# Patient Record
Sex: MALE | Race: WHITE | NOT HISPANIC OR LATINO | Employment: OTHER
[De-identification: names, ages, dates, MRNs, and addresses within clinical notes are randomized per-mention and may not be internally consistent; named-entity substitution may affect disease eponyms.]

---

## 2017-02-09 ENCOUNTER — RECORDS - HEALTHEAST (OUTPATIENT)
Dept: ADMINISTRATIVE | Facility: OTHER | Age: 67
End: 2017-02-09

## 2017-03-08 ENCOUNTER — RECORDS - HEALTHEAST (OUTPATIENT)
Dept: ADMINISTRATIVE | Facility: OTHER | Age: 67
End: 2017-03-08

## 2017-03-09 ENCOUNTER — RECORDS - HEALTHEAST (OUTPATIENT)
Dept: ADMINISTRATIVE | Facility: OTHER | Age: 67
End: 2017-03-09

## 2017-07-18 ENCOUNTER — RECORDS - HEALTHEAST (OUTPATIENT)
Dept: ADMINISTRATIVE | Facility: OTHER | Age: 67
End: 2017-07-18

## 2017-10-06 ENCOUNTER — RECORDS - HEALTHEAST (OUTPATIENT)
Dept: ADMINISTRATIVE | Facility: OTHER | Age: 67
End: 2017-10-06

## 2017-11-29 ENCOUNTER — RECORDS - HEALTHEAST (OUTPATIENT)
Dept: ADMINISTRATIVE | Facility: OTHER | Age: 67
End: 2017-11-29

## 2018-04-06 ENCOUNTER — RECORDS - HEALTHEAST (OUTPATIENT)
Dept: ADMINISTRATIVE | Facility: OTHER | Age: 68
End: 2018-04-06

## 2018-05-03 ENCOUNTER — RECORDS - HEALTHEAST (OUTPATIENT)
Dept: ADMINISTRATIVE | Facility: OTHER | Age: 68
End: 2018-05-03

## 2018-07-17 ENCOUNTER — RECORDS - HEALTHEAST (OUTPATIENT)
Dept: ADMINISTRATIVE | Facility: OTHER | Age: 68
End: 2018-07-17

## 2018-12-04 ENCOUNTER — COMMUNICATION - HEALTHEAST (OUTPATIENT)
Dept: FAMILY MEDICINE | Facility: CLINIC | Age: 68
End: 2018-12-04

## 2018-12-04 ENCOUNTER — OFFICE VISIT - HEALTHEAST (OUTPATIENT)
Dept: FAMILY MEDICINE | Facility: CLINIC | Age: 68
End: 2018-12-04

## 2018-12-04 DIAGNOSIS — M1A.00X0 IDIOPATHIC CHRONIC GOUT WITHOUT TOPHUS, UNSPECIFIED SITE: ICD-10-CM

## 2018-12-04 DIAGNOSIS — E66.01 MORBID OBESITY (H): ICD-10-CM

## 2018-12-04 DIAGNOSIS — I47.10 SVT (SUPRAVENTRICULAR TACHYCARDIA) (H): ICD-10-CM

## 2018-12-04 DIAGNOSIS — E78.2 ELEVATED TRIGLYCERIDES WITH HIGH CHOLESTEROL: ICD-10-CM

## 2018-12-04 DIAGNOSIS — F33.42 RECURRENT MAJOR DEPRESSIVE DISORDER, IN FULL REMISSION (H): ICD-10-CM

## 2018-12-04 DIAGNOSIS — Z76.89 ENCOUNTER TO ESTABLISH CARE WITH NEW DOCTOR: ICD-10-CM

## 2018-12-04 DIAGNOSIS — I10 BENIGN ESSENTIAL HYPERTENSION: ICD-10-CM

## 2018-12-04 ASSESSMENT — MIFFLIN-ST. JEOR: SCORE: 1923.11

## 2019-03-02 ENCOUNTER — COMMUNICATION - HEALTHEAST (OUTPATIENT)
Dept: FAMILY MEDICINE | Facility: CLINIC | Age: 69
End: 2019-03-02

## 2019-03-07 ENCOUNTER — OFFICE VISIT - HEALTHEAST (OUTPATIENT)
Dept: FAMILY MEDICINE | Facility: CLINIC | Age: 69
End: 2019-03-07

## 2019-03-07 DIAGNOSIS — Z00.00 ROUTINE GENERAL MEDICAL EXAMINATION AT A HEALTH CARE FACILITY: ICD-10-CM

## 2019-03-07 DIAGNOSIS — E78.2 ELEVATED TRIGLYCERIDES WITH HIGH CHOLESTEROL: ICD-10-CM

## 2019-03-07 DIAGNOSIS — E66.01 MORBID OBESITY (H): ICD-10-CM

## 2019-03-07 DIAGNOSIS — Z78.9 ALCOHOL USE: ICD-10-CM

## 2019-03-07 DIAGNOSIS — Z87.39 HX OF GOUT: ICD-10-CM

## 2019-03-07 DIAGNOSIS — I10 BENIGN ESSENTIAL HYPERTENSION: ICD-10-CM

## 2019-03-07 LAB
ANION GAP SERPL CALCULATED.3IONS-SCNC: 12 MMOL/L (ref 5–18)
BUN SERPL-MCNC: 8 MG/DL (ref 8–22)
CALCIUM SERPL-MCNC: 9 MG/DL (ref 8.5–10.5)
CHLORIDE BLD-SCNC: 105 MMOL/L (ref 98–107)
CHOLEST SERPL-MCNC: 152 MG/DL
CO2 SERPL-SCNC: 26 MMOL/L (ref 22–31)
CREAT SERPL-MCNC: 0.81 MG/DL (ref 0.7–1.3)
FASTING STATUS PATIENT QL REPORTED: YES
GFR SERPL CREATININE-BSD FRML MDRD: >60 ML/MIN/1.73M2
GLUCOSE BLD-MCNC: 139 MG/DL (ref 70–125)
HDLC SERPL-MCNC: 55 MG/DL
LDLC SERPL CALC-MCNC: 64 MG/DL
POTASSIUM BLD-SCNC: 4.4 MMOL/L (ref 3.5–5)
SODIUM SERPL-SCNC: 143 MMOL/L (ref 136–145)
TRIGL SERPL-MCNC: 166 MG/DL
URATE SERPL-MCNC: 5.9 MG/DL (ref 3–8)

## 2019-03-07 ASSESSMENT — MIFFLIN-ST. JEOR: SCORE: 1925.66

## 2019-03-08 ENCOUNTER — COMMUNICATION - HEALTHEAST (OUTPATIENT)
Dept: FAMILY MEDICINE | Facility: CLINIC | Age: 69
End: 2019-03-08

## 2019-05-29 ENCOUNTER — COMMUNICATION - HEALTHEAST (OUTPATIENT)
Dept: FAMILY MEDICINE | Facility: CLINIC | Age: 69
End: 2019-05-29

## 2019-05-29 DIAGNOSIS — F33.42 RECURRENT MAJOR DEPRESSIVE DISORDER, IN FULL REMISSION (H): ICD-10-CM

## 2019-09-03 ENCOUNTER — COMMUNICATION - HEALTHEAST (OUTPATIENT)
Dept: FAMILY MEDICINE | Facility: CLINIC | Age: 69
End: 2019-09-03

## 2019-09-03 DIAGNOSIS — F33.42 RECURRENT MAJOR DEPRESSIVE DISORDER, IN FULL REMISSION (H): ICD-10-CM

## 2019-09-10 ENCOUNTER — OFFICE VISIT - HEALTHEAST (OUTPATIENT)
Dept: FAMILY MEDICINE | Facility: CLINIC | Age: 69
End: 2019-09-10

## 2019-09-10 ENCOUNTER — RECORDS - HEALTHEAST (OUTPATIENT)
Dept: GENERAL RADIOLOGY | Facility: CLINIC | Age: 69
End: 2019-09-10

## 2019-09-10 DIAGNOSIS — Z23 NEEDS FLU SHOT: ICD-10-CM

## 2019-09-10 DIAGNOSIS — M25.561 PAIN IN RIGHT KNEE: ICD-10-CM

## 2019-09-10 DIAGNOSIS — M17.0 PRIMARY OSTEOARTHRITIS OF BOTH KNEES: ICD-10-CM

## 2019-09-10 DIAGNOSIS — M17.0 BILATERAL PRIMARY OSTEOARTHRITIS OF KNEE: ICD-10-CM

## 2019-09-10 DIAGNOSIS — M25.562 CHRONIC PAIN OF BOTH KNEES: ICD-10-CM

## 2019-09-10 DIAGNOSIS — R74.01 ELEVATED TRANSAMINASE LEVEL: ICD-10-CM

## 2019-09-10 DIAGNOSIS — M25.562 PAIN IN LEFT KNEE: ICD-10-CM

## 2019-09-10 DIAGNOSIS — G89.29 CHRONIC PAIN OF BOTH KNEES: ICD-10-CM

## 2019-09-10 DIAGNOSIS — M25.561 CHRONIC PAIN OF BOTH KNEES: ICD-10-CM

## 2019-09-10 DIAGNOSIS — G89.29 OTHER CHRONIC PAIN: ICD-10-CM

## 2019-09-10 DIAGNOSIS — L82.1 SEBORRHEIC KERATOSIS: ICD-10-CM

## 2019-09-10 DIAGNOSIS — R93.2 ABNORMAL ULTRASOUND OF LIVER: ICD-10-CM

## 2019-09-10 LAB
ALBUMIN SERPL-MCNC: 3.9 G/DL (ref 3.5–5)
ALP SERPL-CCNC: 106 U/L (ref 45–120)
ALT SERPL W P-5'-P-CCNC: 90 U/L (ref 0–45)
AST SERPL W P-5'-P-CCNC: 82 U/L (ref 0–40)
BILIRUB DIRECT SERPL-MCNC: 0.2 MG/DL
BILIRUB SERPL-MCNC: 0.3 MG/DL (ref 0–1)
PROT SERPL-MCNC: 7 G/DL (ref 6–8)

## 2019-09-10 ASSESSMENT — ANXIETY QUESTIONNAIRES
5. BEING SO RESTLESS THAT IT IS HARD TO SIT STILL: NOT AT ALL
2. NOT BEING ABLE TO STOP OR CONTROL WORRYING: NOT AT ALL
6. BECOMING EASILY ANNOYED OR IRRITABLE: NOT AT ALL
GAD7 TOTAL SCORE: 0
IF YOU CHECKED OFF ANY PROBLEMS ON THIS QUESTIONNAIRE, HOW DIFFICULT HAVE THESE PROBLEMS MADE IT FOR YOU TO DO YOUR WORK, TAKE CARE OF THINGS AT HOME, OR GET ALONG WITH OTHER PEOPLE: NOT DIFFICULT AT ALL
7. FEELING AFRAID AS IF SOMETHING AWFUL MIGHT HAPPEN: NOT AT ALL
3. WORRYING TOO MUCH ABOUT DIFFERENT THINGS: NOT AT ALL
4. TROUBLE RELAXING: NOT AT ALL
1. FEELING NERVOUS, ANXIOUS, OR ON EDGE: NOT AT ALL

## 2019-09-10 ASSESSMENT — PATIENT HEALTH QUESTIONNAIRE - PHQ9: SUM OF ALL RESPONSES TO PHQ QUESTIONS 1-9: 1

## 2019-09-11 ENCOUNTER — COMMUNICATION - HEALTHEAST (OUTPATIENT)
Dept: FAMILY MEDICINE | Facility: CLINIC | Age: 69
End: 2019-09-11

## 2019-09-17 ENCOUNTER — HOSPITAL ENCOUNTER (OUTPATIENT)
Dept: ULTRASOUND IMAGING | Facility: CLINIC | Age: 69
Discharge: HOME | End: 2019-09-17
Attending: FAMILY MEDICINE

## 2019-09-17 ENCOUNTER — COMMUNICATION - HEALTHEAST (OUTPATIENT)
Dept: FAMILY MEDICINE | Facility: CLINIC | Age: 69
End: 2019-09-17

## 2019-09-17 DIAGNOSIS — R93.2 ABNORMAL ULTRASOUND OF LIVER: ICD-10-CM

## 2019-09-17 DIAGNOSIS — R74.01 ELEVATED TRANSAMINASE LEVEL: ICD-10-CM

## 2019-09-17 DIAGNOSIS — R74.02 ELEVATION OF LEVEL OF TRANSAMINASE AND LACTIC ACID DEHYDROGENASE (LDH): ICD-10-CM

## 2019-09-17 DIAGNOSIS — R74.01 ELEVATION OF LEVEL OF TRANSAMINASE AND LACTIC ACID DEHYDROGENASE (LDH): ICD-10-CM

## 2019-11-27 ENCOUNTER — COMMUNICATION - HEALTHEAST (OUTPATIENT)
Dept: FAMILY MEDICINE | Facility: CLINIC | Age: 69
End: 2019-11-27

## 2019-11-27 DIAGNOSIS — E78.2 ELEVATED TRIGLYCERIDES WITH HIGH CHOLESTEROL: ICD-10-CM

## 2019-11-27 DIAGNOSIS — I10 BENIGN ESSENTIAL HYPERTENSION: ICD-10-CM

## 2019-11-27 DIAGNOSIS — I47.10 SVT (SUPRAVENTRICULAR TACHYCARDIA) (H): ICD-10-CM

## 2019-11-27 DIAGNOSIS — M1A.00X0 IDIOPATHIC CHRONIC GOUT WITHOUT TOPHUS, UNSPECIFIED SITE: ICD-10-CM

## 2019-11-27 DIAGNOSIS — F33.42 RECURRENT MAJOR DEPRESSIVE DISORDER, IN FULL REMISSION (H): ICD-10-CM

## 2019-12-01 ENCOUNTER — COMMUNICATION - HEALTHEAST (OUTPATIENT)
Dept: FAMILY MEDICINE | Facility: CLINIC | Age: 69
End: 2019-12-01

## 2019-12-01 DIAGNOSIS — F33.42 RECURRENT MAJOR DEPRESSIVE DISORDER, IN FULL REMISSION (H): ICD-10-CM

## 2020-02-24 ENCOUNTER — COMMUNICATION - HEALTHEAST (OUTPATIENT)
Dept: FAMILY MEDICINE | Facility: CLINIC | Age: 70
End: 2020-02-24

## 2020-02-24 DIAGNOSIS — F33.42 RECURRENT MAJOR DEPRESSIVE DISORDER, IN FULL REMISSION (H): ICD-10-CM

## 2020-02-25 ENCOUNTER — COMMUNICATION - HEALTHEAST (OUTPATIENT)
Dept: FAMILY MEDICINE | Facility: CLINIC | Age: 70
End: 2020-02-25

## 2020-03-11 ENCOUNTER — COMMUNICATION - HEALTHEAST (OUTPATIENT)
Dept: SCHEDULING | Facility: CLINIC | Age: 70
End: 2020-03-11

## 2020-03-12 ENCOUNTER — OFFICE VISIT - HEALTHEAST (OUTPATIENT)
Dept: FAMILY MEDICINE | Facility: CLINIC | Age: 70
End: 2020-03-12

## 2020-03-12 DIAGNOSIS — J43.8 OTHER EMPHYSEMA (H): ICD-10-CM

## 2020-03-12 DIAGNOSIS — E66.01 MORBID OBESITY (H): ICD-10-CM

## 2020-03-12 DIAGNOSIS — E78.2 ELEVATED TRIGLYCERIDES WITH HIGH CHOLESTEROL: ICD-10-CM

## 2020-03-12 DIAGNOSIS — R74.01 ELEVATED TRANSAMINASE LEVEL: ICD-10-CM

## 2020-03-12 DIAGNOSIS — Z12.5 SCREENING FOR PROSTATE CANCER: ICD-10-CM

## 2020-03-12 DIAGNOSIS — Z23 ENCOUNTER FOR ADMINISTRATION OF VACCINE: ICD-10-CM

## 2020-03-12 DIAGNOSIS — K76.0 HEPATIC STEATOSIS: ICD-10-CM

## 2020-03-12 DIAGNOSIS — F33.42 RECURRENT MAJOR DEPRESSIVE DISORDER, IN FULL REMISSION (H): ICD-10-CM

## 2020-03-12 DIAGNOSIS — I10 BENIGN ESSENTIAL HYPERTENSION: ICD-10-CM

## 2020-03-12 DIAGNOSIS — J06.9 URI WITH COUGH AND CONGESTION: ICD-10-CM

## 2020-03-12 DIAGNOSIS — R01.1 HEART MURMUR: ICD-10-CM

## 2020-03-12 DIAGNOSIS — R73.03 PREDIABETES: ICD-10-CM

## 2020-03-12 DIAGNOSIS — Z00.00 ROUTINE GENERAL MEDICAL EXAMINATION AT A HEALTH CARE FACILITY: ICD-10-CM

## 2020-03-12 DIAGNOSIS — I47.10 SVT (SUPRAVENTRICULAR TACHYCARDIA) (H): ICD-10-CM

## 2020-03-12 LAB
ALBUMIN SERPL-MCNC: 3.8 G/DL (ref 3.5–5)
ALP SERPL-CCNC: 130 U/L (ref 45–120)
ALT SERPL W P-5'-P-CCNC: 70 U/L (ref 0–45)
ANION GAP SERPL CALCULATED.3IONS-SCNC: 10 MMOL/L (ref 5–18)
AST SERPL W P-5'-P-CCNC: 77 U/L (ref 0–40)
BILIRUB SERPL-MCNC: 0.4 MG/DL (ref 0–1)
BUN SERPL-MCNC: 12 MG/DL (ref 8–22)
CALCIUM SERPL-MCNC: 9 MG/DL (ref 8.5–10.5)
CHLORIDE BLD-SCNC: 104 MMOL/L (ref 98–107)
CHOLEST SERPL-MCNC: 156 MG/DL
CO2 SERPL-SCNC: 25 MMOL/L (ref 22–31)
CREAT SERPL-MCNC: 0.76 MG/DL (ref 0.7–1.3)
FASTING STATUS PATIENT QL REPORTED: YES
GFR SERPL CREATININE-BSD FRML MDRD: >60 ML/MIN/1.73M2
GLUCOSE BLD-MCNC: 151 MG/DL (ref 70–125)
HBA1C MFR BLD: 6.5 %
HDLC SERPL-MCNC: 57 MG/DL
LDLC SERPL CALC-MCNC: 77 MG/DL
POTASSIUM BLD-SCNC: 4.4 MMOL/L (ref 3.5–5)
PROT SERPL-MCNC: 7.1 G/DL (ref 6–8)
PSA SERPL-MCNC: 0.6 NG/ML (ref 0–4.5)
SODIUM SERPL-SCNC: 139 MMOL/L (ref 136–145)
TRIGL SERPL-MCNC: 111 MG/DL

## 2020-03-12 ASSESSMENT — ANXIETY QUESTIONNAIRES
7. FEELING AFRAID AS IF SOMETHING AWFUL MIGHT HAPPEN: NOT AT ALL
4. TROUBLE RELAXING: NOT AT ALL
2. NOT BEING ABLE TO STOP OR CONTROL WORRYING: NOT AT ALL
6. BECOMING EASILY ANNOYED OR IRRITABLE: NOT AT ALL
1. FEELING NERVOUS, ANXIOUS, OR ON EDGE: NOT AT ALL
GAD7 TOTAL SCORE: 0
5. BEING SO RESTLESS THAT IT IS HARD TO SIT STILL: NOT AT ALL
3. WORRYING TOO MUCH ABOUT DIFFERENT THINGS: NOT AT ALL

## 2020-03-12 ASSESSMENT — MIFFLIN-ST. JEOR: SCORE: 1959.96

## 2020-03-12 ASSESSMENT — PATIENT HEALTH QUESTIONNAIRE - PHQ9: SUM OF ALL RESPONSES TO PHQ QUESTIONS 1-9: 5

## 2020-03-26 ENCOUNTER — COMMUNICATION - HEALTHEAST (OUTPATIENT)
Dept: FAMILY MEDICINE | Facility: CLINIC | Age: 70
End: 2020-03-26

## 2020-06-02 ENCOUNTER — COMMUNICATION - HEALTHEAST (OUTPATIENT)
Dept: FAMILY MEDICINE | Facility: CLINIC | Age: 70
End: 2020-06-02

## 2020-06-02 DIAGNOSIS — F33.42 RECURRENT MAJOR DEPRESSIVE DISORDER, IN FULL REMISSION (H): ICD-10-CM

## 2020-07-07 ENCOUNTER — OFFICE VISIT - HEALTHEAST (OUTPATIENT)
Dept: FAMILY MEDICINE | Facility: CLINIC | Age: 70
End: 2020-07-07

## 2020-07-07 DIAGNOSIS — R01.1 HEART MURMUR: ICD-10-CM

## 2020-07-07 DIAGNOSIS — R74.01 ELEVATED TRANSAMINASE LEVEL: ICD-10-CM

## 2020-07-07 DIAGNOSIS — K76.0 HEPATIC STEATOSIS: ICD-10-CM

## 2020-07-07 DIAGNOSIS — E11.9 TYPE 2 DIABETES MELLITUS WITHOUT COMPLICATION, WITHOUT LONG-TERM CURRENT USE OF INSULIN (H): ICD-10-CM

## 2020-07-07 LAB
ALBUMIN SERPL-MCNC: 3.6 G/DL (ref 3.5–5)
ALP SERPL-CCNC: 140 U/L (ref 45–120)
ALT SERPL W P-5'-P-CCNC: 79 U/L (ref 0–45)
AST SERPL W P-5'-P-CCNC: 96 U/L (ref 0–40)
BILIRUB DIRECT SERPL-MCNC: 0.2 MG/DL
BILIRUB SERPL-MCNC: 0.4 MG/DL (ref 0–1)
HBA1C MFR BLD: 6.3 % (ref 3.5–6)
PROT SERPL-MCNC: 7.3 G/DL (ref 6–8)

## 2020-07-07 ASSESSMENT — ANXIETY QUESTIONNAIRES
3. WORRYING TOO MUCH ABOUT DIFFERENT THINGS: NOT AT ALL
1. FEELING NERVOUS, ANXIOUS, OR ON EDGE: NOT AT ALL
GAD7 TOTAL SCORE: 1
4. TROUBLE RELAXING: NOT AT ALL
2. NOT BEING ABLE TO STOP OR CONTROL WORRYING: NOT AT ALL
IF YOU CHECKED OFF ANY PROBLEMS ON THIS QUESTIONNAIRE, HOW DIFFICULT HAVE THESE PROBLEMS MADE IT FOR YOU TO DO YOUR WORK, TAKE CARE OF THINGS AT HOME, OR GET ALONG WITH OTHER PEOPLE: NOT DIFFICULT AT ALL
6. BECOMING EASILY ANNOYED OR IRRITABLE: SEVERAL DAYS
5. BEING SO RESTLESS THAT IT IS HARD TO SIT STILL: NOT AT ALL
7. FEELING AFRAID AS IF SOMETHING AWFUL MIGHT HAPPEN: NOT AT ALL

## 2020-07-07 ASSESSMENT — PATIENT HEALTH QUESTIONNAIRE - PHQ9: SUM OF ALL RESPONSES TO PHQ QUESTIONS 1-9: 4

## 2020-08-31 ENCOUNTER — RECORDS - HEALTHEAST (OUTPATIENT)
Dept: ADMINISTRATIVE | Facility: OTHER | Age: 70
End: 2020-08-31

## 2020-12-14 ENCOUNTER — COMMUNICATION - HEALTHEAST (OUTPATIENT)
Dept: FAMILY MEDICINE | Facility: CLINIC | Age: 70
End: 2020-12-14

## 2020-12-14 DIAGNOSIS — M1A.00X0 IDIOPATHIC CHRONIC GOUT WITHOUT TOPHUS, UNSPECIFIED SITE: ICD-10-CM

## 2020-12-14 DIAGNOSIS — F33.42 RECURRENT MAJOR DEPRESSIVE DISORDER, IN FULL REMISSION (H): ICD-10-CM

## 2020-12-14 DIAGNOSIS — I47.10 SVT (SUPRAVENTRICULAR TACHYCARDIA) (H): ICD-10-CM

## 2020-12-14 DIAGNOSIS — E78.2 ELEVATED TRIGLYCERIDES WITH HIGH CHOLESTEROL: ICD-10-CM

## 2020-12-14 DIAGNOSIS — I10 BENIGN ESSENTIAL HYPERTENSION: ICD-10-CM

## 2020-12-22 ENCOUNTER — OFFICE VISIT - HEALTHEAST (OUTPATIENT)
Dept: FAMILY MEDICINE | Facility: CLINIC | Age: 70
End: 2020-12-22

## 2020-12-22 DIAGNOSIS — R74.01 ELEVATED TRANSAMINASE LEVEL: ICD-10-CM

## 2020-12-22 DIAGNOSIS — F33.42 RECURRENT MAJOR DEPRESSIVE DISORDER, IN FULL REMISSION (H): ICD-10-CM

## 2020-12-22 DIAGNOSIS — I10 BENIGN ESSENTIAL HYPERTENSION: ICD-10-CM

## 2020-12-22 DIAGNOSIS — L98.9 SKIN LESION: ICD-10-CM

## 2020-12-22 DIAGNOSIS — E11.9 TYPE 2 DIABETES MELLITUS WITHOUT COMPLICATION, WITHOUT LONG-TERM CURRENT USE OF INSULIN (H): ICD-10-CM

## 2020-12-22 DIAGNOSIS — K76.0 HEPATIC STEATOSIS: ICD-10-CM

## 2020-12-22 DIAGNOSIS — Z87.39 HX OF GOUT: ICD-10-CM

## 2020-12-22 LAB
ALBUMIN SERPL-MCNC: 3.9 G/DL (ref 3.5–5)
ALP SERPL-CCNC: 136 U/L (ref 45–120)
ALT SERPL W P-5'-P-CCNC: 68 U/L (ref 0–45)
ANION GAP SERPL CALCULATED.3IONS-SCNC: 10 MMOL/L (ref 5–18)
AST SERPL W P-5'-P-CCNC: 76 U/L (ref 0–40)
BILIRUB SERPL-MCNC: 0.4 MG/DL (ref 0–1)
BUN SERPL-MCNC: 12 MG/DL (ref 8–22)
CALCIUM SERPL-MCNC: 8.9 MG/DL (ref 8.5–10.5)
CHLORIDE BLD-SCNC: 105 MMOL/L (ref 98–107)
CO2 SERPL-SCNC: 27 MMOL/L (ref 22–31)
CREAT SERPL-MCNC: 0.74 MG/DL (ref 0.7–1.3)
GFR SERPL CREATININE-BSD FRML MDRD: >60 ML/MIN/1.73M2
GLUCOSE BLD-MCNC: 155 MG/DL (ref 70–125)
HBA1C MFR BLD: 6.1 %
POTASSIUM BLD-SCNC: 4.3 MMOL/L (ref 3.5–5)
PROT SERPL-MCNC: 7 G/DL (ref 6–8)
SODIUM SERPL-SCNC: 142 MMOL/L (ref 136–145)
URATE SERPL-MCNC: 6.3 MG/DL (ref 3–8)

## 2021-01-22 ENCOUNTER — RECORDS - HEALTHEAST (OUTPATIENT)
Dept: ADMINISTRATIVE | Facility: OTHER | Age: 71
End: 2021-01-22

## 2021-02-02 ENCOUNTER — RECORDS - HEALTHEAST (OUTPATIENT)
Dept: ADMINISTRATIVE | Facility: OTHER | Age: 71
End: 2021-02-02

## 2021-02-03 ENCOUNTER — COMMUNICATION - HEALTHEAST (OUTPATIENT)
Dept: FAMILY MEDICINE | Facility: CLINIC | Age: 71
End: 2021-02-03

## 2021-02-03 DIAGNOSIS — E78.2 ELEVATED TRIGLYCERIDES WITH HIGH CHOLESTEROL: ICD-10-CM

## 2021-02-09 ENCOUNTER — COMMUNICATION - HEALTHEAST (OUTPATIENT)
Dept: FAMILY MEDICINE | Facility: CLINIC | Age: 71
End: 2021-02-09

## 2021-02-09 DIAGNOSIS — E78.2 ELEVATED TRIGLYCERIDES WITH HIGH CHOLESTEROL: ICD-10-CM

## 2021-02-16 ENCOUNTER — COMMUNICATION - HEALTHEAST (OUTPATIENT)
Dept: FAMILY MEDICINE | Facility: CLINIC | Age: 71
End: 2021-02-16

## 2021-02-16 DIAGNOSIS — E78.2 ELEVATED TRIGLYCERIDES WITH HIGH CHOLESTEROL: ICD-10-CM

## 2021-02-16 RX ORDER — FENOFIBRATE 48 MG/1
48 TABLET, COATED ORAL DAILY
Qty: 90 TABLET | Refills: 3 | Status: SHIPPED | OUTPATIENT
Start: 2021-02-16 | End: 2022-02-11

## 2021-03-08 ENCOUNTER — COMMUNICATION - HEALTHEAST (OUTPATIENT)
Dept: FAMILY MEDICINE | Facility: CLINIC | Age: 71
End: 2021-03-08

## 2021-03-10 ENCOUNTER — COMMUNICATION - HEALTHEAST (OUTPATIENT)
Dept: FAMILY MEDICINE | Facility: CLINIC | Age: 71
End: 2021-03-10

## 2021-03-10 DIAGNOSIS — F33.42 RECURRENT MAJOR DEPRESSIVE DISORDER, IN FULL REMISSION (H): ICD-10-CM

## 2021-03-15 ENCOUNTER — OFFICE VISIT - HEALTHEAST (OUTPATIENT)
Dept: FAMILY MEDICINE | Facility: CLINIC | Age: 71
End: 2021-03-15

## 2021-03-15 DIAGNOSIS — F33.42 RECURRENT MAJOR DEPRESSIVE DISORDER, IN FULL REMISSION (H): ICD-10-CM

## 2021-03-15 RX ORDER — OLANZAPINE 5 MG/1
5 TABLET ORAL DAILY
Qty: 90 TABLET | Refills: 3 | Status: SHIPPED | OUTPATIENT
Start: 2021-03-15 | End: 2022-03-07

## 2021-05-19 ENCOUNTER — OFFICE VISIT - HEALTHEAST (OUTPATIENT)
Dept: FAMILY MEDICINE | Facility: CLINIC | Age: 71
End: 2021-05-19

## 2021-05-19 DIAGNOSIS — F33.42 RECURRENT MAJOR DEPRESSIVE DISORDER, IN FULL REMISSION (H): ICD-10-CM

## 2021-05-19 DIAGNOSIS — E78.2 ELEVATED TRIGLYCERIDES WITH HIGH CHOLESTEROL: ICD-10-CM

## 2021-05-19 DIAGNOSIS — E11.9 TYPE 2 DIABETES MELLITUS WITHOUT COMPLICATION, WITHOUT LONG-TERM CURRENT USE OF INSULIN (H): ICD-10-CM

## 2021-05-19 DIAGNOSIS — I10 BENIGN ESSENTIAL HYPERTENSION: ICD-10-CM

## 2021-05-19 LAB
CHOLEST SERPL-MCNC: 178 MG/DL
CREAT UR-MCNC: 172.5 MG/DL
FASTING STATUS PATIENT QL REPORTED: YES
HBA1C MFR BLD: 6.1 %
HDLC SERPL-MCNC: 57 MG/DL
LDLC SERPL CALC-MCNC: 82 MG/DL
MICROALBUMIN UR-MCNC: 0.81 MG/DL (ref 0–1.99)
MICROALBUMIN/CREAT UR: 4.7 MG/G
TRIGL SERPL-MCNC: 194 MG/DL

## 2021-05-19 RX ORDER — LOSARTAN POTASSIUM 50 MG/1
50 TABLET ORAL DAILY
Qty: 30 TABLET | Refills: 3 | Status: SHIPPED | OUTPATIENT
Start: 2021-05-19 | End: 2021-09-30

## 2021-05-19 ASSESSMENT — ANXIETY QUESTIONNAIRES
7. FEELING AFRAID AS IF SOMETHING AWFUL MIGHT HAPPEN: NOT AT ALL
6. BECOMING EASILY ANNOYED OR IRRITABLE: SEVERAL DAYS
5. BEING SO RESTLESS THAT IT IS HARD TO SIT STILL: NOT AT ALL
1. FEELING NERVOUS, ANXIOUS, OR ON EDGE: SEVERAL DAYS
2. NOT BEING ABLE TO STOP OR CONTROL WORRYING: NOT AT ALL
4. TROUBLE RELAXING: NOT AT ALL
GAD7 TOTAL SCORE: 2
IF YOU CHECKED OFF ANY PROBLEMS ON THIS QUESTIONNAIRE, HOW DIFFICULT HAVE THESE PROBLEMS MADE IT FOR YOU TO DO YOUR WORK, TAKE CARE OF THINGS AT HOME, OR GET ALONG WITH OTHER PEOPLE: SOMEWHAT DIFFICULT
3. WORRYING TOO MUCH ABOUT DIFFERENT THINGS: NOT AT ALL

## 2021-05-19 ASSESSMENT — PATIENT HEALTH QUESTIONNAIRE - PHQ9: SUM OF ALL RESPONSES TO PHQ QUESTIONS 1-9: 4

## 2021-05-26 ASSESSMENT — PATIENT HEALTH QUESTIONNAIRE - PHQ9
SUM OF ALL RESPONSES TO PHQ QUESTIONS 1-9: 5
SUM OF ALL RESPONSES TO PHQ QUESTIONS 1-9: 1

## 2021-05-27 VITALS — SYSTOLIC BLOOD PRESSURE: 130 MMHG | DIASTOLIC BLOOD PRESSURE: 70 MMHG | HEART RATE: 88 BPM | WEIGHT: 258 LBS

## 2021-05-27 ASSESSMENT — PATIENT HEALTH QUESTIONNAIRE - PHQ9
SUM OF ALL RESPONSES TO PHQ QUESTIONS 1-9: 4
SUM OF ALL RESPONSES TO PHQ QUESTIONS 1-9: 4

## 2021-05-28 ASSESSMENT — ANXIETY QUESTIONNAIRES
GAD7 TOTAL SCORE: 2
GAD7 TOTAL SCORE: 0
GAD7 TOTAL SCORE: 0
GAD7 TOTAL SCORE: 1

## 2021-05-29 NOTE — TELEPHONE ENCOUNTER
RN cannot approve Refill Request    RN can NOT refill this medication med is not covered by policy/route to provider.    Rashi Johnson, [facility] Triage/Med Refill 5/29/2019    Requested Prescriptions   Pending Prescriptions Disp Refills     OLANZapine (ZYPREXA) 5 MG tablet [Pharmacy Med Name: OLANZAPINE 5 MG TABLET] 90 tablet 0     Sig: TAKE 1 TABLET BY MOUTH EVERY DAY       There is no refill protocol information for this order

## 2021-05-31 NOTE — TELEPHONE ENCOUNTER
RN cannot approve Refill Request    RN can NOT refill this medication med is not covered by policy/route to provider. Last office visit: 12/4/2018 Shira Contreras MD Last Physical: 3/7/2019 Last MTM visit: Visit date not found Last visit same specialty: 12/4/2018 Shira Contreras MD.  Next visit within 3 mo: Visit date not found  Next physical within 3 mo: Visit date not found      Britney Hatch, [facility] Triage/Med Refill 9/3/2019    Requested Prescriptions   Pending Prescriptions Disp Refills     OLANZapine (ZYPREXA) 5 MG tablet [Pharmacy Med Name: OLANZAPINE 5 MG TABLET] 90 tablet 0     Sig: TAKE 1 TABLET BY MOUTH EVERY DAY       There is no refill protocol information for this order

## 2021-06-01 NOTE — PROGRESS NOTES
Assessment/Plan:    1. Abnormal ultrasound of liver  2. Elevated transaminase level  Per review of records, pt with hx abnormal liver appearance on US as well as worsening transaminase levels over time - recommend re-evaluating at this time with LFTs and US. Certainly pt is obese so could be fatty liver. Will consider referral to GI pending results.  - US Abdomen Limited; Future  - Hepatic Profile    3. Chronic pain of both knees  4. Primary osteoarthritis of both knees  Pt reporting worsening bilateral knee pain and issues with function. Per review of records, pt had XR done last year that showed mild OA - suspect progression of OA, will check with XR today. Provided printed exercises to be done and home and information printed on OA. Discussed use of ice/heat, tylenol/ibuprofen and could consider steroid injection in the future.  - XR Knees Bilateral 1 Or 2 VWS; Future    5. Seborrheic keratosis  Back lesion c/w seb keratosis recommend routine monitoring and certainly can remove in future if desired or lesion is changing    6. Needs flu shot  Due for flu shot, administered today   - Influenza High Dose, Seasonal 65+ yrs      Follow up: pending test results    Shira Contreras MD  [facility]    Subjective:    Patient ID: Christopher Lindsey is a 68 y.o. male is here today for multiple concerns    Hx abnormal US and liver function tests  -pt reports hx abnormal liver and gallbladder on US as well as elevated liver tests - states he was supposed to have follow up on this  -per review of past records - pt had abd US on 5/3/18 which showed hepatocellular disease vs fatty infiltration of liver (states similar to US in 2011) as well as persistent 3 small masses/lesions in gallbladder which are likely polyps  -prior labs show: AST 60 and ALT 74 on 4/6/18; ALT 53 and AST 38 on 10/6/17  -pt denies RUQ/abd pain, no nausea/vomiting or issues with his stools    Mole on back  -upper back  -last was 4mm, had been  becoming more asymmetric over time  -no pain or bleeding  -hasn't noticed specific changes  -prior doctor had recommended monitoring with pictures but pt states he hasn't done this  -wondering if it needs to be removed    Knee pain  -issue for past 1.5 years  -initially thought was patellofemoral syndrome  -pain with up/down stairs and if walks too long or too far  -pain in front of knee cap  -takes 1 alleve/day for this  -per review of prior records pt had bilateral knee XR done on 5/3/18 which showed medial joint space narrowing of both knees as well as mild spurring      Patient Active Problem List   Diagnosis     Elevated triglycerides with high cholesterol     Benign essential hypertension     Other emphysema (H)     Prediabetes     Recurrent major depressive disorder, in full remission (H)     SVT (supraventricular tachycardia) (H)     TERESA (obstructive sleep apnea)     Bilateral primary osteoarthritis of knee     Obesity (BMI 35.0-39.9) with comorbidity (H)     Obesity hypoventilation syndrome (H)     Restrictive lung disease     Hearing problem of both ears     Multiple thyroid nodules     Abnormal liver ultrasound     Hx of colonic polyps     Hx of gout     Past Medical History:   Diagnosis Date     Benign essential hypertension 12/4/2018     Elevated triglycerides with high cholesterol 12/4/2018     TERESA (obstructive sleep apnea) 12/4/2018    Has BIPAP machine     Other emphysema (H) 12/4/2018    Hx following with pulmonology, [facility]     Patellofemoral disorder of both knees 12/4/2018     Prediabetes 12/4/2018     Recurrent major depressive disorder, in full remission (H) 12/4/2018     SVT (supraventricular tachycardia) (H) 12/4/2018    Hx 2 episodes in past year, now on diltiazem for this     Past Surgical History:   Procedure Laterality Date     HEMORROIDECTOMY  2004     INCISION AND DRAINAGE PERIRECTAL ABSCESS       Current Outpatient Medications on File Prior to Visit   Medication Sig Dispense Refill      allopurinol (ZYLOPRIM) 100 MG tablet Take 1 tablet (100 mg total) by mouth daily. 90 tablet 3     atorvastatin (LIPITOR) 20 MG tablet Take 1 tablet (20 mg total) by mouth at bedtime. 90 tablet 3     buPROPion (WELLBUTRIN XL) 150 MG 24 hr tablet Take 1 tablet (150 mg total) by mouth daily. 90 tablet 3     diltiazem (TIAZAC) 180 MG 24 hr capsule Take 1 capsule (180 mg total) by mouth daily. 90 capsule 3     fenofibric acid, choline, 45 mg capsule Take 1 capsule (45 mg total) by mouth daily. 90 capsule 3     FLUoxetine (PROZAC) 20 MG capsule Take 1 capsule (20 mg total) by mouth daily. 90 capsule 3     losartan (COZAAR) 25 MG tablet Take 1 tablet (25 mg total) by mouth daily. 90 tablet 3     OLANZapine (ZYPREXA) 5 MG tablet TAKE 1 TABLET BY MOUTH EVERY DAY 90 tablet 0     No current facility-administered medications on file prior to visit.      No Known Allergies  Social History     Socioeconomic History     Marital status:      Spouse name: Not on file     Number of children: Not on file     Years of education: Not on file     Highest education level: Not on file   Occupational History     Not on file   Social Needs     Financial resource strain: Not on file     Food insecurity:     Worry: Not on file     Inability: Not on file     Transportation needs:     Medical: Not on file     Non-medical: Not on file   Tobacco Use     Smoking status: Former Smoker     Packs/day: 1.00     Years: 40.00     Pack years: 40.00     Last attempt to quit:      Years since quittin.6     Smokeless tobacco: Never Used     Tobacco comment: still using e-cigs at lowest level, trying to quit   Substance and Sexual Activity     Alcohol use: Yes     Frequency: 4 or more times a week     Drinks per session: 5 or 6     Binge frequency: Monthly     Drug use: No     Sexual activity: Not on file   Lifestyle     Physical activity:     Days per week: Not on file     Minutes per session: Not on file     Stress: Not on file    Relationships     Social connections:     Talks on phone: Not on file     Gets together: Not on file     Attends [employer] service: Not on file     Active member of club or organization: Not on file     Attends meetings of clubs or organizations: Not on file     Relationship status: Not on file     Intimate partner violence:     Fear of current or ex partner: Not on file     Emotionally abused: Not on file     Physically abused: Not on file     Forced sexual activity: Not on file   Other Topics Concern     Not on file   Social History Narrative     Not on file     Review of systems is as stated in HPI, and the remainder of system review is otherwise negative.    Objective:      /80   Pulse 75   Wt (!) 256 lb 9 oz (116.4 kg)   BMI 37.89 kg/m      General appearance: awake, NAD, obese  HEENT: atraumatic, normocephalic, no scleral icterus or injection, ears and nose grossly normal, moist mucous membranes  Lungs: breathing comfortably on room air  Extremities: no LE edema bilaterally, moving all extremities, strong peripheral pulses bilaterally; no significant tenderness with palpation of bilateral knees though pt reports significant issues   Skin: 6 mm brown stuck-on lesion on upper back  Neuro: alert, oriented x3, CNs grossly intact, no focal deficits appreciated  Psych: normal mood/affect/behavior, answering questions appropriately, linear thought process

## 2021-06-01 NOTE — TELEPHONE ENCOUNTER
----- Message from Yary Pedraza CMA sent at 9/10/2019  2:03 PM CDT -----      ----- Message -----  From: Shira Contreras MD  Sent: 9/10/2019   1:54 PM  To: Shira Contreras Care Team Pool    Please call pt with xray result.    Adrian White,    Your xray result has returned and shows worsening of osteoarthritis in both knees. Previously it appeared to be mild arthritis but now is moderate to severe on the xray. It would be reasonable to try the exercises, ice/heat, tylenol/ibuprofen/alleve as needed to manage symptoms but we could also certainly discuss and perform steroid knee injections to see if this provides additional relief. If/when you are interested in that feel free to make an appointment for this.    I will let you know when your ultrasound and blood test results return.    Thanks,  Dr Contreras

## 2021-06-01 NOTE — TELEPHONE ENCOUNTER
Reason contacted:  Test results within this encounter   Information relayed:  Dr Barnett note below. Pt will wait for the Ultrasound results to make the knee injection appt   Additional questions:  No  Further follow-up needed:  No  Okay to leave a detailed message:  No

## 2021-06-02 VITALS — HEIGHT: 69 IN | BODY MASS INDEX: 38.13 KG/M2 | WEIGHT: 257.44 LBS

## 2021-06-02 VITALS — BODY MASS INDEX: 38.21 KG/M2 | HEIGHT: 69 IN | WEIGHT: 258 LBS

## 2021-06-03 VITALS
WEIGHT: 256.56 LBS | SYSTOLIC BLOOD PRESSURE: 130 MMHG | BODY MASS INDEX: 37.89 KG/M2 | HEART RATE: 75 BPM | DIASTOLIC BLOOD PRESSURE: 80 MMHG

## 2021-06-03 NOTE — TELEPHONE ENCOUNTER
RN cannot approve Refill Request    RN can NOT refill this medication med is not covered by policy/route to provider. Last office visit: 9/10/2019 Shira Contreras MD Last Physical: 3/7/2019 Last MTM visit: Visit date not found Last visit same specialty: 9/10/2019 Shira Contreras MD.  Next visit within 3 mo: Visit date not found  Next physical within 3 mo: Visit date not found      Jeni Springer, [facility] Triage/Med Refill 11/28/2019    Requested Prescriptions   Pending Prescriptions Disp Refills     buPROPion (WELLBUTRIN XL) 150 MG 24 hr tablet [Pharmacy Med Name: BUPROPION HCL  MG TABLET] 90 tablet 3     Sig: TAKE 1 TABLET BY MOUTH EVERY DAY       Tricyclics/Misc Antidepressant/Antianxiety Meds Refill Protocol Passed - 11/27/2019  5:46 AM        Passed - PCP or prescribing provider visit in last year     Last office visit with prescriber/PCP: 9/10/2019 Shira Contreras MD OR same dept: 9/10/2019 Shira Contreras MD OR same specialty: 9/10/2019 Shira Contreras MD  Last physical: 3/7/2019 Last MTM visit: Visit date not found   Next visit within 3 mo: Visit date not found  Next physical within 3 mo: Visit date not found  Prescriber OR PCP: Shira Contreras MD  Last diagnosis associated with med order: 1. Recurrent major depressive disorder, in full remission (H)  - buPROPion (WELLBUTRIN XL) 150 MG 24 hr tablet [Pharmacy Med Name: BUPROPION HCL  MG TABLET]; TAKE 1 TABLET BY MOUTH EVERY DAY  Dispense: 90 tablet; Refill: 3  - FLUoxetine (PROZAC) 20 MG capsule [Pharmacy Med Name: FLUOXETINE HCL 20 MG CAPSULE]; TAKE 1 CAPSULE BY MOUTH EVERY DAY  Dispense: 90 capsule; Refill: 3    2. Elevated triglycerides with high cholesterol  - atorvastatin (LIPITOR) 20 MG tablet [Pharmacy Med Name: ATORVASTATIN 20 MG TABLET]; TAKE 1 TABLET BY MOUTH EVERYDAY AT BEDTIME  Dispense: 90 tablet; Refill: 3  - fenofibric acid, choline, 45 mg capsule [Pharmacy Med Name: FENOFIBRIC ACID DR 45 MG CAP]; Take 1 capsule  (45 mg total) by mouth daily.  Dispense: 90 capsule; Refill: 3    3. Idiopathic chronic gout without tophus, unspecified site  - allopurinol (ZYLOPRIM) 100 MG tablet [Pharmacy Med Name: ALLOPURINOL 100 MG TABLET]; TAKE 1 TABLET BY MOUTH EVERY DAY  Dispense: 90 tablet; Refill: 3    4. Benign essential hypertension  - losartan (COZAAR) 25 MG tablet [Pharmacy Med Name: LOSARTAN POTASSIUM 25 MG TAB]; TAKE 1 TABLET BY MOUTH EVERY DAY  Dispense: 90 tablet; Refill: 3  - diltiazem (CARDIZEM CD) 180 MG 24 hr capsule [Pharmacy Med Name: DILTIAZEM 24H ER(CD) 180 MG CP]; TAKE 1 CAPSULE BY MOUTH EVERY DAY  Dispense: 90 capsule; Refill: 3    5. SVT (supraventricular tachycardia) (H)  - diltiazem (CARDIZEM CD) 180 MG 24 hr capsule [Pharmacy Med Name: DILTIAZEM 24H ER(CD) 180 MG CP]; TAKE 1 CAPSULE BY MOUTH EVERY DAY  Dispense: 90 capsule; Refill: 3    If protocol passes may refill for 12 months if within 3 months of last provider visit (or a total of 15 months).             atorvastatin (LIPITOR) 20 MG tablet [Pharmacy Med Name: ATORVASTATIN 20 MG TABLET] 90 tablet 3     Sig: TAKE 1 TABLET BY MOUTH EVERYDAY AT BEDTIME       Statins Refill Protocol (Hmg CoA Reductase Inhibitors) Passed - 11/27/2019  5:46 AM        Passed - PCP or prescribing provider visit in past 12 months      Last office visit with prescriber/PCP: 9/10/2019 Shira Contreras MD OR same dept: 9/10/2019 Shira Contreras MD OR same specialty: 9/10/2019 Shira Contreras MD  Last physical: 3/7/2019 Last MTM visit: Visit date not found   Next visit within 3 mo: Visit date not found  Next physical within 3 mo: Visit date not found  Prescriber OR PCP: Shira Contreras MD  Last diagnosis associated with med order: 1. Recurrent major depressive disorder, in full remission (H)  - buPROPion (WELLBUTRIN XL) 150 MG 24 hr tablet [Pharmacy Med Name: BUPROPION HCL  MG TABLET]; TAKE 1 TABLET BY MOUTH EVERY DAY  Dispense: 90 tablet; Refill: 3  - FLUoxetine (PROZAC) 20 MG  capsule [Pharmacy Med Name: FLUOXETINE HCL 20 MG CAPSULE]; TAKE 1 CAPSULE BY MOUTH EVERY DAY  Dispense: 90 capsule; Refill: 3    2. Elevated triglycerides with high cholesterol  - atorvastatin (LIPITOR) 20 MG tablet [Pharmacy Med Name: ATORVASTATIN 20 MG TABLET]; TAKE 1 TABLET BY MOUTH EVERYDAY AT BEDTIME  Dispense: 90 tablet; Refill: 3  - fenofibric acid, choline, 45 mg capsule [Pharmacy Med Name: FENOFIBRIC ACID DR 45 MG CAP]; Take 1 capsule (45 mg total) by mouth daily.  Dispense: 90 capsule; Refill: 3    3. Idiopathic chronic gout without tophus, unspecified site  - allopurinol (ZYLOPRIM) 100 MG tablet [Pharmacy Med Name: ALLOPURINOL 100 MG TABLET]; TAKE 1 TABLET BY MOUTH EVERY DAY  Dispense: 90 tablet; Refill: 3    4. Benign essential hypertension  - losartan (COZAAR) 25 MG tablet [Pharmacy Med Name: LOSARTAN POTASSIUM 25 MG TAB]; TAKE 1 TABLET BY MOUTH EVERY DAY  Dispense: 90 tablet; Refill: 3  - diltiazem (CARDIZEM CD) 180 MG 24 hr capsule [Pharmacy Med Name: DILTIAZEM 24H ER(CD) 180 MG CP]; TAKE 1 CAPSULE BY MOUTH EVERY DAY  Dispense: 90 capsule; Refill: 3    5. SVT (supraventricular tachycardia) (H)  - diltiazem (CARDIZEM CD) 180 MG 24 hr capsule [Pharmacy Med Name: DILTIAZEM 24H ER(CD) 180 MG CP]; TAKE 1 CAPSULE BY MOUTH EVERY DAY  Dispense: 90 capsule; Refill: 3    If protocol passes may refill for 12 months if within 3 months of last provider visit (or a total of 15 months).             allopurinol (ZYLOPRIM) 100 MG tablet [Pharmacy Med Name: ALLOPURINOL 100 MG TABLET] 90 tablet 3     Sig: TAKE 1 TABLET BY MOUTH EVERY DAY       Allopurinol/Febuxostat Refill Protocol  Passed - 11/27/2019  5:46 AM        Passed - LFT or AST or ALT in last 12 months     Albumin   Date Value Ref Range Status   09/10/2019 3.9 3.5 - 5.0 g/dL Final     Bilirubin, Total   Date Value Ref Range Status   09/10/2019 0.3 0.0 - 1.0 mg/dL Final     Bilirubin, Direct   Date Value Ref Range Status   09/10/2019 0.2 <=0.5 mg/dL Final      Alkaline Phosphatase   Date Value Ref Range Status   09/10/2019 106 45 - 120 U/L Final     AST   Date Value Ref Range Status   09/10/2019 82 (H) 0 - 40 U/L Final     ALT   Date Value Ref Range Status   09/10/2019 90 (H) 0 - 45 U/L Final     Protein, Total   Date Value Ref Range Status   09/10/2019 7.0 6.0 - 8.0 g/dL Final                Passed - Visit with PCP or prescribing provider visit in past 12 months     Last office visit with prescriber/PCP: 9/10/2019 Shira Contreras MD OR same dept: 9/10/2019 Shira Contreras MD OR same specialty: 9/10/2019 Shira Contreras MD  Last physical: 3/7/2019 Last MTM visit: Visit date not found   Next visit within 3 mo: Visit date not found  Next physical within 3 mo: Visit date not found  Prescriber OR PCP: Shira Contreras MD  Last diagnosis associated with med order: 1. Recurrent major depressive disorder, in full remission (H)  - buPROPion (WELLBUTRIN XL) 150 MG 24 hr tablet [Pharmacy Med Name: BUPROPION HCL  MG TABLET]; TAKE 1 TABLET BY MOUTH EVERY DAY  Dispense: 90 tablet; Refill: 3  - FLUoxetine (PROZAC) 20 MG capsule [Pharmacy Med Name: FLUOXETINE HCL 20 MG CAPSULE]; TAKE 1 CAPSULE BY MOUTH EVERY DAY  Dispense: 90 capsule; Refill: 3    2. Elevated triglycerides with high cholesterol  - atorvastatin (LIPITOR) 20 MG tablet [Pharmacy Med Name: ATORVASTATIN 20 MG TABLET]; TAKE 1 TABLET BY MOUTH EVERYDAY AT BEDTIME  Dispense: 90 tablet; Refill: 3  - fenofibric acid, choline, 45 mg capsule [Pharmacy Med Name: FENOFIBRIC ACID DR 45 MG CAP]; Take 1 capsule (45 mg total) by mouth daily.  Dispense: 90 capsule; Refill: 3    3. Idiopathic chronic gout without tophus, unspecified site  - allopurinol (ZYLOPRIM) 100 MG tablet [Pharmacy Med Name: ALLOPURINOL 100 MG TABLET]; TAKE 1 TABLET BY MOUTH EVERY DAY  Dispense: 90 tablet; Refill: 3    4. Benign essential hypertension  - losartan (COZAAR) 25 MG tablet [Pharmacy Med Name: LOSARTAN POTASSIUM 25 MG TAB]; TAKE 1 TABLET BY  MOUTH EVERY DAY  Dispense: 90 tablet; Refill: 3  - diltiazem (CARDIZEM CD) 180 MG 24 hr capsule [Pharmacy Med Name: DILTIAZEM 24H ER(CD) 180 MG CP]; TAKE 1 CAPSULE BY MOUTH EVERY DAY  Dispense: 90 capsule; Refill: 3    5. SVT (supraventricular tachycardia) (H)  - diltiazem (CARDIZEM CD) 180 MG 24 hr capsule [Pharmacy Med Name: DILTIAZEM 24H ER(CD) 180 MG CP]; TAKE 1 CAPSULE BY MOUTH EVERY DAY  Dispense: 90 capsule; Refill: 3    If protocol passes may refill for 12 months if within 3 months of last provider visit (or a total of 15 months).             losartan (COZAAR) 25 MG tablet [Pharmacy Med Name: LOSARTAN POTASSIUM 25 MG TAB] 90 tablet 3     Sig: TAKE 1 TABLET BY MOUTH EVERY DAY       Angiotensin Receptor Blocker Protocol Passed - 11/27/2019  5:46 AM        Passed - PCP or prescribing provider visit in past 12 months       Last office visit with prescriber/PCP: 9/10/2019 Shira Contreras MD OR same dept: 9/10/2019 Shira Contreras MD OR same specialty: 9/10/2019 Shira Contreras MD  Last physical: 3/7/2019 Last MTM visit: Visit date not found   Next visit within 3 mo: Visit date not found  Next physical within 3 mo: Visit date not found  Prescriber OR PCP: Shira Contreras MD  Last diagnosis associated with med order: 1. Recurrent major depressive disorder, in full remission (H)  - buPROPion (WELLBUTRIN XL) 150 MG 24 hr tablet [Pharmacy Med Name: BUPROPION HCL  MG TABLET]; TAKE 1 TABLET BY MOUTH EVERY DAY  Dispense: 90 tablet; Refill: 3  - FLUoxetine (PROZAC) 20 MG capsule [Pharmacy Med Name: FLUOXETINE HCL 20 MG CAPSULE]; TAKE 1 CAPSULE BY MOUTH EVERY DAY  Dispense: 90 capsule; Refill: 3    2. Elevated triglycerides with high cholesterol  - atorvastatin (LIPITOR) 20 MG tablet [Pharmacy Med Name: ATORVASTATIN 20 MG TABLET]; TAKE 1 TABLET BY MOUTH EVERYDAY AT BEDTIME  Dispense: 90 tablet; Refill: 3  - fenofibric acid, choline, 45 mg capsule [Pharmacy Med Name: FENOFIBRIC ACID DR 45 MG CAP]; Take 1  capsule (45 mg total) by mouth daily.  Dispense: 90 capsule; Refill: 3    3. Idiopathic chronic gout without tophus, unspecified site  - allopurinol (ZYLOPRIM) 100 MG tablet [Pharmacy Med Name: ALLOPURINOL 100 MG TABLET]; TAKE 1 TABLET BY MOUTH EVERY DAY  Dispense: 90 tablet; Refill: 3    4. Benign essential hypertension  - losartan (COZAAR) 25 MG tablet [Pharmacy Med Name: LOSARTAN POTASSIUM 25 MG TAB]; TAKE 1 TABLET BY MOUTH EVERY DAY  Dispense: 90 tablet; Refill: 3  - diltiazem (CARDIZEM CD) 180 MG 24 hr capsule [Pharmacy Med Name: DILTIAZEM 24H ER(CD) 180 MG CP]; TAKE 1 CAPSULE BY MOUTH EVERY DAY  Dispense: 90 capsule; Refill: 3    5. SVT (supraventricular tachycardia) (H)  - diltiazem (CARDIZEM CD) 180 MG 24 hr capsule [Pharmacy Med Name: DILTIAZEM 24H ER(CD) 180 MG CP]; TAKE 1 CAPSULE BY MOUTH EVERY DAY  Dispense: 90 capsule; Refill: 3    If protocol passes may refill for 12 months if within 3 months of last provider visit (or a total of 15 months).             Passed - Serum potassium within the past 12 months     Lab Results   Component Value Date    Potassium 4.4 03/07/2019             Passed - Blood pressure filed in past 12 months     BP Readings from Last 1 Encounters:   09/10/19 130/80             Passed - Serum creatinine within the past 12 months     Creatinine   Date Value Ref Range Status   03/07/2019 0.81 0.70 - 1.30 mg/dL Final             diltiazem (CARDIZEM CD) 180 MG 24 hr capsule [Pharmacy Med Name: DILTIAZEM 24H ER(CD) 180 MG CP] 90 capsule 3     Sig: TAKE 1 CAPSULE BY MOUTH EVERY DAY       Calcium-Channel Blockers Protocol Passed - 11/27/2019  5:46 AM        Passed - PCP or prescribing provider visit in past 12 months or next 3 months     Last office visit with prescriber/PCP: 9/10/2019 Shira Contreras MD OR same dept: 9/10/2019 Shira Contreras MD OR same specialty: 9/10/2019 Shira Contreras MD  Last physical: 3/7/2019 Last MTM visit: Visit date not found   Next visit within 3 mo:  Visit date not found  Next physical within 3 mo: Visit date not found  Prescriber OR PCP: Shira Contreras MD  Last diagnosis associated with med order: 1. Recurrent major depressive disorder, in full remission (H)  - buPROPion (WELLBUTRIN XL) 150 MG 24 hr tablet [Pharmacy Med Name: BUPROPION HCL  MG TABLET]; TAKE 1 TABLET BY MOUTH EVERY DAY  Dispense: 90 tablet; Refill: 3  - FLUoxetine (PROZAC) 20 MG capsule [Pharmacy Med Name: FLUOXETINE HCL 20 MG CAPSULE]; TAKE 1 CAPSULE BY MOUTH EVERY DAY  Dispense: 90 capsule; Refill: 3    2. Elevated triglycerides with high cholesterol  - atorvastatin (LIPITOR) 20 MG tablet [Pharmacy Med Name: ATORVASTATIN 20 MG TABLET]; TAKE 1 TABLET BY MOUTH EVERYDAY AT BEDTIME  Dispense: 90 tablet; Refill: 3  - fenofibric acid, choline, 45 mg capsule [Pharmacy Med Name: FENOFIBRIC ACID DR 45 MG CAP]; Take 1 capsule (45 mg total) by mouth daily.  Dispense: 90 capsule; Refill: 3    3. Idiopathic chronic gout without tophus, unspecified site  - allopurinol (ZYLOPRIM) 100 MG tablet [Pharmacy Med Name: ALLOPURINOL 100 MG TABLET]; TAKE 1 TABLET BY MOUTH EVERY DAY  Dispense: 90 tablet; Refill: 3    4. Benign essential hypertension  - losartan (COZAAR) 25 MG tablet [Pharmacy Med Name: LOSARTAN POTASSIUM 25 MG TAB]; TAKE 1 TABLET BY MOUTH EVERY DAY  Dispense: 90 tablet; Refill: 3  - diltiazem (CARDIZEM CD) 180 MG 24 hr capsule [Pharmacy Med Name: DILTIAZEM 24H ER(CD) 180 MG CP]; TAKE 1 CAPSULE BY MOUTH EVERY DAY  Dispense: 90 capsule; Refill: 3    5. SVT (supraventricular tachycardia) (H)  - diltiazem (CARDIZEM CD) 180 MG 24 hr capsule [Pharmacy Med Name: DILTIAZEM 24H ER(CD) 180 MG CP]; TAKE 1 CAPSULE BY MOUTH EVERY DAY  Dispense: 90 capsule; Refill: 3    If protocol passes may refill for 12 months if within 3 months of last provider visit (or a total of 15 months).             Passed - Blood pressure filed in past 12 months     BP Readings from Last 1 Encounters:   09/10/19 130/80              fenofibric acid, choline, 45 mg capsule [Pharmacy Med Name: FENOFIBRIC ACID DR 45 MG CAP] 90 capsule 3     Sig: TAKE 1 CAPSULE (45 MG TOTAL) BY MOUTH DAILY.       There is no refill protocol information for this order        FLUoxetine (PROZAC) 20 MG capsule [Pharmacy Med Name: FLUOXETINE HCL 20 MG CAPSULE] 90 capsule 3     Sig: TAKE 1 CAPSULE BY MOUTH EVERY DAY       SSRI Refill Protocol  Passed - 11/27/2019  5:46 AM        Passed - PCP or prescribing provider visit in last year     Last office visit with prescriber/PCP: 9/10/2019 Shira Contreras MD OR same dept: 9/10/2019 Shira Contreras MD OR same specialty: 9/10/2019 Shira Contreras MD  Last physical: 3/7/2019 Last MTM visit: Visit date not found   Next visit within 3 mo: Visit date not found  Next physical within 3 mo: Visit date not found  Prescriber OR PCP: Shira Contreras MD  Last diagnosis associated with med order: 1. Recurrent major depressive disorder, in full remission (H)  - buPROPion (WELLBUTRIN XL) 150 MG 24 hr tablet [Pharmacy Med Name: BUPROPION HCL  MG TABLET]; TAKE 1 TABLET BY MOUTH EVERY DAY  Dispense: 90 tablet; Refill: 3  - FLUoxetine (PROZAC) 20 MG capsule [Pharmacy Med Name: FLUOXETINE HCL 20 MG CAPSULE]; TAKE 1 CAPSULE BY MOUTH EVERY DAY  Dispense: 90 capsule; Refill: 3    2. Elevated triglycerides with high cholesterol  - atorvastatin (LIPITOR) 20 MG tablet [Pharmacy Med Name: ATORVASTATIN 20 MG TABLET]; TAKE 1 TABLET BY MOUTH EVERYDAY AT BEDTIME  Dispense: 90 tablet; Refill: 3  - fenofibric acid, choline, 45 mg capsule [Pharmacy Med Name: FENOFIBRIC ACID DR 45 MG CAP]; Take 1 capsule (45 mg total) by mouth daily.  Dispense: 90 capsule; Refill: 3    3. Idiopathic chronic gout without tophus, unspecified site  - allopurinol (ZYLOPRIM) 100 MG tablet [Pharmacy Med Name: ALLOPURINOL 100 MG TABLET]; TAKE 1 TABLET BY MOUTH EVERY DAY  Dispense: 90 tablet; Refill: 3    4. Benign essential hypertension  - losartan (COZAAR) 25 MG  tablet [Pharmacy Med Name: LOSARTAN POTASSIUM 25 MG TAB]; TAKE 1 TABLET BY MOUTH EVERY DAY  Dispense: 90 tablet; Refill: 3  - diltiazem (CARDIZEM CD) 180 MG 24 hr capsule [Pharmacy Med Name: DILTIAZEM 24H ER(CD) 180 MG CP]; TAKE 1 CAPSULE BY MOUTH EVERY DAY  Dispense: 90 capsule; Refill: 3    5. SVT (supraventricular tachycardia) (H)  - diltiazem (CARDIZEM CD) 180 MG 24 hr capsule [Pharmacy Med Name: DILTIAZEM 24H ER(CD) 180 MG CP]; TAKE 1 CAPSULE BY MOUTH EVERY DAY  Dispense: 90 capsule; Refill: 3    If protocol passes may refill for 12 months if within 3 months of last provider visit (or a total of 15 months).

## 2021-06-03 NOTE — TELEPHONE ENCOUNTER
RN cannot approve Refill Request    RN can NOT refill this medication med is not covered by policy/route to provider. Last office visit: 9/10/2019 Shira Contreras MD Last Physical: 3/7/2019 Last MTM visit: Visit date not found Last visit same specialty: 9/10/2019 Shira Contreras MD.  Next visit within 3 mo: Visit date not found  Next physical within 3 mo: Visit date not found      Jackie Sandoval, [facility] Triage/Med Refill 12/1/2019    Requested Prescriptions   Pending Prescriptions Disp Refills     OLANZapine (ZYPREXA) 5 MG tablet [Pharmacy Med Name: OLANZAPINE 5 MG TABLET] 90 tablet 0     Sig: TAKE 1 TABLET BY MOUTH EVERY DAY       There is no refill protocol information for this order

## 2021-06-03 NOTE — TELEPHONE ENCOUNTER
Refill Approved    Rx renewed per Medication Renewal Policy. Medication was last renewed on   allopurinol (ZYLOPRIM) 100 MG tablet 90 tablet 3 12/4/2018     atorvastatin (LIPITOR) 20 MG tablet 90 tablet 3 12/4/2018     buPROPion (WELLBUTRIN XL) 150 MG 24 hr tablet 90 tablet 3 12/4/2018     diltiazem (TIAZAC) 180 MG 24 hr capsule 90 capsule 3 12/4/2018     FLUoxetine (PROZAC) 20 MG capsule 90 capsule 3 12/4/2018     losartan (COZAAR) 25 MG tablet 90 tablet 3 12/4/2018     OV 9/10/19  Jeni Springer, [facility] Triage/Med Refill 11/28/2019     Requested Prescriptions   Pending Prescriptions Disp Refills     buPROPion (WELLBUTRIN XL) 150 MG 24 hr tablet [Pharmacy Med Name: BUPROPION HCL  MG TABLET] 90 tablet 3     Sig: TAKE 1 TABLET BY MOUTH EVERY DAY       Tricyclics/Misc Antidepressant/Antianxiety Meds Refill Protocol Passed - 11/27/2019  5:46 AM        Passed - PCP or prescribing provider visit in last year     Last office visit with prescriber/PCP: 9/10/2019 Shira Contreras MD OR same dept: 9/10/2019 Shira Contreras MD OR same specialty: 9/10/2019 Shira Contreras MD  Last physical: 3/7/2019 Last MTM visit: Visit date not found   Next visit within 3 mo: Visit date not found  Next physical within 3 mo: Visit date not found  Prescriber OR PCP: Shira Contreras MD  Last diagnosis associated with med order: 1. Recurrent major depressive disorder, in full remission (H)  - buPROPion (WELLBUTRIN XL) 150 MG 24 hr tablet [Pharmacy Med Name: BUPROPION HCL  MG TABLET]; TAKE 1 TABLET BY MOUTH EVERY DAY  Dispense: 90 tablet; Refill: 3  - FLUoxetine (PROZAC) 20 MG capsule [Pharmacy Med Name: FLUOXETINE HCL 20 MG CAPSULE]; TAKE 1 CAPSULE BY MOUTH EVERY DAY  Dispense: 90 capsule; Refill: 3    2. Elevated triglycerides with high cholesterol  - atorvastatin (LIPITOR) 20 MG tablet [Pharmacy Med Name: ATORVASTATIN 20 MG TABLET]; TAKE 1 TABLET BY MOUTH EVERYDAY AT BEDTIME  Dispense: 90 tablet; Refill: 3  -  fenofibric acid, choline, 45 mg capsule [Pharmacy Med Name: FENOFIBRIC ACID DR 45 MG CAP]; Take 1 capsule (45 mg total) by mouth daily.  Dispense: 90 capsule; Refill: 3    3. Idiopathic chronic gout without tophus, unspecified site  - allopurinol (ZYLOPRIM) 100 MG tablet [Pharmacy Med Name: ALLOPURINOL 100 MG TABLET]; TAKE 1 TABLET BY MOUTH EVERY DAY  Dispense: 90 tablet; Refill: 3    4. Benign essential hypertension  - losartan (COZAAR) 25 MG tablet [Pharmacy Med Name: LOSARTAN POTASSIUM 25 MG TAB]; TAKE 1 TABLET BY MOUTH EVERY DAY  Dispense: 90 tablet; Refill: 3  - diltiazem (CARDIZEM CD) 180 MG 24 hr capsule [Pharmacy Med Name: DILTIAZEM 24H ER(CD) 180 MG CP]; TAKE 1 CAPSULE BY MOUTH EVERY DAY  Dispense: 90 capsule; Refill: 3    5. SVT (supraventricular tachycardia) (H)  - diltiazem (CARDIZEM CD) 180 MG 24 hr capsule [Pharmacy Med Name: DILTIAZEM 24H ER(CD) 180 MG CP]; TAKE 1 CAPSULE BY MOUTH EVERY DAY  Dispense: 90 capsule; Refill: 3    If protocol passes may refill for 12 months if within 3 months of last provider visit (or a total of 15 months).             atorvastatin (LIPITOR) 20 MG tablet [Pharmacy Med Name: ATORVASTATIN 20 MG TABLET] 90 tablet 3     Sig: TAKE 1 TABLET BY MOUTH EVERYDAY AT BEDTIME       Statins Refill Protocol (Hmg CoA Reductase Inhibitors) Passed - 11/27/2019  5:46 AM        Passed - PCP or prescribing provider visit in past 12 months      Last office visit with prescriber/PCP: 9/10/2019 Shira Contreras MD OR same dept: 9/10/2019 Shira Contreras MD OR same specialty: 9/10/2019 Shira Contreras MD  Last physical: 3/7/2019 Last MTM visit: Visit date not found   Next visit within 3 mo: Visit date not found  Next physical within 3 mo: Visit date not found  Prescriber OR PCP: Shira Contreras MD  Last diagnosis associated with med order: 1. Recurrent major depressive disorder, in full remission (H)  - buPROPion (WELLBUTRIN XL) 150 MG 24 hr tablet [Pharmacy Med Name: BUPROPION HCL   MG TABLET]; TAKE 1 TABLET BY MOUTH EVERY DAY  Dispense: 90 tablet; Refill: 3  - FLUoxetine (PROZAC) 20 MG capsule [Pharmacy Med Name: FLUOXETINE HCL 20 MG CAPSULE]; TAKE 1 CAPSULE BY MOUTH EVERY DAY  Dispense: 90 capsule; Refill: 3    2. Elevated triglycerides with high cholesterol  - atorvastatin (LIPITOR) 20 MG tablet [Pharmacy Med Name: ATORVASTATIN 20 MG TABLET]; TAKE 1 TABLET BY MOUTH EVERYDAY AT BEDTIME  Dispense: 90 tablet; Refill: 3  - fenofibric acid, choline, 45 mg capsule [Pharmacy Med Name: FENOFIBRIC ACID DR 45 MG CAP]; Take 1 capsule (45 mg total) by mouth daily.  Dispense: 90 capsule; Refill: 3    3. Idiopathic chronic gout without tophus, unspecified site  - allopurinol (ZYLOPRIM) 100 MG tablet [Pharmacy Med Name: ALLOPURINOL 100 MG TABLET]; TAKE 1 TABLET BY MOUTH EVERY DAY  Dispense: 90 tablet; Refill: 3    4. Benign essential hypertension  - losartan (COZAAR) 25 MG tablet [Pharmacy Med Name: LOSARTAN POTASSIUM 25 MG TAB]; TAKE 1 TABLET BY MOUTH EVERY DAY  Dispense: 90 tablet; Refill: 3  - diltiazem (CARDIZEM CD) 180 MG 24 hr capsule [Pharmacy Med Name: DILTIAZEM 24H ER(CD) 180 MG CP]; TAKE 1 CAPSULE BY MOUTH EVERY DAY  Dispense: 90 capsule; Refill: 3    5. SVT (supraventricular tachycardia) (H)  - diltiazem (CARDIZEM CD) 180 MG 24 hr capsule [Pharmacy Med Name: DILTIAZEM 24H ER(CD) 180 MG CP]; TAKE 1 CAPSULE BY MOUTH EVERY DAY  Dispense: 90 capsule; Refill: 3    If protocol passes may refill for 12 months if within 3 months of last provider visit (or a total of 15 months).             allopurinol (ZYLOPRIM) 100 MG tablet [Pharmacy Med Name: ALLOPURINOL 100 MG TABLET] 90 tablet 3     Sig: TAKE 1 TABLET BY MOUTH EVERY DAY       Allopurinol/Febuxostat Refill Protocol  Passed - 11/27/2019  5:46 AM        Passed - LFT or AST or ALT in last 12 months     Albumin   Date Value Ref Range Status   09/10/2019 3.9 3.5 - 5.0 g/dL Final     Bilirubin, Total   Date Value Ref Range Status   09/10/2019 0.3 0.0 -  1.0 mg/dL Final     Bilirubin, Direct   Date Value Ref Range Status   09/10/2019 0.2 <=0.5 mg/dL Final     Alkaline Phosphatase   Date Value Ref Range Status   09/10/2019 106 45 - 120 U/L Final     AST   Date Value Ref Range Status   09/10/2019 82 (H) 0 - 40 U/L Final     ALT   Date Value Ref Range Status   09/10/2019 90 (H) 0 - 45 U/L Final     Protein, Total   Date Value Ref Range Status   09/10/2019 7.0 6.0 - 8.0 g/dL Final                Passed - Visit with PCP or prescribing provider visit in past 12 months     Last office visit with prescriber/PCP: 9/10/2019 Shira Contreras MD OR same dept: 9/10/2019 Shira Contreras MD OR same specialty: 9/10/2019 Shira Contreras MD  Last physical: 3/7/2019 Last MTM visit: Visit date not found   Next visit within 3 mo: Visit date not found  Next physical within 3 mo: Visit date not found  Prescriber OR PCP: Shira Contreras MD  Last diagnosis associated with med order: 1. Recurrent major depressive disorder, in full remission (H)  - buPROPion (WELLBUTRIN XL) 150 MG 24 hr tablet [Pharmacy Med Name: BUPROPION HCL  MG TABLET]; TAKE 1 TABLET BY MOUTH EVERY DAY  Dispense: 90 tablet; Refill: 3  - FLUoxetine (PROZAC) 20 MG capsule [Pharmacy Med Name: FLUOXETINE HCL 20 MG CAPSULE]; TAKE 1 CAPSULE BY MOUTH EVERY DAY  Dispense: 90 capsule; Refill: 3    2. Elevated triglycerides with high cholesterol  - atorvastatin (LIPITOR) 20 MG tablet [Pharmacy Med Name: ATORVASTATIN 20 MG TABLET]; TAKE 1 TABLET BY MOUTH EVERYDAY AT BEDTIME  Dispense: 90 tablet; Refill: 3  - fenofibric acid, choline, 45 mg capsule [Pharmacy Med Name: FENOFIBRIC ACID DR 45 MG CAP]; Take 1 capsule (45 mg total) by mouth daily.  Dispense: 90 capsule; Refill: 3    3. Idiopathic chronic gout without tophus, unspecified site  - allopurinol (ZYLOPRIM) 100 MG tablet [Pharmacy Med Name: ALLOPURINOL 100 MG TABLET]; TAKE 1 TABLET BY MOUTH EVERY DAY  Dispense: 90 tablet; Refill: 3    4. Benign essential  hypertension  - losartan (COZAAR) 25 MG tablet [Pharmacy Med Name: LOSARTAN POTASSIUM 25 MG TAB]; TAKE 1 TABLET BY MOUTH EVERY DAY  Dispense: 90 tablet; Refill: 3  - diltiazem (CARDIZEM CD) 180 MG 24 hr capsule [Pharmacy Med Name: DILTIAZEM 24H ER(CD) 180 MG CP]; TAKE 1 CAPSULE BY MOUTH EVERY DAY  Dispense: 90 capsule; Refill: 3    5. SVT (supraventricular tachycardia) (H)  - diltiazem (CARDIZEM CD) 180 MG 24 hr capsule [Pharmacy Med Name: DILTIAZEM 24H ER(CD) 180 MG CP]; TAKE 1 CAPSULE BY MOUTH EVERY DAY  Dispense: 90 capsule; Refill: 3    If protocol passes may refill for 12 months if within 3 months of last provider visit (or a total of 15 months).             losartan (COZAAR) 25 MG tablet [Pharmacy Med Name: LOSARTAN POTASSIUM 25 MG TAB] 90 tablet 3     Sig: TAKE 1 TABLET BY MOUTH EVERY DAY       Angiotensin Receptor Blocker Protocol Passed - 11/27/2019  5:46 AM        Passed - PCP or prescribing provider visit in past 12 months       Last office visit with prescriber/PCP: 9/10/2019 Shira Contreras MD OR same dept: 9/10/2019 Shira Contreras MD OR same specialty: 9/10/2019 Shira Contreras MD  Last physical: 3/7/2019 Last MTM visit: Visit date not found   Next visit within 3 mo: Visit date not found  Next physical within 3 mo: Visit date not found  Prescriber OR PCP: Shira Contreras MD  Last diagnosis associated with med order: 1. Recurrent major depressive disorder, in full remission (H)  - buPROPion (WELLBUTRIN XL) 150 MG 24 hr tablet [Pharmacy Med Name: BUPROPION HCL  MG TABLET]; TAKE 1 TABLET BY MOUTH EVERY DAY  Dispense: 90 tablet; Refill: 3  - FLUoxetine (PROZAC) 20 MG capsule [Pharmacy Med Name: FLUOXETINE HCL 20 MG CAPSULE]; TAKE 1 CAPSULE BY MOUTH EVERY DAY  Dispense: 90 capsule; Refill: 3    2. Elevated triglycerides with high cholesterol  - atorvastatin (LIPITOR) 20 MG tablet [Pharmacy Med Name: ATORVASTATIN 20 MG TABLET]; TAKE 1 TABLET BY MOUTH EVERYDAY AT BEDTIME  Dispense: 90 tablet;  Refill: 3  - fenofibric acid, choline, 45 mg capsule [Pharmacy Med Name: FENOFIBRIC ACID DR 45 MG CAP]; Take 1 capsule (45 mg total) by mouth daily.  Dispense: 90 capsule; Refill: 3    3. Idiopathic chronic gout without tophus, unspecified site  - allopurinol (ZYLOPRIM) 100 MG tablet [Pharmacy Med Name: ALLOPURINOL 100 MG TABLET]; TAKE 1 TABLET BY MOUTH EVERY DAY  Dispense: 90 tablet; Refill: 3    4. Benign essential hypertension  - losartan (COZAAR) 25 MG tablet [Pharmacy Med Name: LOSARTAN POTASSIUM 25 MG TAB]; TAKE 1 TABLET BY MOUTH EVERY DAY  Dispense: 90 tablet; Refill: 3  - diltiazem (CARDIZEM CD) 180 MG 24 hr capsule [Pharmacy Med Name: DILTIAZEM 24H ER(CD) 180 MG CP]; TAKE 1 CAPSULE BY MOUTH EVERY DAY  Dispense: 90 capsule; Refill: 3    5. SVT (supraventricular tachycardia) (H)  - diltiazem (CARDIZEM CD) 180 MG 24 hr capsule [Pharmacy Med Name: DILTIAZEM 24H ER(CD) 180 MG CP]; TAKE 1 CAPSULE BY MOUTH EVERY DAY  Dispense: 90 capsule; Refill: 3    If protocol passes may refill for 12 months if within 3 months of last provider visit (or a total of 15 months).             Passed - Serum potassium within the past 12 months     Lab Results   Component Value Date    Potassium 4.4 03/07/2019             Passed - Blood pressure filed in past 12 months     BP Readings from Last 1 Encounters:   09/10/19 130/80             Passed - Serum creatinine within the past 12 months     Creatinine   Date Value Ref Range Status   03/07/2019 0.81 0.70 - 1.30 mg/dL Final             diltiazem (CARDIZEM CD) 180 MG 24 hr capsule [Pharmacy Med Name: DILTIAZEM 24H ER(CD) 180 MG CP] 90 capsule 3     Sig: TAKE 1 CAPSULE BY MOUTH EVERY DAY       Calcium-Channel Blockers Protocol Passed - 11/27/2019  5:46 AM        Passed - PCP or prescribing provider visit in past 12 months or next 3 months     Last office visit with prescriber/PCP: 9/10/2019 Shira Contreras MD OR same dept: 9/10/2019 Shira Contreras MD OR same specialty: 9/10/2019  Shira Contreras MD  Last physical: 3/7/2019 Last MTM visit: Visit date not found   Next visit within 3 mo: Visit date not found  Next physical within 3 mo: Visit date not found  Prescriber OR PCP: Shira Contreras MD  Last diagnosis associated with med order: 1. Recurrent major depressive disorder, in full remission (H)  - buPROPion (WELLBUTRIN XL) 150 MG 24 hr tablet [Pharmacy Med Name: BUPROPION HCL  MG TABLET]; TAKE 1 TABLET BY MOUTH EVERY DAY  Dispense: 90 tablet; Refill: 3  - FLUoxetine (PROZAC) 20 MG capsule [Pharmacy Med Name: FLUOXETINE HCL 20 MG CAPSULE]; TAKE 1 CAPSULE BY MOUTH EVERY DAY  Dispense: 90 capsule; Refill: 3    2. Elevated triglycerides with high cholesterol  - atorvastatin (LIPITOR) 20 MG tablet [Pharmacy Med Name: ATORVASTATIN 20 MG TABLET]; TAKE 1 TABLET BY MOUTH EVERYDAY AT BEDTIME  Dispense: 90 tablet; Refill: 3  - fenofibric acid, choline, 45 mg capsule [Pharmacy Med Name: FENOFIBRIC ACID DR 45 MG CAP]; Take 1 capsule (45 mg total) by mouth daily.  Dispense: 90 capsule; Refill: 3    3. Idiopathic chronic gout without tophus, unspecified site  - allopurinol (ZYLOPRIM) 100 MG tablet [Pharmacy Med Name: ALLOPURINOL 100 MG TABLET]; TAKE 1 TABLET BY MOUTH EVERY DAY  Dispense: 90 tablet; Refill: 3    4. Benign essential hypertension  - losartan (COZAAR) 25 MG tablet [Pharmacy Med Name: LOSARTAN POTASSIUM 25 MG TAB]; TAKE 1 TABLET BY MOUTH EVERY DAY  Dispense: 90 tablet; Refill: 3  - diltiazem (CARDIZEM CD) 180 MG 24 hr capsule [Pharmacy Med Name: DILTIAZEM 24H ER(CD) 180 MG CP]; TAKE 1 CAPSULE BY MOUTH EVERY DAY  Dispense: 90 capsule; Refill: 3    5. SVT (supraventricular tachycardia) (H)  - diltiazem (CARDIZEM CD) 180 MG 24 hr capsule [Pharmacy Med Name: DILTIAZEM 24H ER(CD) 180 MG CP]; TAKE 1 CAPSULE BY MOUTH EVERY DAY  Dispense: 90 capsule; Refill: 3    If protocol passes may refill for 12 months if within 3 months of last provider visit (or a total of 15 months).             Passed  - Blood pressure filed in past 12 months     BP Readings from Last 1 Encounters:   09/10/19 130/80             fenofibric acid, choline, 45 mg capsule [Pharmacy Med Name: FENOFIBRIC ACID DR 45 MG CAP] 90 capsule 3     Sig: TAKE 1 CAPSULE (45 MG TOTAL) BY MOUTH DAILY.       There is no refill protocol information for this order        FLUoxetine (PROZAC) 20 MG capsule [Pharmacy Med Name: FLUOXETINE HCL 20 MG CAPSULE] 90 capsule 3     Sig: TAKE 1 CAPSULE BY MOUTH EVERY DAY       SSRI Refill Protocol  Passed - 11/27/2019  5:46 AM        Passed - PCP or prescribing provider visit in last year     Last office visit with prescriber/PCP: 9/10/2019 Shira Contreras MD OR same dept: 9/10/2019 Shira Contreras MD OR same specialty: 9/10/2019 Shira Contreras MD  Last physical: 3/7/2019 Last MTM visit: Visit date not found   Next visit within 3 mo: Visit date not found  Next physical within 3 mo: Visit date not found  Prescriber OR PCP: Shira Contreras MD  Last diagnosis associated with med order: 1. Recurrent major depressive disorder, in full remission (H)  - buPROPion (WELLBUTRIN XL) 150 MG 24 hr tablet [Pharmacy Med Name: BUPROPION HCL  MG TABLET]; TAKE 1 TABLET BY MOUTH EVERY DAY  Dispense: 90 tablet; Refill: 3  - FLUoxetine (PROZAC) 20 MG capsule [Pharmacy Med Name: FLUOXETINE HCL 20 MG CAPSULE]; TAKE 1 CAPSULE BY MOUTH EVERY DAY  Dispense: 90 capsule; Refill: 3    2. Elevated triglycerides with high cholesterol  - atorvastatin (LIPITOR) 20 MG tablet [Pharmacy Med Name: ATORVASTATIN 20 MG TABLET]; TAKE 1 TABLET BY MOUTH EVERYDAY AT BEDTIME  Dispense: 90 tablet; Refill: 3  - fenofibric acid, choline, 45 mg capsule [Pharmacy Med Name: FENOFIBRIC ACID DR 45 MG CAP]; Take 1 capsule (45 mg total) by mouth daily.  Dispense: 90 capsule; Refill: 3    3. Idiopathic chronic gout without tophus, unspecified site  - allopurinol (ZYLOPRIM) 100 MG tablet [Pharmacy Med Name: ALLOPURINOL 100 MG TABLET]; TAKE 1 TABLET BY MOUTH  EVERY DAY  Dispense: 90 tablet; Refill: 3    4. Benign essential hypertension  - losartan (COZAAR) 25 MG tablet [Pharmacy Med Name: LOSARTAN POTASSIUM 25 MG TAB]; TAKE 1 TABLET BY MOUTH EVERY DAY  Dispense: 90 tablet; Refill: 3  - diltiazem (CARDIZEM CD) 180 MG 24 hr capsule [Pharmacy Med Name: DILTIAZEM 24H ER(CD) 180 MG CP]; TAKE 1 CAPSULE BY MOUTH EVERY DAY  Dispense: 90 capsule; Refill: 3    5. SVT (supraventricular tachycardia) (H)  - diltiazem (CARDIZEM CD) 180 MG 24 hr capsule [Pharmacy Med Name: DILTIAZEM 24H ER(CD) 180 MG CP]; TAKE 1 CAPSULE BY MOUTH EVERY DAY  Dispense: 90 capsule; Refill: 3    If protocol passes may refill for 12 months if within 3 months of last provider visit (or a total of 15 months).

## 2021-06-04 VITALS
DIASTOLIC BLOOD PRESSURE: 80 MMHG | WEIGHT: 265.56 LBS | BODY MASS INDEX: 39.33 KG/M2 | HEIGHT: 69 IN | SYSTOLIC BLOOD PRESSURE: 134 MMHG | HEART RATE: 83 BPM

## 2021-06-04 VITALS
HEART RATE: 77 BPM | WEIGHT: 258.56 LBS | BODY MASS INDEX: 38.18 KG/M2 | TEMPERATURE: 98.2 F | SYSTOLIC BLOOD PRESSURE: 138 MMHG | DIASTOLIC BLOOD PRESSURE: 70 MMHG

## 2021-06-05 VITALS
BODY MASS INDEX: 37.72 KG/M2 | WEIGHT: 255.44 LBS | HEART RATE: 76 BPM | SYSTOLIC BLOOD PRESSURE: 130 MMHG | DIASTOLIC BLOOD PRESSURE: 70 MMHG

## 2021-06-05 VITALS
WEIGHT: 259.5 LBS | OXYGEN SATURATION: 94 % | HEART RATE: 82 BPM | SYSTOLIC BLOOD PRESSURE: 146 MMHG | BODY MASS INDEX: 38.32 KG/M2 | DIASTOLIC BLOOD PRESSURE: 84 MMHG

## 2021-06-06 NOTE — TELEPHONE ENCOUNTER
RN triage   Call from pt   Pt states he got notice from clinic to set up PX appt   Pt made appt for tomorrow   Pt has has chest cold and congestion for 10 days   Pt has emphysema   No fever  No known exposures- no travel  Breathing OK -- no shortness of breath -- no wheeze   Sleeping OK   Not using inhalers or nebs   Swallowing and drinking OK   Still has mild sore throat and hoarseness   Taking mucinex and aleve  Per protocol = should be seen today due to emphysema   Pt has appt scheduled for tomorrow  Please advise :  OK to be seen in clinic tomorrow ?  Rosy Freeman RN [facility] RN triage      Reason for Disposition    Known COPD or other severe lung disease (i.e., bronchiectasis, cystic fibrosis, lung surgery) and worsening symptoms (i.e., increased sputum purulence or amount, increased breathing difficulty)    Protocols used: COUGH-A-OH

## 2021-06-06 NOTE — PATIENT INSTRUCTIONS - HE
Cough - likely viral/cold  -continue over the counter remedies   -lots of water  -handwashing  -if still having issues in 1 week or if new symptoms develop (fever, shortness of breath) then call clinic and we will get you seen at appropriate location    Blood work today - will call or send [employer] message with results    PCV13 (pnemonia vaccine) given today    Faint/soft heart murmur noted today - most likely related to current illness but should recheck in 1 month to assure resolution

## 2021-06-06 NOTE — TELEPHONE ENCOUNTER
If pt is having cough/shortness of breath with fever then he should be triaged with coronavirus protocol. If not meeting criteria then ok to come to our clinic. If feeling stable and no significant shortness of breath then can wait until tomorrow, otherwise should be seen today.    Dr Contreras

## 2021-06-06 NOTE — TELEPHONE ENCOUNTER
Clarified with Dr. Contreras as patient currently does not have a fever per triage message.  Per Dr. Contreras ok for evaluation in clinic tomorrow but patient should be seen more urgently if fever, shortness of breath, wheezing develops or patient feels symptoms have worsened.

## 2021-06-06 NOTE — TELEPHONE ENCOUNTER
RN cannot approve Refill Request    RN can NOT refill this medication med is not covered by policy/route to provider. Last office visit: 9/10/2019 Shira Contreras MD Last Physical: 3/7/2019 Last MTM visit: Visit date not found Last visit same specialty: 9/10/2019 Shira Contreras MD.  Next visit within 3 mo: Visit date not found  Next physical within 3 mo: Visit date not found      Jeni Springer, [facility] Triage/Med Refill 2/24/2020    Requested Prescriptions   Pending Prescriptions Disp Refills     OLANZapine (ZYPREXA) 5 MG tablet [Pharmacy Med Name: OLANZAPINE 5 MG TABLET] 90 tablet 0     Sig: TAKE 1 TABLET BY MOUTH EVERY DAY       There is no refill protocol information for this order

## 2021-06-06 NOTE — PROGRESS NOTES
Assessment/Plan:   Christopher is a 69 year old male here for physical with additional concerns.    1. Routine general medical examination at a health care facility  Physical completed today.  Labs as below.  Up-to-date with colon cancer screening.  PSA checked today for prostate cancer screening.    2. Obesity (BMI 35.0-39.9) with comorbidity (H)  BMI 39.22 today.  Discussed healthy diet and regular excise for weight loss.    3. SVT (supraventricular tachycardia) (H)  History of SVT, managed with diltiazem, asymptomatic and heart rate normal today.  Continue current management.    4. Other emphysema (H)  History emphysema, asymptomatic at this time except acute illness as below, no evidence of wheezing or flare at this time.  Vitals within normal limits.  Continue to monitor closely.    5. Recurrent major depressive disorder, in full remission (H)  History depression, currently managed with Wellbutrin  mg, Prozac 20 mg, olanzapine 5 mg.  Patient reports some worsening over the past few weeks but thinks this may be related to acute stressors and illness.  PHQ 9 score 5 today, scored 1 for thoughts of self-harm.  Jerardo 7 score 0 today.  We will continue current medications at this time and follow-up in 1 month when patient returns for heart murmur recheck, discussed today that we may need to increase medication dose.    6. Encounter for administration of vaccine  Due for PCV 13, transfer today.  - Pneumococcal conjugate vaccine 13-valent 6wks-17yrs; >50yrs    7. Benign essential hypertension  History hypertension, blood pressure controlled today, currently managing with diltiazem and losartan; patient asymptomatic, continue current medications, BMP checked today.  - Comprehensive Metabolic Panel    8. Elevated triglycerides with high cholesterol  History elevated cholesterol levels, currently taking atorvastatin, will recheck cholesterol levels today.  - Lipid [place] FASTING    9. Hepatic steatosis  10. Elevated  transaminase level  History elevated transaminase levels, ultrasound showing hepatic steatosis, patient is obese.  Will recheck LFTs today.  - Comprehensive Metabolic Panel    11. Prediabetes  History of prediabetes, will recheck A1c today.  - Glycosylated Hemoglobin A1c    12. Screening for prostate cancer  Given age, will screen for prostate cancer today with PSA; patient reports normal levels in the past.  - PSA, Annual Screen (Prostatic-Specific Antigen)    13. Heart murmur  Faint systolic ejection murmur noted today, no history of this.  Patient asymptomatic.  Possibly related to underlying acute illness as below.  Patient reports history EKGs in the past that have been normal.  Recommend follow up in 1 month for recheck.    14. URI with cough and congestion  10 days of acute illness including cough and chest/nasal congestion.  No travel or known COVID exposure.  No evidence of bacterial infection on today's exam, no evidence of emphysema flare.  Suspect viral etiology, recommend supportive cares.  Recommend follow-up if symptoms not improving over the next week or if new/worsening symptoms occur.    I have had an Advance Directives discussion with the patient.  The following high BMI interventions were performed this visit: encouragement to exercise and lifestyle education regarding diet    Follow up: 1 month for heart murmur recheck    Shira Contreras MD  [facility]    Subjective:     Christopher Lindsey is a 69 y.o. male who presents for an annual exam.     Cough, chest congestion  -started 10 days ago  -wife had similar sx - now feeling better  -chest congestion, cough, nasal congestion  -no fever or body aches  -feeling like things may be starting to improve  -no recent travel or known COVID exposure  -hx emphysema    Healthy Habits:   Healthy Diet: working on it  Regular Exercise: has been walking daily with dog, stopped gym membership  Sunscreen Use: Yes  Dental Visits Regularly: Yes  Seat Belt:  Yes    Health Maintenance reviewed:  Lipid Profile: needs  Glucose Screen: needs  Colonoscopy: up to date    Immunization History   Administered Date(s) Administered     Influenza high dose,seasonal,PF, 65+ yrs 09/10/2019     Pneumo Conj 13-V (2010&after) 03/12/2020     Pneumo Polysac 23-V 03/07/2019     Td, adult adsorbed, PF 03/07/2019     Immunization status: up to date and documented, PCV13 today.    Current Outpatient Medications   Medication Sig Dispense Refill     allopurinol (ZYLOPRIM) 100 MG tablet Take 1 tablet (100 mg total) by mouth daily. 90 tablet 3     atorvastatin (LIPITOR) 20 MG tablet Take 1 tablet (20 mg total) by mouth at bedtime. 90 tablet 3     buPROPion (WELLBUTRIN XL) 150 MG 24 hr tablet Take 1 tablet (150 mg total) by mouth daily. 90 tablet 3     diltiazem (CARDIZEM CD) 180 MG 24 hr capsule Take 1 capsule (180 mg total) by mouth daily. 90 capsule 3     fenofibric acid, choline, 45 mg capsule TAKE 1 CAPSULE (45 MG TOTAL) BY MOUTH DAILY. 90 capsule 3     FLUoxetine (PROZAC) 20 MG capsule Take 1 capsule (20 mg total) by mouth daily. 90 capsule 3     losartan (COZAAR) 25 MG tablet Take 1 tablet (25 mg total) by mouth daily. 90 tablet 3     OLANZapine (ZYPREXA) 5 MG tablet TAKE 1 TABLET BY MOUTH EVERY DAY 90 tablet 0     No current facility-administered medications for this visit.      Past Medical History:   Diagnosis Date     Benign essential hypertension 12/4/2018     Elevated triglycerides with high cholesterol 12/4/2018     TERESA (obstructive sleep apnea) 12/4/2018    Has BIPAP machine     Other emphysema (H) 12/4/2018    Hx following with pulmonology, [facility]     Patellofemoral disorder of both knees 12/4/2018     Prediabetes 12/4/2018     Recurrent major depressive disorder, in full remission (H) 12/4/2018     SVT (supraventricular tachycardia) (H) 12/4/2018    Hx 2 episodes in past year, now on diltiazem for this     Past Surgical History:   Procedure Laterality Date     HEMORROIDECTOMY   2004     INCISION AND DRAINAGE PERIRECTAL ABSCESS       Patient has no known allergies.  Family History   Problem Relation Age of Onset     Pancreatic cancer Mother 62     Depression Mother      Cancer Father 88        gallbladder     Diabetes Sister      Depression Sister      No Medical Problems Brother      Heart failure Maternal Grandmother          early 70s     Stroke Maternal Grandfather          in 80s     Heart failure Paternal Grandmother          in 90s     Heart attack Paternal Grandfather         late 60s     Heart failure Paternal Grandfather      No Medical Problems Brother      Social History     Socioeconomic History     Marital status:      Spouse name: Not on file     Number of children: Not on file     Years of education: Not on file     Highest education level: Not on file   Occupational History     Not on file   Social Needs     Financial resource strain: Not on file     Food insecurity     Worry: Not on file     Inability: Not on file     Transportation needs     Medical: Not on file     Non-medical: Not on file   Tobacco Use     Smoking status: Former Smoker     Packs/day: 1.00     Years: 40.00     Pack years: 40.00     Last attempt to quit:      Years since quittin.1     Smokeless tobacco: Never Used     Tobacco comment: still using e-cigs at lowest level, trying to quit   Substance and Sexual Activity     Alcohol use: Yes     Frequency: 4 or more times a week     Drinks per session: 5 or 6     Binge frequency: Monthly     Drug use: No     Sexual activity: Not on file   Lifestyle     Physical activity     Days per week: Not on file     Minutes per session: Not on file     Stress: Not on file   Relationships     Social connections     Talks on phone: Not on file     Gets together: Not on file     Attends Buddhism service: Not on file     Active member of club or organization: Not on file     Attends meetings of clubs or organizations: Not on file     Relationship  "status: Not on file     Intimate partner violence     Fear of current or ex partner: Not on file     Emotionally abused: Not on file     Physically abused: Not on file     Forced sexual activity: Not on file   Other Topics Concern     Not on file   Social History Narrative     Not on file       Review of Systems  General:  Denies problem  Eyes: Denies problem except wears glasses  Ears/Nose/Throat: Denies problem except nasal congestion  Cardiovascular: Denies problem  Respiratory:  Denies problem except cough/chest congestion  Gastrointestinal:  Denies problem   Genitourinary: Denies problem  Musculoskeletal:  Denies problem  Skin: Denies problem  Neurologic: Denies problem  Psychiatric: Denies problem  Endocrine: Denies problem  Heme/Lymphatic: Denies problem   Allergic/Immunologic: Denies problem    Objective:        Vitals:    03/12/20 0918   BP: 134/80   Pulse: 83   Weight: (!) 265 lb 9 oz (120.5 kg)   Height: 5' 9\" (1.753 m)     Body mass index is 39.22 kg/m .    Physical Exam:  General Appearance: Alert, pleasant, appears stated age, obese  Head: Normocephalic, without obvious abnormality  Eyes: PERRL, conjunctiva/corneas clear, EOM's intact  Ears: Normal TM's and external ear canals, both ears  Nose: Nares normal, septum midline,mucosa normal, no drainage  Throat: Lips, mucosa, and tongue normal; teeth and gums normal; oropharynx is clear  Neck: Supple,without lymphadenopathy, no thyromegaly or nodules noted  Lungs: Clear to auscultation bilaterally, respirations unlabored, no wheezing or crackles, cough observed  Heart: Regular rate and rhythm, faint systolic ejection murmur noted best at R sternal border   Abdomen: Soft, non-tender, no masses, no organomegaly  Extremities: Extremities with strong and symmetric pulses, no cyanosis or edema  Skin: Skin color, texture normal, no rashes or lesions  Neurologic: grossly normal, no focal deficits    "

## 2021-06-07 ENCOUNTER — COMMUNICATION - HEALTHEAST (OUTPATIENT)
Dept: FAMILY MEDICINE | Facility: CLINIC | Age: 71
End: 2021-06-07

## 2021-06-07 DIAGNOSIS — F33.42 RECURRENT MAJOR DEPRESSIVE DISORDER, IN FULL REMISSION (H): ICD-10-CM

## 2021-06-07 RX ORDER — BUPROPION HYDROCHLORIDE 150 MG/1
TABLET ORAL
Qty: 90 TABLET | Refills: 3 | Status: SHIPPED | OUTPATIENT
Start: 2021-06-07 | End: 2022-03-09

## 2021-06-08 ENCOUNTER — COMMUNICATION - HEALTHEAST (OUTPATIENT)
Dept: FAMILY MEDICINE | Facility: CLINIC | Age: 71
End: 2021-06-08

## 2021-06-08 DIAGNOSIS — E78.2 ELEVATED TRIGLYCERIDES WITH HIGH CHOLESTEROL: ICD-10-CM

## 2021-06-08 DIAGNOSIS — I47.10 SVT (SUPRAVENTRICULAR TACHYCARDIA) (H): ICD-10-CM

## 2021-06-08 DIAGNOSIS — I10 BENIGN ESSENTIAL HYPERTENSION: ICD-10-CM

## 2021-06-08 DIAGNOSIS — M1A.00X0 IDIOPATHIC CHRONIC GOUT WITHOUT TOPHUS, UNSPECIFIED SITE: ICD-10-CM

## 2021-06-08 RX ORDER — ATORVASTATIN CALCIUM 20 MG/1
TABLET, FILM COATED ORAL
Qty: 90 TABLET | Refills: 3 | Status: SHIPPED | OUTPATIENT
Start: 2021-06-08 | End: 2022-03-09

## 2021-06-08 RX ORDER — DILTIAZEM HYDROCHLORIDE 180 MG/1
CAPSULE, COATED, EXTENDED RELEASE ORAL
Qty: 90 CAPSULE | Refills: 3 | Status: SHIPPED | OUTPATIENT
Start: 2021-06-08 | End: 2022-03-09

## 2021-06-08 RX ORDER — ALLOPURINOL 100 MG/1
TABLET ORAL
Qty: 90 TABLET | Refills: 3 | Status: SHIPPED | OUTPATIENT
Start: 2021-06-08 | End: 2022-03-09

## 2021-06-09 NOTE — PATIENT INSTRUCTIONS - HE
Checking A1c (diabetes test) and liver tests today - will send mychart or call with result  If diabetes test stays the same or worsens then we should talk about starting metformin (diabetes medication) - if needed Dr Contreras would recommend starting metformin 500mg daily with either breakfast or dinner

## 2021-06-09 NOTE — PROGRESS NOTES
Assessment/Plan:    1. Type 2 diabetes mellitus without complication, without long-term current use of insulin (H)  Last A1c 6.5% in March 2020 - pt reports diet and exercise changes as well as some weight loss - he is hopeful that his A1c has improved. Discussed if <6.5% then no medication needed but if 6.5% or above then would recommend starting metformin.  - Glycosylated Hemoglobin A1c    2. Heart murmur  No murmur appreciated today; suspect temporary murmur related to illness. No further workup needed.    3. Elevated transaminase level  4. Hepatic steatosis  Hx hepatic steatosis and elevated LFTs - LFTs have overall been stable over recent checks - will recheck today.  - Hepatic Profile      Follow up: pending lab results    Shira Contreras MD  [facility]    Subjective:    Patient ID: Christopher Lindsey is a 69 y.o. male is here today for f/u heart murmur, labs    Heart murmur  -faint systolic ejection murmur noted at last visit on 3/12/20, pt asymptomatic and no hx murmur - possibly related to illness, recommended f/u which was somewhat delayed d/t COVID19  -no chest pain, palpitation, shortness of breath, leg swelling  -has lost 7 lbs since last visit  -also due for A1c and LFTs today  -reports they have been making diet and exercise changes during quarantine - hopeful that A1c is better      Patient Active Problem List   Diagnosis     Elevated triglycerides with high cholesterol     Benign essential hypertension     Other emphysema (H)     Recurrent major depressive disorder, in full remission (H)     SVT (supraventricular tachycardia) (H)     TERESA (obstructive sleep apnea)     Bilateral primary osteoarthritis of knee     Obesity (BMI 35.0-39.9) with comorbidity (H)     Obesity hypoventilation syndrome (H)     Restrictive lung disease     Hearing problem of both ears     Multiple thyroid nodules     Abnormal liver ultrasound     Hx of colonic polyps     Hx of gout     Type 2 diabetes mellitus  without complication, without long-term current use of insulin (H)     Past Medical History:   Diagnosis Date     Benign essential hypertension 12/4/2018     Elevated triglycerides with high cholesterol 12/4/2018     TERESA (obstructive sleep apnea) 12/4/2018    Has BIPAP machine     Other emphysema (H) 12/4/2018    Hx following with pulmonology, [facility]     Patellofemoral disorder of both knees 12/4/2018     Prediabetes 12/4/2018     Recurrent major depressive disorder, in full remission (H) 12/4/2018     SVT (supraventricular tachycardia) (H) 12/4/2018    Hx 2 episodes in past year, now on diltiazem for this     Past Surgical History:   Procedure Laterality Date     HEMORROIDECTOMY  2004     INCISION AND DRAINAGE PERIRECTAL ABSCESS       Current Outpatient Medications on File Prior to Visit   Medication Sig Dispense Refill     allopurinol (ZYLOPRIM) 100 MG tablet Take 1 tablet (100 mg total) by mouth daily. 90 tablet 3     atorvastatin (LIPITOR) 20 MG tablet Take 1 tablet (20 mg total) by mouth at bedtime. 90 tablet 3     buPROPion (WELLBUTRIN XL) 150 MG 24 hr tablet Take 1 tablet (150 mg total) by mouth daily. 90 tablet 3     diltiazem (CARDIZEM CD) 180 MG 24 hr capsule Take 1 capsule (180 mg total) by mouth daily. 90 capsule 3     fenofibric acid, choline, 45 mg capsule TAKE 1 CAPSULE (45 MG TOTAL) BY MOUTH DAILY. 90 capsule 3     FLUoxetine (PROZAC) 20 MG capsule Take 1 capsule (20 mg total) by mouth daily. 90 capsule 3     losartan (COZAAR) 25 MG tablet Take 1 tablet (25 mg total) by mouth daily. 90 tablet 3     OLANZapine (ZYPREXA) 5 MG tablet TAKE 1 TABLET BY MOUTH EVERY DAY 90 tablet 0     No current facility-administered medications on file prior to visit.      No Known Allergies  Social History     Socioeconomic History     Marital status:      Spouse name: Not on file     Number of children: Not on file     Years of education: Not on file     Highest education level: Not on file   Occupational  History     Not on file   Social Needs     Financial resource strain: Not on file     Food insecurity     Worry: Not on file     Inability: Not on file     Transportation needs     Medical: Not on file     Non-medical: Not on file   Tobacco Use     Smoking status: Former Smoker     Packs/day: 1.00     Years: 40.00     Pack years: 40.00     Last attempt to quit:      Years since quittin.5     Smokeless tobacco: Never Used     Tobacco comment: still using e-cigs at lowest level, trying to quit   Substance and Sexual Activity     Alcohol use: Yes     Frequency: 4 or more times a week     Drinks per session: 5 or 6     Binge frequency: Monthly     Drug use: No     Sexual activity: Not on file   Lifestyle     Physical activity     Days per week: Not on file     Minutes per session: Not on file     Stress: Not on file   Relationships     Social connections     Talks on phone: Not on file     Gets together: Not on file     Attends Sabianism service: Not on file     Active member of club or organization: Not on file     Attends meetings of clubs or organizations: Not on file     Relationship status: Not on file     Intimate partner violence     Fear of current or ex partner: Not on file     Emotionally abused: Not on file     Physically abused: Not on file     Forced sexual activity: Not on file   Other Topics Concern     Not on file   Social History Narrative     Not on file     Family History   Problem Relation Age of Onset     Pancreatic cancer Mother 62     Depression Mother      Cancer Father 88        gallbladder     Diabetes Sister      Depression Sister      No Medical Problems Brother      Heart failure Maternal Grandmother          early 70s     Stroke Maternal Grandfather          in 80s     Heart failure Paternal Grandmother          in 90s     Heart attack Paternal Grandfather         late 60s     Heart failure Paternal Grandfather      No Medical Problems Brother      Review of systems is  as stated in HPI, and the remainder of system review is otherwise negative.    Objective:      /70   Pulse 77   Temp 98.2  F (36.8  C)   Wt (!) 258 lb 9 oz (117.3 kg)   BMI 38.18 kg/m      General appearance: awake, NAD, obese  HEENT: atraumatic, normocephalic, no scleral icterus or injection  CV: RRR, no murmurs/rubs/gallops, normal S1 and S2  Lungs: CTAB, no wheezes or crackles, breathing comfortably on room air  Extremities: no LE edema bilaterally, moving all extremities  Skin: no rashes or lesions  Neuro: alert, oriented x3, CNs grossly intact, no focal deficits appreciated  Psych: normal mood/affect/behavior, answering questions appropriately, linear thought process

## 2021-06-13 NOTE — TELEPHONE ENCOUNTER
FYI - Status Update  Who is Calling: Patient  Update: Patient aware he is due for medication follow up in January. He was sent to a  to assist with this appointment.  Okay to leave a detailed message?:  Yes

## 2021-06-13 NOTE — TELEPHONE ENCOUNTER
Refill Approved    Rx renewed per Medication Renewal Policy. Medication was last renewed on 11/28/19.    Kelly Camilo, [facility] Triage/Med Refill 12/15/2020     Requested Prescriptions   Pending Prescriptions Disp Refills     buPROPion (WELLBUTRIN XL) 150 MG 24 hr tablet 90 tablet 3     Sig: Take 1 tablet (150 mg total) by mouth daily.       Tricyclics/Misc Antidepressant/Antianxiety Meds Refill Protocol Passed - 12/14/2020  2:46 PM        Passed - PCP or prescribing provider visit in last year     Last office visit with prescriber/PCP: 7/7/2020 Shira Contreras MD OR same dept: 7/7/2020 Shira Contreras MD OR same specialty: 7/7/2020 Shira Contreras MD  Last physical: 3/12/2020 Last MTM visit: Visit date not found   Next visit within 3 mo: Visit date not found  Next physical within 3 mo: Visit date not found  Prescriber OR PCP: Shira Contreras MD  Last diagnosis associated with med order: 1. Recurrent major depressive disorder, in full remission (H)  - buPROPion (WELLBUTRIN XL) 150 MG 24 hr tablet; Take 1 tablet (150 mg total) by mouth daily.  Dispense: 90 tablet; Refill: 3  - OLANZapine (ZYPREXA) 5 MG tablet; Take 1 tablet (5 mg total) by mouth daily.  Dispense: 90 tablet; Refill: 0  - FLUoxetine (PROZAC) 20 MG capsule; Take 1 capsule (20 mg total) by mouth daily.  Dispense: 90 capsule; Refill: 3    2. Idiopathic chronic gout without tophus, unspecified site  - allopurinoL (ZYLOPRIM) 100 MG tablet; Take 1 tablet (100 mg total) by mouth daily.  Dispense: 90 tablet; Refill: 3    3. Elevated triglycerides with high cholesterol  - atorvastatin (LIPITOR) 20 MG tablet; Take 1 tablet (20 mg total) by mouth at bedtime.  Dispense: 90 tablet; Refill: 3    4. SVT (supraventricular tachycardia) (H)  - diltiazem (CARDIZEM CD) 180 MG 24 hr capsule; Take 1 capsule (180 mg total) by mouth daily.  Dispense: 90 capsule; Refill: 3    5. Benign essential hypertension  - diltiazem (CARDIZEM CD) 180 MG 24 hr capsule; Take  1 capsule (180 mg total) by mouth daily.  Dispense: 90 capsule; Refill: 3  - losartan (COZAAR) 25 MG tablet; Take 1 tablet (25 mg total) by mouth daily.  Dispense: 90 tablet; Refill: 3    If protocol passes may refill for 12 months if within 3 months of last provider visit (or a total of 15 months).                allopurinoL (ZYLOPRIM) 100 MG tablet 90 tablet 3     Sig: Take 1 tablet (100 mg total) by mouth daily.       Allopurinol/Febuxostat Refill Protocol  Passed - 12/14/2020  2:46 PM        Passed - LFT or AST or ALT in last 12 months     Albumin   Date Value Ref Range Status   07/07/2020 3.6 3.5 - 5.0 g/dL Final     Bilirubin, Total   Date Value Ref Range Status   07/07/2020 0.4 0.0 - 1.0 mg/dL Final     Bilirubin, Direct   Date Value Ref Range Status   07/07/2020 0.2 <=0.5 mg/dL Final     Alkaline Phosphatase   Date Value Ref Range Status   07/07/2020 140 (H) 45 - 120 U/L Final     AST   Date Value Ref Range Status   07/07/2020 96 (H) 0 - 40 U/L Final     ALT   Date Value Ref Range Status   07/07/2020 79 (H) 0 - 45 U/L Final     Protein, Total   Date Value Ref Range Status   07/07/2020 7.3 6.0 - 8.0 g/dL Final                Passed - Visit with PCP or prescribing provider visit in past 12 months     Last office visit with prescriber/PCP: 7/7/2020 Shira Contreras MD OR same dept: 7/7/2020 Shira Contreras MD OR same specialty: 7/7/2020 Shira Contreras MD  Last physical: 3/12/2020 Last MTM visit: Visit date not found   Next visit within 3 mo: Visit date not found  Next physical within 3 mo: Visit date not found  Prescriber OR PCP: Shira Contreras MD  Last diagnosis associated with med order: 1. Recurrent major depressive disorder, in full remission (H)  - buPROPion (WELLBUTRIN XL) 150 MG 24 hr tablet; Take 1 tablet (150 mg total) by mouth daily.  Dispense: 90 tablet; Refill: 3  - OLANZapine (ZYPREXA) 5 MG tablet; Take 1 tablet (5 mg total) by mouth daily.  Dispense: 90 tablet; Refill: 0  - FLUoxetine  (PROZAC) 20 MG capsule; Take 1 capsule (20 mg total) by mouth daily.  Dispense: 90 capsule; Refill: 3    2. Idiopathic chronic gout without tophus, unspecified site  - allopurinoL (ZYLOPRIM) 100 MG tablet; Take 1 tablet (100 mg total) by mouth daily.  Dispense: 90 tablet; Refill: 3    3. Elevated triglycerides with high cholesterol  - atorvastatin (LIPITOR) 20 MG tablet; Take 1 tablet (20 mg total) by mouth at bedtime.  Dispense: 90 tablet; Refill: 3    4. SVT (supraventricular tachycardia) (H)  - diltiazem (CARDIZEM CD) 180 MG 24 hr capsule; Take 1 capsule (180 mg total) by mouth daily.  Dispense: 90 capsule; Refill: 3    5. Benign essential hypertension  - diltiazem (CARDIZEM CD) 180 MG 24 hr capsule; Take 1 capsule (180 mg total) by mouth daily.  Dispense: 90 capsule; Refill: 3  - losartan (COZAAR) 25 MG tablet; Take 1 tablet (25 mg total) by mouth daily.  Dispense: 90 tablet; Refill: 3    If protocol passes may refill for 12 months if within 3 months of last provider visit (or a total of 15 months).                atorvastatin (LIPITOR) 20 MG tablet 90 tablet 3     Sig: Take 1 tablet (20 mg total) by mouth at bedtime.       Statins Refill Protocol (Hmg CoA Reductase Inhibitors) Passed - 12/14/2020  2:46 PM        Passed - PCP or prescribing provider visit in past 12 months      Last office visit with prescriber/PCP: 7/7/2020 Shira Contreras MD OR same dept: 7/7/2020 Shira Contreras MD OR same specialty: 7/7/2020 Shira Contreras MD  Last physical: 3/12/2020 Last MTM visit: Visit date not found   Next visit within 3 mo: Visit date not found  Next physical within 3 mo: Visit date not found  Prescriber OR PCP: Shira Contreras MD  Last diagnosis associated with med order: 1. Recurrent major depressive disorder, in full remission (H)  - buPROPion (WELLBUTRIN XL) 150 MG 24 hr tablet; Take 1 tablet (150 mg total) by mouth daily.  Dispense: 90 tablet; Refill: 3  - OLANZapine (ZYPREXA) 5 MG tablet; Take 1 tablet (5  mg total) by mouth daily.  Dispense: 90 tablet; Refill: 0  - FLUoxetine (PROZAC) 20 MG capsule; Take 1 capsule (20 mg total) by mouth daily.  Dispense: 90 capsule; Refill: 3    2. Idiopathic chronic gout without tophus, unspecified site  - allopurinoL (ZYLOPRIM) 100 MG tablet; Take 1 tablet (100 mg total) by mouth daily.  Dispense: 90 tablet; Refill: 3    3. Elevated triglycerides with high cholesterol  - atorvastatin (LIPITOR) 20 MG tablet; Take 1 tablet (20 mg total) by mouth at bedtime.  Dispense: 90 tablet; Refill: 3    4. SVT (supraventricular tachycardia) (H)  - diltiazem (CARDIZEM CD) 180 MG 24 hr capsule; Take 1 capsule (180 mg total) by mouth daily.  Dispense: 90 capsule; Refill: 3    5. Benign essential hypertension  - diltiazem (CARDIZEM CD) 180 MG 24 hr capsule; Take 1 capsule (180 mg total) by mouth daily.  Dispense: 90 capsule; Refill: 3  - losartan (COZAAR) 25 MG tablet; Take 1 tablet (25 mg total) by mouth daily.  Dispense: 90 tablet; Refill: 3    If protocol passes may refill for 12 months if within 3 months of last provider visit (or a total of 15 months).                diltiazem (CARDIZEM CD) 180 MG 24 hr capsule 90 capsule 3     Sig: Take 1 capsule (180 mg total) by mouth daily.       Calcium-Channel Blockers Protocol Passed - 12/14/2020  2:46 PM        Passed - PCP or prescribing provider visit in past 12 months or next 3 months     Last office visit with prescriber/PCP: 7/7/2020 Shira Contreras MD OR same dept: 7/7/2020 Shira Contreras MD OR same specialty: 7/7/2020 Shira Contreras MD  Last physical: 3/12/2020 Last MTM visit: Visit date not found   Next visit within 3 mo: Visit date not found  Next physical within 3 mo: Visit date not found  Prescriber OR PCP: Shira Contreras MD  Last diagnosis associated with med order: 1. Recurrent major depressive disorder, in full remission (H)  - buPROPion (WELLBUTRIN XL) 150 MG 24 hr tablet; Take 1 tablet (150 mg total) by mouth daily.  Dispense:  90 tablet; Refill: 3  - OLANZapine (ZYPREXA) 5 MG tablet; Take 1 tablet (5 mg total) by mouth daily.  Dispense: 90 tablet; Refill: 0  - FLUoxetine (PROZAC) 20 MG capsule; Take 1 capsule (20 mg total) by mouth daily.  Dispense: 90 capsule; Refill: 3    2. Idiopathic chronic gout without tophus, unspecified site  - allopurinoL (ZYLOPRIM) 100 MG tablet; Take 1 tablet (100 mg total) by mouth daily.  Dispense: 90 tablet; Refill: 3    3. Elevated triglycerides with high cholesterol  - atorvastatin (LIPITOR) 20 MG tablet; Take 1 tablet (20 mg total) by mouth at bedtime.  Dispense: 90 tablet; Refill: 3    4. SVT (supraventricular tachycardia) (H)  - diltiazem (CARDIZEM CD) 180 MG 24 hr capsule; Take 1 capsule (180 mg total) by mouth daily.  Dispense: 90 capsule; Refill: 3    5. Benign essential hypertension  - diltiazem (CARDIZEM CD) 180 MG 24 hr capsule; Take 1 capsule (180 mg total) by mouth daily.  Dispense: 90 capsule; Refill: 3  - losartan (COZAAR) 25 MG tablet; Take 1 tablet (25 mg total) by mouth daily.  Dispense: 90 tablet; Refill: 3    If protocol passes may refill for 12 months if within 3 months of last provider visit (or a total of 15 months).             Passed - Blood pressure filed in past 12 months     BP Readings from Last 1 Encounters:   07/07/20 138/70                OLANZapine (ZYPREXA) 5 MG tablet 90 tablet 0     Sig: Take 1 tablet (5 mg total) by mouth daily.       There is no refill protocol information for this order        losartan (COZAAR) 25 MG tablet 90 tablet 3     Sig: Take 1 tablet (25 mg total) by mouth daily.       Angiotensin Receptor Blocker Protocol Passed - 12/14/2020  2:46 PM        Passed - PCP or prescribing provider visit in past 12 months       Last office visit with prescriber/PCP: 7/7/2020 Shira Contreras MD OR same dept: 7/7/2020 Shira Contreras MD OR same specialty: 7/7/2020 Shira Contreras MD  Last physical: 3/12/2020 Last MTM visit: Visit date not found   Next visit  within 3 mo: Visit date not found  Next physical within 3 mo: Visit date not found  Prescriber OR PCP: Shira Contreras MD  Last diagnosis associated with med order: 1. Recurrent major depressive disorder, in full remission (H)  - buPROPion (WELLBUTRIN XL) 150 MG 24 hr tablet; Take 1 tablet (150 mg total) by mouth daily.  Dispense: 90 tablet; Refill: 3  - OLANZapine (ZYPREXA) 5 MG tablet; Take 1 tablet (5 mg total) by mouth daily.  Dispense: 90 tablet; Refill: 0  - FLUoxetine (PROZAC) 20 MG capsule; Take 1 capsule (20 mg total) by mouth daily.  Dispense: 90 capsule; Refill: 3    2. Idiopathic chronic gout without tophus, unspecified site  - allopurinoL (ZYLOPRIM) 100 MG tablet; Take 1 tablet (100 mg total) by mouth daily.  Dispense: 90 tablet; Refill: 3    3. Elevated triglycerides with high cholesterol  - atorvastatin (LIPITOR) 20 MG tablet; Take 1 tablet (20 mg total) by mouth at bedtime.  Dispense: 90 tablet; Refill: 3    4. SVT (supraventricular tachycardia) (H)  - diltiazem (CARDIZEM CD) 180 MG 24 hr capsule; Take 1 capsule (180 mg total) by mouth daily.  Dispense: 90 capsule; Refill: 3    5. Benign essential hypertension  - diltiazem (CARDIZEM CD) 180 MG 24 hr capsule; Take 1 capsule (180 mg total) by mouth daily.  Dispense: 90 capsule; Refill: 3  - losartan (COZAAR) 25 MG tablet; Take 1 tablet (25 mg total) by mouth daily.  Dispense: 90 tablet; Refill: 3    If protocol passes may refill for 12 months if within 3 months of last provider visit (or a total of 15 months).             Passed - Serum potassium within the past 12 months     Lab Results   Component Value Date    Potassium 4.4 03/12/2020             Passed - Blood pressure filed in past 12 months     BP Readings from Last 1 Encounters:   07/07/20 138/70             Passed - Serum creatinine within the past 12 months     Creatinine   Date Value Ref Range Status   03/12/2020 0.76 0.70 - 1.30 mg/dL Final                FLUoxetine (PROZAC) 20 MG capsule  90 capsule 3     Sig: Take 1 capsule (20 mg total) by mouth daily.       SSRI Refill Protocol  Passed - 12/14/2020  2:46 PM        Passed - PCP or prescribing provider visit in last year     Last office visit with prescriber/PCP: 7/7/2020 Shira Contreras MD OR same dept: 7/7/2020 Shira Contreras MD OR same specialty: 7/7/2020 Shira Contreras MD  Last physical: 3/12/2020 Last MTM visit: Visit date not found   Next visit within 3 mo: Visit date not found  Next physical within 3 mo: Visit date not found  Prescriber OR PCP: Shira Contreras MD  Last diagnosis associated with med order: 1. Recurrent major depressive disorder, in full remission (H)  - buPROPion (WELLBUTRIN XL) 150 MG 24 hr tablet; Take 1 tablet (150 mg total) by mouth daily.  Dispense: 90 tablet; Refill: 3  - OLANZapine (ZYPREXA) 5 MG tablet; Take 1 tablet (5 mg total) by mouth daily.  Dispense: 90 tablet; Refill: 0  - FLUoxetine (PROZAC) 20 MG capsule; Take 1 capsule (20 mg total) by mouth daily.  Dispense: 90 capsule; Refill: 3    2. Idiopathic chronic gout without tophus, unspecified site  - allopurinoL (ZYLOPRIM) 100 MG tablet; Take 1 tablet (100 mg total) by mouth daily.  Dispense: 90 tablet; Refill: 3    3. Elevated triglycerides with high cholesterol  - atorvastatin (LIPITOR) 20 MG tablet; Take 1 tablet (20 mg total) by mouth at bedtime.  Dispense: 90 tablet; Refill: 3    4. SVT (supraventricular tachycardia) (H)  - diltiazem (CARDIZEM CD) 180 MG 24 hr capsule; Take 1 capsule (180 mg total) by mouth daily.  Dispense: 90 capsule; Refill: 3    5. Benign essential hypertension  - diltiazem (CARDIZEM CD) 180 MG 24 hr capsule; Take 1 capsule (180 mg total) by mouth daily.  Dispense: 90 capsule; Refill: 3  - losartan (COZAAR) 25 MG tablet; Take 1 tablet (25 mg total) by mouth daily.  Dispense: 90 tablet; Refill: 3    If protocol passes may refill for 12 months if within 3 months of last provider visit (or a total of 15 months).

## 2021-06-13 NOTE — PROGRESS NOTES
Assessment/Plan:    1. Type 2 diabetes mellitus without complication, without long-term current use of insulin (H)  Hx DM2, diet controlled - last A1c 6.3%, will recheck A1c today.   - Glycosylated Hemoglobin A1c    2. Elevated transaminase level  3. Hepatic steatosis  Hx elevated transaminase levels in setting of hepatic steatosis - will recheck LFTs today, if continuing to increase then would consider referral to [facility].  - Comprehensive Metabolic Panel    4. Benign essential hypertension  Hx HTN, BP controlled today, no change to regimen, will check BMP today.  - Comprehensive Metabolic Panel    5. Hx of gout  Hx gout, on allopurinol, will recheck uric acid level today  - Uric Acid    6. Recurrent major depressive disorder, in full remission (H)  Doing well on current regimen, no changes needed today.    7. Skin lesion  R temporal raised skin lesion, given location recommend referral to dermatology for monitoring vs removal.  - Ambulatory referral to Dermatology      Follow up: 6 months for physical. Sooner as needed    Shira Contreras MD  [facility]    Subjective:    Patient ID: Christopher Lindsey is a 70 y.o. male is here today for med ck, skin concern    Med ck  -no concerns or side effects  -got refills recently  -BP nml  -no chest pain, shortness of breath, palpitations, leg swelling   -mood doing well  -due for some labs today    Lump on face  -R temporal region  -growing over past year  -nontender  -no bleeding or ulcerations  -no discharge      Patient Active Problem List   Diagnosis     Elevated triglycerides with high cholesterol     Benign essential hypertension     Other emphysema (H)     Recurrent major depressive disorder, in full remission (H)     SVT (supraventricular tachycardia) (H)     TERESA (obstructive sleep apnea)     Bilateral primary osteoarthritis of knee     Obesity (BMI 35.0-39.9) with comorbidity (H)     Obesity hypoventilation syndrome (H)     Restrictive lung disease      Hearing problem of both ears     Multiple thyroid nodules     Abnormal liver ultrasound     Hx of colonic polyps     Hx of gout     Type 2 diabetes mellitus without complication, without long-term current use of insulin (H)     Past Medical History:   Diagnosis Date     Benign essential hypertension 12/4/2018     Elevated triglycerides with high cholesterol 12/4/2018     TERESA (obstructive sleep apnea) 12/4/2018    Has BIPAP machine     Other emphysema (H) 12/4/2018    Hx following with pulmonology, [facility]     Patellofemoral disorder of both knees 12/4/2018     Prediabetes 12/4/2018     Recurrent major depressive disorder, in full remission (H) 12/4/2018     SVT (supraventricular tachycardia) (H) 12/4/2018    Hx 2 episodes in past year, now on diltiazem for this     Past Surgical History:   Procedure Laterality Date     HEMORROIDECTOMY  2004     INCISION AND DRAINAGE PERIRECTAL ABSCESS       Current Outpatient Medications on File Prior to Visit   Medication Sig Dispense Refill     allopurinoL (ZYLOPRIM) 100 MG tablet Take 1 tablet (100 mg total) by mouth daily. 90 tablet 1     atorvastatin (LIPITOR) 20 MG tablet Take 1 tablet (20 mg total) by mouth at bedtime. 90 tablet 1     buPROPion (WELLBUTRIN XL) 150 MG 24 hr tablet Take 1 tablet (150 mg total) by mouth daily. 90 tablet 1     diltiazem (CARDIZEM CD) 180 MG 24 hr capsule Take 1 capsule (180 mg total) by mouth daily. 90 capsule 1     fenofibric acid, choline, 45 mg capsule TAKE 1 CAPSULE (45 MG TOTAL) BY MOUTH DAILY. 90 capsule 3     FLUoxetine (PROZAC) 20 MG capsule Take 1 capsule (20 mg total) by mouth daily. 90 capsule 1     losartan (COZAAR) 25 MG tablet Take 1 tablet (25 mg total) by mouth daily. 90 tablet 1     OLANZapine (ZYPREXA) 5 MG tablet Take 1 tablet (5 mg total) by mouth daily. 90 tablet 0     No current facility-administered medications on file prior to visit.      No Known Allergies  Social History     Socioeconomic History     Marital status:       Spouse name: Not on file     Number of children: Not on file     Years of education: Not on file     Highest education level: Not on file   Occupational History     Not on file   Social Needs     Financial resource strain: Not on file     Food insecurity     Worry: Not on file     Inability: Not on file     Transportation needs     Medical: Not on file     Non-medical: Not on file   Tobacco Use     Smoking status: Former Smoker     Packs/day: 1.00     Years: 40.00     Pack years: 40.00     Quit date:      Years since quittin.9     Smokeless tobacco: Never Used     Tobacco comment: still using e-cigs at lowest level, trying to quit   Substance and Sexual Activity     Alcohol use: Yes     Frequency: 4 or more times a week     Drinks per session: 5 or 6     Binge frequency: Monthly     Drug use: No     Sexual activity: Not on file   Lifestyle     Physical activity     Days per week: Not on file     Minutes per session: Not on file     Stress: Not on file   Relationships     Social connections     Talks on phone: Not on file     Gets together: Not on file     Attends [employer] service: Not on file     Active member of club or organization: Not on file     Attends meetings of clubs or organizations: Not on file     Relationship status: Not on file     Intimate partner violence     Fear of current or ex partner: Not on file     Emotionally abused: Not on file     Physically abused: Not on file     Forced sexual activity: Not on file   Other Topics Concern     Not on file   Social History Narrative     Not on file     Family History   Problem Relation Age of Onset     Pancreatic cancer Mother 62     Depression Mother      Cancer Father 88        gallbladder     Diabetes Sister      Depression Sister      No Medical Problems Brother      Heart failure Maternal Grandmother          early 70s     Stroke Maternal Grandfather          in 80s     Heart failure Paternal Grandmother          in 90s      Heart attack Paternal Grandfather         late 60s     Heart failure Paternal Grandfather      No Medical Problems Brother      Review of systems is as stated in HPI, and the remainder of system review is otherwise negative.    Objective:      /70   Pulse 76   Wt (!) 255 lb 7 oz (115.9 kg)   BMI 37.72 kg/m      General appearance: awake, NAD  HEENT: atraumatic, normocephalic, no scleral icterus or injection  CV: RRR, no murmurs  Lungs: CTAB, breathing comfortably on room air  Extremities: no LE edema bilaterally, moving all extremities  Skin: small (approx 4mm) raised lesion on R temporal region - brown/black in color, nontender  Neuro: alert, oriented x3, CNs grossly intact, no focal deficits appreciated  Psych: normal mood/affect/behavior, answering questions appropriately, linear thought process

## 2021-06-13 NOTE — TELEPHONE ENCOUNTER
RN cannot approve Refill Request    RN can NOT refill this medication med is not covered by policy/route to provider. Last office visit: 7/7/2020 Shira Contreras MD Last Physical: 3/12/2020 Last MTM visit: Visit date not found Last visit same specialty: 7/7/2020 Shira Contreras MD.  Next visit within 3 mo: Visit date not found  Next physical within 3 mo: Visit date not found      Kelly Camilo, [facility] Triage/Med Refill 12/15/2020    Requested Prescriptions   Pending Prescriptions Disp Refills     OLANZapine (ZYPREXA) 5 MG tablet 90 tablet 0     Sig: Take 1 tablet (5 mg total) by mouth daily.       There is no refill protocol information for this order      Signed Prescriptions Disp Refills    buPROPion (WELLBUTRIN XL) 150 MG 24 hr tablet 90 tablet 1     Sig: Take 1 tablet (150 mg total) by mouth daily.       Tricyclics/Misc Antidepressant/Antianxiety Meds Refill Protocol Passed - 12/14/2020  2:46 PM        Passed - PCP or prescribing provider visit in last year     Last office visit with prescriber/PCP: 7/7/2020 Shira Contreras MD OR same dept: 7/7/2020 Shira Contreras MD OR same specialty: 7/7/2020 Shira Contreras MD  Last physical: 3/12/2020 Last MTM visit: Visit date not found   Next visit within 3 mo: Visit date not found  Next physical within 3 mo: Visit date not found  Prescriber OR PCP: Shira Contreras MD  Last diagnosis associated with med order: 1. Recurrent major depressive disorder, in full remission (H)  - buPROPion (WELLBUTRIN XL) 150 MG 24 hr tablet; Take 1 tablet (150 mg total) by mouth daily.  Dispense: 90 tablet; Refill: 1  - OLANZapine (ZYPREXA) 5 MG tablet; Take 1 tablet (5 mg total) by mouth daily.  Dispense: 90 tablet; Refill: 0  - FLUoxetine (PROZAC) 20 MG capsule; Take 1 capsule (20 mg total) by mouth daily.  Dispense: 90 capsule; Refill: 1    2. Idiopathic chronic gout without tophus, unspecified site  - allopurinoL (ZYLOPRIM) 100 MG tablet; Take 1 tablet (100 mg total) by  mouth daily.  Dispense: 90 tablet; Refill: 1    3. Elevated triglycerides with high cholesterol  - atorvastatin (LIPITOR) 20 MG tablet; Take 1 tablet (20 mg total) by mouth at bedtime.  Dispense: 90 tablet; Refill: 1    4. SVT (supraventricular tachycardia) (H)  - diltiazem (CARDIZEM CD) 180 MG 24 hr capsule; Take 1 capsule (180 mg total) by mouth daily.  Dispense: 90 capsule; Refill: 1    5. Benign essential hypertension  - diltiazem (CARDIZEM CD) 180 MG 24 hr capsule; Take 1 capsule (180 mg total) by mouth daily.  Dispense: 90 capsule; Refill: 1  - losartan (COZAAR) 25 MG tablet; Take 1 tablet (25 mg total) by mouth daily.  Dispense: 90 tablet; Refill: 1    If protocol passes may refill for 12 months if within 3 months of last provider visit (or a total of 15 months).               allopurinoL (ZYLOPRIM) 100 MG tablet 90 tablet 1     Sig: Take 1 tablet (100 mg total) by mouth daily.       Allopurinol/Febuxostat Refill Protocol  Passed - 12/14/2020  2:46 PM        Passed - LFT or AST or ALT in last 12 months     Albumin   Date Value Ref Range Status   07/07/2020 3.6 3.5 - 5.0 g/dL Final     Bilirubin, Total   Date Value Ref Range Status   07/07/2020 0.4 0.0 - 1.0 mg/dL Final     Bilirubin, Direct   Date Value Ref Range Status   07/07/2020 0.2 <=0.5 mg/dL Final     Alkaline Phosphatase   Date Value Ref Range Status   07/07/2020 140 (H) 45 - 120 U/L Final     AST   Date Value Ref Range Status   07/07/2020 96 (H) 0 - 40 U/L Final     ALT   Date Value Ref Range Status   07/07/2020 79 (H) 0 - 45 U/L Final     Protein, Total   Date Value Ref Range Status   07/07/2020 7.3 6.0 - 8.0 g/dL Final                Passed - Visit with PCP or prescribing provider visit in past 12 months     Last office visit with prescriber/PCP: 7/7/2020 Shira Contreras MD OR same dept: 7/7/2020 Shira Contreras MD OR same specialty: 7/7/2020 Shira Contreras MD  Last physical: 3/12/2020 Last MTM visit: Visit date not found   Next visit within  3 mo: Visit date not found  Next physical within 3 mo: Visit date not found  Prescriber OR PCP: Shira Contreras MD  Last diagnosis associated with med order: 1. Recurrent major depressive disorder, in full remission (H)  - buPROPion (WELLBUTRIN XL) 150 MG 24 hr tablet; Take 1 tablet (150 mg total) by mouth daily.  Dispense: 90 tablet; Refill: 1  - OLANZapine (ZYPREXA) 5 MG tablet; Take 1 tablet (5 mg total) by mouth daily.  Dispense: 90 tablet; Refill: 0  - FLUoxetine (PROZAC) 20 MG capsule; Take 1 capsule (20 mg total) by mouth daily.  Dispense: 90 capsule; Refill: 1    2. Idiopathic chronic gout without tophus, unspecified site  - allopurinoL (ZYLOPRIM) 100 MG tablet; Take 1 tablet (100 mg total) by mouth daily.  Dispense: 90 tablet; Refill: 1    3. Elevated triglycerides with high cholesterol  - atorvastatin (LIPITOR) 20 MG tablet; Take 1 tablet (20 mg total) by mouth at bedtime.  Dispense: 90 tablet; Refill: 1    4. SVT (supraventricular tachycardia) (H)  - diltiazem (CARDIZEM CD) 180 MG 24 hr capsule; Take 1 capsule (180 mg total) by mouth daily.  Dispense: 90 capsule; Refill: 1    5. Benign essential hypertension  - diltiazem (CARDIZEM CD) 180 MG 24 hr capsule; Take 1 capsule (180 mg total) by mouth daily.  Dispense: 90 capsule; Refill: 1  - losartan (COZAAR) 25 MG tablet; Take 1 tablet (25 mg total) by mouth daily.  Dispense: 90 tablet; Refill: 1    If protocol passes may refill for 12 months if within 3 months of last provider visit (or a total of 15 months).               atorvastatin (LIPITOR) 20 MG tablet 90 tablet 1     Sig: Take 1 tablet (20 mg total) by mouth at bedtime.       Statins Refill Protocol (Hmg CoA Reductase Inhibitors) Passed - 12/14/2020  2:46 PM        Passed - PCP or prescribing provider visit in past 12 months      Last office visit with prescriber/PCP: 7/7/2020 Shira Contreras MD OR same dept: 7/7/2020 Shira Contreras MD OR same specialty: 7/7/2020 Shira Contreras MD  Last  physical: 3/12/2020 Last MTM visit: Visit date not found   Next visit within 3 mo: Visit date not found  Next physical within 3 mo: Visit date not found  Prescriber OR PCP: Shira Contreras MD  Last diagnosis associated with med order: 1. Recurrent major depressive disorder, in full remission (H)  - buPROPion (WELLBUTRIN XL) 150 MG 24 hr tablet; Take 1 tablet (150 mg total) by mouth daily.  Dispense: 90 tablet; Refill: 1  - OLANZapine (ZYPREXA) 5 MG tablet; Take 1 tablet (5 mg total) by mouth daily.  Dispense: 90 tablet; Refill: 0  - FLUoxetine (PROZAC) 20 MG capsule; Take 1 capsule (20 mg total) by mouth daily.  Dispense: 90 capsule; Refill: 1    2. Idiopathic chronic gout without tophus, unspecified site  - allopurinoL (ZYLOPRIM) 100 MG tablet; Take 1 tablet (100 mg total) by mouth daily.  Dispense: 90 tablet; Refill: 1    3. Elevated triglycerides with high cholesterol  - atorvastatin (LIPITOR) 20 MG tablet; Take 1 tablet (20 mg total) by mouth at bedtime.  Dispense: 90 tablet; Refill: 1    4. SVT (supraventricular tachycardia) (H)  - diltiazem (CARDIZEM CD) 180 MG 24 hr capsule; Take 1 capsule (180 mg total) by mouth daily.  Dispense: 90 capsule; Refill: 1    5. Benign essential hypertension  - diltiazem (CARDIZEM CD) 180 MG 24 hr capsule; Take 1 capsule (180 mg total) by mouth daily.  Dispense: 90 capsule; Refill: 1  - losartan (COZAAR) 25 MG tablet; Take 1 tablet (25 mg total) by mouth daily.  Dispense: 90 tablet; Refill: 1    If protocol passes may refill for 12 months if within 3 months of last provider visit (or a total of 15 months).               diltiazem (CARDIZEM CD) 180 MG 24 hr capsule 90 capsule 1     Sig: Take 1 capsule (180 mg total) by mouth daily.       Calcium-Channel Blockers Protocol Passed - 12/14/2020  2:46 PM        Passed - PCP or prescribing provider visit in past 12 months or next 3 months     Last office visit with prescriber/PCP: 7/7/2020 Shria Contreras MD OR same dept: 7/7/2020  Shira Contreras MD OR same specialty: 7/7/2020 Shira Contreras MD  Last physical: 3/12/2020 Last [facility] visit: Visit date not found   Next visit within 3 mo: Visit date not found  Next physical within 3 mo: Visit date not found  Prescriber OR PCP: Shira Contreras MD  Last diagnosis associated with med order: 1. Recurrent major depressive disorder, in full remission (H)  - buPROPion (WELLBUTRIN XL) 150 MG 24 hr tablet; Take 1 tablet (150 mg total) by mouth daily.  Dispense: 90 tablet; Refill: 1  - OLANZapine (ZYPREXA) 5 MG tablet; Take 1 tablet (5 mg total) by mouth daily.  Dispense: 90 tablet; Refill: 0  - FLUoxetine (PROZAC) 20 MG capsule; Take 1 capsule (20 mg total) by mouth daily.  Dispense: 90 capsule; Refill: 1    2. Idiopathic chronic gout without tophus, unspecified site  - allopurinoL (ZYLOPRIM) 100 MG tablet; Take 1 tablet (100 mg total) by mouth daily.  Dispense: 90 tablet; Refill: 1    3. Elevated triglycerides with high cholesterol  - atorvastatin (LIPITOR) 20 MG tablet; Take 1 tablet (20 mg total) by mouth at bedtime.  Dispense: 90 tablet; Refill: 1    4. SVT (supraventricular tachycardia) (H)  - diltiazem (CARDIZEM CD) 180 MG 24 hr capsule; Take 1 capsule (180 mg total) by mouth daily.  Dispense: 90 capsule; Refill: 1    5. Benign essential hypertension  - diltiazem (CARDIZEM CD) 180 MG 24 hr capsule; Take 1 capsule (180 mg total) by mouth daily.  Dispense: 90 capsule; Refill: 1  - losartan (COZAAR) 25 MG tablet; Take 1 tablet (25 mg total) by mouth daily.  Dispense: 90 tablet; Refill: 1    If protocol passes may refill for 12 months if within 3 months of last provider visit (or a total of 15 months).             Passed - Blood pressure filed in past 12 months     BP Readings from Last 1 Encounters:   07/07/20 138/70               losartan (COZAAR) 25 MG tablet 90 tablet 1     Sig: Take 1 tablet (25 mg total) by mouth daily.       Angiotensin Receptor Blocker Protocol Passed - 12/14/2020  2:46 PM         Passed - PCP or prescribing provider visit in past 12 months       Last office visit with prescriber/PCP: 7/7/2020 Shira Contreras MD OR same dept: 7/7/2020 Shira Contreras MD OR same specialty: 7/7/2020 Shira Contreras MD  Last physical: 3/12/2020 Last MTM visit: Visit date not found   Next visit within 3 mo: Visit date not found  Next physical within 3 mo: Visit date not found  Prescriber OR PCP: Shira Contreras MD  Last diagnosis associated with med order: 1. Recurrent major depressive disorder, in full remission (H)  - buPROPion (WELLBUTRIN XL) 150 MG 24 hr tablet; Take 1 tablet (150 mg total) by mouth daily.  Dispense: 90 tablet; Refill: 1  - OLANZapine (ZYPREXA) 5 MG tablet; Take 1 tablet (5 mg total) by mouth daily.  Dispense: 90 tablet; Refill: 0  - FLUoxetine (PROZAC) 20 MG capsule; Take 1 capsule (20 mg total) by mouth daily.  Dispense: 90 capsule; Refill: 1    2. Idiopathic chronic gout without tophus, unspecified site  - allopurinoL (ZYLOPRIM) 100 MG tablet; Take 1 tablet (100 mg total) by mouth daily.  Dispense: 90 tablet; Refill: 1    3. Elevated triglycerides with high cholesterol  - atorvastatin (LIPITOR) 20 MG tablet; Take 1 tablet (20 mg total) by mouth at bedtime.  Dispense: 90 tablet; Refill: 1    4. SVT (supraventricular tachycardia) (H)  - diltiazem (CARDIZEM CD) 180 MG 24 hr capsule; Take 1 capsule (180 mg total) by mouth daily.  Dispense: 90 capsule; Refill: 1    5. Benign essential hypertension  - diltiazem (CARDIZEM CD) 180 MG 24 hr capsule; Take 1 capsule (180 mg total) by mouth daily.  Dispense: 90 capsule; Refill: 1  - losartan (COZAAR) 25 MG tablet; Take 1 tablet (25 mg total) by mouth daily.  Dispense: 90 tablet; Refill: 1    If protocol passes may refill for 12 months if within 3 months of last provider visit (or a total of 15 months).             Passed - Serum potassium within the past 12 months     Lab Results   Component Value Date    Potassium 4.4 03/12/2020              Passed - Blood pressure filed in past 12 months     BP Readings from Last 1 Encounters:   07/07/20 138/70             Passed - Serum creatinine within the past 12 months     Creatinine   Date Value Ref Range Status   03/12/2020 0.76 0.70 - 1.30 mg/dL Final               FLUoxetine (PROZAC) 20 MG capsule 90 capsule 1     Sig: Take 1 capsule (20 mg total) by mouth daily.       SSRI Refill Protocol  Passed - 12/14/2020  2:46 PM        Passed - PCP or prescribing provider visit in last year     Last office visit with prescriber/PCP: 7/7/2020 Shira Contreras MD OR same dept: 7/7/2020 Shira Contreras MD OR same specialty: 7/7/2020 Shira Contreras MD  Last physical: 3/12/2020 Last MTM visit: Visit date not found   Next visit within 3 mo: Visit date not found  Next physical within 3 mo: Visit date not found  Prescriber OR PCP: Shira Contreras MD  Last diagnosis associated with med order: 1. Recurrent major depressive disorder, in full remission (H)  - buPROPion (WELLBUTRIN XL) 150 MG 24 hr tablet; Take 1 tablet (150 mg total) by mouth daily.  Dispense: 90 tablet; Refill: 1  - OLANZapine (ZYPREXA) 5 MG tablet; Take 1 tablet (5 mg total) by mouth daily.  Dispense: 90 tablet; Refill: 0  - FLUoxetine (PROZAC) 20 MG capsule; Take 1 capsule (20 mg total) by mouth daily.  Dispense: 90 capsule; Refill: 1    2. Idiopathic chronic gout without tophus, unspecified site  - allopurinoL (ZYLOPRIM) 100 MG tablet; Take 1 tablet (100 mg total) by mouth daily.  Dispense: 90 tablet; Refill: 1    3. Elevated triglycerides with high cholesterol  - atorvastatin (LIPITOR) 20 MG tablet; Take 1 tablet (20 mg total) by mouth at bedtime.  Dispense: 90 tablet; Refill: 1    4. SVT (supraventricular tachycardia) (H)  - diltiazem (CARDIZEM CD) 180 MG 24 hr capsule; Take 1 capsule (180 mg total) by mouth daily.  Dispense: 90 capsule; Refill: 1    5. Benign essential hypertension  - diltiazem (CARDIZEM CD) 180 MG 24 hr capsule; Take 1 capsule (180  mg total) by mouth daily.  Dispense: 90 capsule; Refill: 1  - losartan (COZAAR) 25 MG tablet; Take 1 tablet (25 mg total) by mouth daily.  Dispense: 90 tablet; Refill: 1    If protocol passes may refill for 12 months if within 3 months of last provider visit (or a total of 15 months).

## 2021-06-14 NOTE — TELEPHONE ENCOUNTER
RN cannot approve Refill Request    RN can NOT refill this medication med is not covered by policy/route to provider. Last office visit: 12/22/2020 Shira Contreras MD Last Physical: 3/12/2020 Last MTM visit: Visit date not found Last visit same specialty: 12/22/2020 Shira Contreras MD.  Next visit within 3 mo: Visit date not found  Next physical within 3 mo: Visit date not found      Zoe Og, [facility] Triage/Med Refill 2/3/2021    Requested Prescriptions   Pending Prescriptions Disp Refills     fenofibric acid, choline, 45 mg capsule [Pharmacy Med Name: FENOFIBRIC ACID DR 45 MG CAP] 90 capsule 3     Sig: TAKE 1 CAPSULE (45 MG TOTAL) BY MOUTH DAILY.       There is no refill protocol information for this order

## 2021-06-15 NOTE — TELEPHONE ENCOUNTER
Reason contacted:  Medication problem  Information relayed:  Patient is calling because he received notification from his prescription drug coverage fenofibric acid is not covered.     Would you recommend fenofibrate instead?   Could we try sending a prescription to the pharmacy to see if this would be covered for the patient?     Patient uses the [facility] pharmacy in [place] on [address] [facility].   Additional questions:  No  Further follow-up needed:  Yes  Okay to leave a detailed message:  Yes

## 2021-06-15 NOTE — PROGRESS NOTES
Assessment:     1. Recurrent major depressive disorder, in full remission (H)  OLANZapine (ZYPREXA) 5 MG tablet       Plan:     1. Recurrent major depressive disorder, in full remission (H)  Patient is doing well with his recurrent depression he also is on fluoxetine but needs a refill of his Zyprexa currently also takes Wellbutrin.  - OLANZapine (ZYPREXA) 5 MG tablet; Take 1 tablet (5 mg total) by mouth daily.  Dispense: 90 tablet; Refill: 3      Subjective:   Patient needs a refill on one of his psychiatric medicines specifically olanzapine he is done very well up in 7 or 8 years since he has had a major depressive episode he is very stable we did review his PHQ-9 status.  We also are aware he is a type II diabetic his A1c is controlled with diet he is very active walks his dog every day he is retired lives with his second wife and everything seems to be well reviewed all of his chart answered all of his medical questions.  He will see Dr. Contreras for follow-up in 6 months very pleasant visit and I enjoyed discussing how much he enjoys his Pup whose name is Nella    Review of Systems: A complete 14 point review of systems was obtained and is negative or as stated in the history of present illness.    Past Medical History:   Diagnosis Date     Benign essential hypertension 12/4/2018     Elevated triglycerides with high cholesterol 12/4/2018     TERESA (obstructive sleep apnea) 12/4/2018    Has BIPAP machine     Other emphysema (H) 12/4/2018    Hx following with pulmonology, [facility]     Patellofemoral disorder of both knees 12/4/2018     Prediabetes 12/4/2018     Recurrent major depressive disorder, in full remission (H) 12/4/2018     SVT (supraventricular tachycardia) (H) 12/4/2018    Hx 2 episodes in past year, now on diltiazem for this     Family History   Problem Relation Age of Onset     Pancreatic cancer Mother 62     Depression Mother      Cancer Father 88        gallbladder     Diabetes Sister      Depression  Sister      No Medical Problems Brother      Heart failure Maternal Grandmother          early 70s     Stroke Maternal Grandfather          in 80s     Heart failure Paternal Grandmother          in 90s     Heart attack Paternal Grandfather         late 60s     Heart failure Paternal Grandfather      No Medical Problems Brother      Past Surgical History:   Procedure Laterality Date     HEMORROIDECTOMY  2004     INCISION AND DRAINAGE PERIRECTAL ABSCESS       Social History     Tobacco Use     Smoking status: Former Smoker     Packs/day: 1.00     Years: 40.00     Pack years: 40.00     Quit date:      Years since quittin.2     Smokeless tobacco: Never Used     Tobacco comment: still using e-cigs at lowest level, trying to quit   Substance Use Topics     Alcohol use: Yes     Frequency: 4 or more times a week     Drinks per session: 5 or 6     Binge frequency: Monthly     Drug use: No         Objective:   /84   Pulse 82   Wt (!) 259 lb 8 oz (117.7 kg)   SpO2 94%   BMI 38.32 kg/m      General Appearance:  Normal  Head:  Normal  Ears: Normal  Eyes:  Normal  Nose:  Normal  Throat:  Normal  Neck:  Normal  Back:  Normal  Chest/Breast:Normal  Lungs:  Normal  Heart:  Normal  Abdomen:  Normal  Musculoskeletal:  Normal  Lymphatic:  Normal  Skin/Hair/Nails:  Normal  Neurologic:  Normal  Extremities:  Normal  Genitourinary: Normal  Pulses:  Normal     Advance care directive reviewed and discussed      This note has been dictated using voice recognition software. Any grammatical or context distortions are unintentional and inherent to the the software.

## 2021-06-15 NOTE — TELEPHONE ENCOUNTER
RN cannot approve Refill Request    RN can NOT refill this medication med is not covered by policy/route to provider. Last office visit: 12/22/2020 Shira Contreras MD Last Physical: 3/12/2020 Last MTM visit: Visit date not found Last visit same specialty: 12/22/2020 Shira Contreras MD.  Next visit within 3 mo: Visit date not found  Next physical within 3 mo: Visit date not found      Fani Arenas, [facility] Triage/Med Refill 3/10/2021    Requested Prescriptions   Pending Prescriptions Disp Refills     OLANZapine (ZYPREXA) 5 MG tablet [Pharmacy Med Name: OLANZAPINE 5 MG TABLET] 90 tablet 0     Sig: TAKE 1 TABLET BY MOUTH EVERY DAY       There is no refill protocol information for this order

## 2021-06-16 ENCOUNTER — COMMUNICATION - HEALTHEAST (OUTPATIENT)
Dept: FAMILY MEDICINE | Facility: CLINIC | Age: 71
End: 2021-06-16

## 2021-06-16 DIAGNOSIS — I10 BENIGN ESSENTIAL HYPERTENSION: ICD-10-CM

## 2021-06-16 PROBLEM — J43.8 OTHER EMPHYSEMA (H): Status: ACTIVE | Noted: 2018-12-04

## 2021-06-16 PROBLEM — M17.0 BILATERAL PRIMARY OSTEOARTHRITIS OF KNEE: Status: ACTIVE | Noted: 2018-12-04

## 2021-06-16 PROBLEM — E11.9 TYPE 2 DIABETES MELLITUS WITHOUT COMPLICATION, WITHOUT LONG-TERM CURRENT USE OF INSULIN (H): Status: ACTIVE | Noted: 2020-07-07

## 2021-06-16 PROBLEM — E66.2 OBESITY HYPOVENTILATION SYNDROME (H): Status: ACTIVE | Noted: 2019-01-08

## 2021-06-16 PROBLEM — H91.93 HEARING PROBLEM OF BOTH EARS: Status: ACTIVE | Noted: 2019-01-08

## 2021-06-16 PROBLEM — I47.10 SVT (SUPRAVENTRICULAR TACHYCARDIA) (H): Status: ACTIVE | Noted: 2018-12-04

## 2021-06-16 PROBLEM — E78.2 ELEVATED TRIGLYCERIDES WITH HIGH CHOLESTEROL: Status: ACTIVE | Noted: 2018-12-04

## 2021-06-16 PROBLEM — E04.2 MULTIPLE THYROID NODULES: Status: ACTIVE | Noted: 2019-01-08

## 2021-06-16 PROBLEM — J98.4 RESTRICTIVE LUNG DISEASE: Status: ACTIVE | Noted: 2019-01-08

## 2021-06-16 PROBLEM — Z86.0100 HX OF COLONIC POLYPS: Status: ACTIVE | Noted: 2019-01-08

## 2021-06-16 PROBLEM — G47.33 OSA (OBSTRUCTIVE SLEEP APNEA): Status: ACTIVE | Noted: 2018-12-04

## 2021-06-16 PROBLEM — R93.2 ABNORMAL LIVER ULTRASOUND: Status: ACTIVE | Noted: 2019-01-08

## 2021-06-16 PROBLEM — Z87.39 HX OF GOUT: Status: ACTIVE | Noted: 2019-01-10

## 2021-06-16 PROBLEM — E66.01 MORBID OBESITY (H): Status: ACTIVE | Noted: 2018-12-04

## 2021-06-16 PROBLEM — F33.42 RECURRENT MAJOR DEPRESSIVE DISORDER, IN FULL REMISSION (H): Status: ACTIVE | Noted: 2018-12-04

## 2021-06-17 RX ORDER — LOSARTAN POTASSIUM 50 MG/1
50 TABLET ORAL DAILY
Qty: 90 TABLET | Refills: 0 | Status: SHIPPED | OUTPATIENT
Start: 2021-06-17 | End: 2021-09-23

## 2021-06-17 NOTE — PROGRESS NOTES
Assessment/Plan:    1. Benign essential hypertension  Hx HTN, on diltiazem (hx SVT) and losartan; pt brought in home readings and most >140/90 - discussed options includin. Work on weight loss, dietary change, increase exercise and monitor or 2. Increase losartan dose - pt opts to increase losartan dose which is reasonable - will change from 25mg daily to 50mg daily. Recommend pt send [employer] message with BP readings in 1-2 weeks to see if change in dose is beneficial.   - losartan (COZAAR) 50 MG tablet; Take 1 tablet (50 mg total) by mouth daily.  Dispense: 30 tablet; Refill: 3    2. Type 2 diabetes mellitus without complication, without long-term current use of insulin (H)  Hx DM2, diet controlled. Will check labs as below.   - Microalbumin, Random Urine  - Glycosylated Hemoglobin A1c    3. Elevated triglycerides with high cholesterol  Hx HLD, on statin, will check levels today.  - Lipid Cascade FASTING    4. Recurrent major depressive disorder, in full remission (H)  Hx depression, stable to slight worsening lately per pt's report, PHQ9 and GAD7 scores as below. Will continue current regimen but pt will reach out of worsening mood so we can make adjustments.       Follow up: 1-2 weeks via [employer] with BP readings    Shira Contreras MD  [facility]    Subjective:    Patient ID: Christopher Lindsey is a 70 y.o. male is here today for BP check    BP check  -BP was 146/84 at last visit  -brought in documentation of home BPs (see below)  -currently taking diltiazem 180mg daily (hx SVT) and losartan 25mg daily  -no chest pain, shortness of breath, palpitations, leg swelling - overall feeling well  -walking daily, small amt salt per day  -mood: reports mild worsening lately but thinks doses are appropriate, PHQ9 score 4 today (1 for thoughts of self harm), GAD7 score 2 today    5/10 9am 156/94, 4:30 /76  11 11 /97, 11:30 /94, 4:30 /74  512 10 /95, 6 /91, 8:40 PM  155/89  5/13 10:30 /103, 11 /96, 12 /97, 2 /89, 8:30 /75 and 105/71  5/14 11 /93, 1:20 /100, 6 /80, 8:40 /84  5/15 11 /98, 2:30 /88, 5 /88  5/16 11 a.m. 156/93, 1 /87, 5 /82  5/17 11 a.m. 162/97, 2:30 /80  5/19 11 a.m. 154/90    Patient Active Problem List   Diagnosis     Elevated triglycerides with high cholesterol     Benign essential hypertension     Other emphysema (H)     Recurrent major depressive disorder, in full remission (H)     SVT (supraventricular tachycardia) (H)     TERESA (obstructive sleep apnea)     Bilateral primary osteoarthritis of knee     Obesity (BMI 35.0-39.9) with comorbidity (H)     Obesity hypoventilation syndrome (H)     Restrictive lung disease     Hearing problem of both ears     Multiple thyroid nodules     Abnormal liver ultrasound     Hx of colonic polyps     Hx of gout     Type 2 diabetes mellitus without complication, without long-term current use of insulin (H)     Past Medical History:   Diagnosis Date     Benign essential hypertension 12/4/2018     Elevated triglycerides with high cholesterol 12/4/2018     TERESA (obstructive sleep apnea) 12/4/2018    Has BIPAP machine     Other emphysema (H) 12/4/2018    Hx following with pulmonology, [facility]     Patellofemoral disorder of both knees 12/4/2018     Prediabetes 12/4/2018     Recurrent major depressive disorder, in full remission (H) 12/4/2018     SVT (supraventricular tachycardia) (H) 12/4/2018    Hx 2 episodes in past year, now on diltiazem for this     Past Surgical History:   Procedure Laterality Date     HEMORROIDECTOMY  2004     INCISION AND DRAINAGE PERIRECTAL ABSCESS       Current Outpatient Medications on File Prior to Visit   Medication Sig Dispense Refill     allopurinoL (ZYLOPRIM) 100 MG tablet Take 1 tablet (100 mg total) by mouth daily. 90 tablet 1     atorvastatin (LIPITOR) 20 MG tablet Take 1 tablet (20 mg total) by mouth at  bedtime. 90 tablet 1     buPROPion (WELLBUTRIN XL) 150 MG 24 hr tablet Take 1 tablet (150 mg total) by mouth daily. 90 tablet 1     diltiazem (CARDIZEM CD) 180 MG 24 hr capsule Take 1 capsule (180 mg total) by mouth daily. 90 capsule 1     fenofibrate (TRICOR) 48 MG tablet Take 1 tablet (48 mg total) by mouth daily. 90 tablet 3     FLUoxetine (PROZAC) 20 MG capsule Take 1 capsule (20 mg total) by mouth daily. 90 capsule 1     OLANZapine (ZYPREXA) 5 MG tablet Take 1 tablet (5 mg total) by mouth daily. 90 tablet 3     [DISCONTINUED] losartan (COZAAR) 25 MG tablet Take 1 tablet (25 mg total) by mouth daily. 90 tablet 1     No current facility-administered medications on file prior to visit.      No Known Allergies  Social History     Socioeconomic History     Marital status:      Spouse name: Not on file     Number of children: Not on file     Years of education: Not on file     Highest education level: Not on file   Occupational History     Not on file   Social Needs     Financial resource strain: Not on file     Food insecurity     Worry: Not on file     Inability: Not on file     Transportation needs     Medical: Not on file     Non-medical: Not on file   Tobacco Use     Smoking status: Former Smoker     Packs/day: 1.00     Years: 40.00     Pack years: 40.00     Quit date:      Years since quittin.3     Smokeless tobacco: Never Used     Tobacco comment: still using e-cigs at lowest level, trying to quit   Substance and Sexual Activity     Alcohol use: Yes     Frequency: 4 or more times a week     Drinks per session: 5 or 6     Binge frequency: Monthly     Drug use: No     Sexual activity: Not on file   Lifestyle     Physical activity     Days per week: Not on file     Minutes per session: Not on file     Stress: Not on file   Relationships     Social connections     Talks on phone: Not on file     Gets together: Not on file     Attends [employer] service: Not on file     Active member of club or  organization: Not on file     Attends meetings of clubs or organizations: Not on file     Relationship status: Not on file     Intimate partner violence     Fear of current or ex partner: Not on file     Emotionally abused: Not on file     Physically abused: Not on file     Forced sexual activity: Not on file   Other Topics Concern     Not on file   Social History Narrative     Not on file     Family History   Problem Relation Age of Onset     Pancreatic cancer Mother 62     Depression Mother      Cancer Father 88        gallbladder     Diabetes Sister      Depression Sister      No Medical Problems Brother      Heart failure Maternal Grandmother          early 70s     Stroke Maternal Grandfather          in 80s     Heart failure Paternal Grandmother          in 90s     Heart attack Paternal Grandfather         late 60s     Heart failure Paternal Grandfather      No Medical Problems Brother      Review of systems is as stated in HPI, and the remainder of system review is otherwise negative.    Objective:      /70   Pulse 88   Wt (!) 258 lb (117 kg)   BMI 38.10 kg/m      General appearance: awake, NAD, obese  HEENT: atraumatic, normocephalic, no scleral icterus or injection  CV: RRR, no murmurs/rubs/gallops, normal S1 and S2  Lungs: CTAB, no wheezes or crackles, breathing comfortably on room air  Extremities: no LE edema bilaterally, moving all extremities  Neuro: alert, oriented x3, CNs grossly intact, no focal deficits appreciated  Psych: normal mood/affect/behavior, answering questions appropriately, linear thought process

## 2021-06-17 NOTE — PATIENT INSTRUCTIONS - HE
Patient Instructions by Shira Contreras MD at 9/10/2019  8:00 AM     Author: Shira Contreras MD Service: -- Author Type: Physician    Filed: 9/10/2019  8:24 AM Encounter Date: 9/10/2019 Status: Addendum    : Shira Contreras MD (Physician)    Related Notes: Original Note by Shira Contreras MD (Physician) filed at 9/10/2019  8:20 AM       Patient Education     Understanding Patellofemoral Syndrome    Patellofemoral syndrome is a condition that causes pain on the front of the knee. The large bones of the upper and lower leg meet at the knee. This joint also includes a small triangle-shaped bone that rests on top of the leg bones. This is the kneecap (patella). Patellofemoral refers to the patella and the thigh bone (femur). These bones are surrounded by connective tissue and muscles. Patellofemoral pain is believed to come from stress on the tissues of and around the knee.  What causes patellofemoral syndrome?  No single cause for patellofemoral pain has been found. But many things are likely to contribute to this type of knee pain. These include:    Actions that put repeated stress on the knee, such as running and squatting    Overtraining at a sport    Weak hip or thigh muscle    Normal variations in the way body parts fit together    Poor form during activities that stress the knee, such as running    A fall or blow to the knee  Symptoms of patellofemoral syndrome  Pain is a common symptom. Its often on the front of the knee, but can be around the kneecap. Pain can occur at certain times, such as when you are:    Running    Sitting for a long time with your knees bent, such as at a movie    Walking up or down stairs    Squatting  Other symptoms may include:    A feeling of the knee catching or locking    A grinding or crackling noise in your knee  Treatment for patellofemoral syndrome  Treatment focuses on reducing pain and avoiding further injury. Treatments may include:    Rest your leg. This gives  your knee time to recover. You may need to avoid or change the activity that caused the problem, such as not running for a while.    Prescription or over-the-counter pain medicines. These help reduce inflammation, swelling, and pain.    Cold packs. These help reduce pain.    Stretches and other exercises. These can improve balance, flexibility, and strength.    A shoe insert (orthotic). This can make your knee more stable.    Elastic tape or a brace. These can make your knee more stable.    Physical therapy. This may include exercises or other treatments.    Surgery. In rare cases, if other treatments dont relieve symptoms, you may need surgery.  Possible complications of patellofemoral syndrome  If you dont give your knee time to heal, symptoms may return or get worse. Follow your healthcare providers instructions on resting and treating your knee.  When to call your healthcare provider  Call your healthcare provider right away if you have any of these:    Fever of 100.4 F (38 C) or higher, or as directed    Pain that gets worse    Symptoms that dont get better, or get worse    New symptoms   Date Last Reviewed: 3/10/2016    5029-7524 [employer]. [address] [place] [address]. All rights reserved. This information is not intended as a substitute for professional medical care. Always follow your healthcare professional's instructions.    Patient Education     Osteoarthritis (wear and tear arthritis)  Osteoarthritis (also called degenerative joint disease) happens when the cartilage in a joint becomes damaged and worn. This may be due to age, wear and tear, overuse of the joint, or other problems. Osteoarthritis can affect any joint. But it is most common in hands, knees, spine, hips, and feet. Symptoms include joint stiffness, pain, and swelling.  Home care    When a joint is more sore than usual, rest it for a day or two.    Heat can help relieve stiffness. Take a hot bath or apply a  heating pad for up to 30 minutes at a time. If symptoms are worse in the morning, using heat just after awakening can help relax the muscle and soothe the joints.     Ice helps relieve pain and swelling. It is often used after activity. Use a cold pack wrapped in a thin cloth on the joint for 10 to 15 minutes at a time.     Alternating hot and cold can also help relieve pain. Try this for 20 minutes at a time, several times per day.    Exercise helps prevent the muscles and ligaments around the joint from becoming weak. It also helps maintain function in the joint.  Be as active as you can. Talk to your healthcare provider about what activity program is best for you.    Excess weight puts a lot of extra strain on weight-bearing joints of the lower back, hips, knees, feet and ankles. If you are overweight, talk to your healthcare provider about a safe and effective weight loss program.    Use anti-inflammatory medicines as prescribed for pain. This includes acetaminophen or NSAIDs such as ibuprofen or naproxen. If needed, topical or injected medicines may be recommended. Talk to your healthcare provider if these options are not enough to manage your pain.    Talk with your healthcare provider about devices that might help improve your function and reduce pain.  Follow-up care  Follow up with your healthcare provider as advised by our staff.  When to seek medical advice  Call your healthcare provider right away if any of these occur:    Redness or swelling of a painful joint    Discharge or pus from a painful joint    Fever of 100.4 F (38 C) or higher, or as directed by your healthcare provider    Worsening joint pain    Decreased ability to move the joint or bear weight on the joint  Date Last Reviewed: 3/1/2017    3442-2292 [employer]. [address] [place] [address], [place] [address]. All rights reserved. This information is not intended as a substitute for professional medical care. Always follow your  healthcare professional's instructions.              will get phone call to schedule ultrasound  Blood test today - will call with result as well as Xray result of the knees

## 2021-06-19 NOTE — LETTER
"Letter by Shira Contreras MD at      Author: Shira Contreras MD Service: -- Author Type: --    Filed:  Encounter Date: 9/17/2019 Status: (Other)         [place] [address]     September 17, 2019     Dear Mr. Lindsey,  Below are the results from your recent visit:  Resulted Orders   US Abdomen Limited    Narrative    EXAM: US ABDOMEN LIMITED  LOCATION: [facility]  DATE/TIME: 9/17/2019 9:11 AM    INDICATION: hx abnormal liver appearance (hepatocellular changes vs fatty liver) and possible gallbladder polyps - needs f/u  COMPARISON: None.    FINDINGS:  GALLBLADDER: Normal, without cholelithiasis. No polyps are identified.  BILE DUCTS: No bile duct dilation. Common duct measures 3 mm.  LIVER: There is diffuse increased echogenicity in the liver consistent with hepatic steatosis. No focal hepatic abnormality.  RIGHT KIDNEY: No hydronephrosis.  PANCREAS: The visualized pancreas is normal.    No ascites in the right upper quadrant.      Impression    CONCLUSION:  1.  Normal sonographic appearance the gallbladder no stones or polyps identified.  2.  Diffuse hepatic steatosis without focal hepatic abnormality. No ascites.     Your ultrasound does not show any polyps in the gallbladder; it does show hepatic steatosis (also called \"fatty liver\") this occurs when fat infiltrates our liver - it has the potential to be dangerous over time - healthy diet, regular exercise and weight loss can help with this. Overall I am quite happy with this result. It is still reasonable to meet with the gastroenterologist specialist if you desire to discuss hepatic steatosis and liver blood test results - otherwise we could recheck the blood test in 3-6 months and then decide if referral is needed at that time; let me know your thoughts.    Please call with questions or contact us using [employer].  Sincerely,       Shira Contreras MD       "

## 2021-06-20 ENCOUNTER — HEALTH MAINTENANCE LETTER (OUTPATIENT)
Age: 71
End: 2021-06-20

## 2021-06-20 NOTE — LETTER
Letter by Shira Contreras MD at      Author: Shira Contreras MD Service: -- Author Type: --    Filed:  Encounter Date: 2/25/2020 Status: (Other)         Christopher Lindsey  [address] [facility] [address]      February 25, 2020      Dear Christopher Lindsey,    Per our refill protocol, you are due for a medication check office visit. Your prescription for Zyprexa  was sent to your pharmacy (02.25.2020). Please call [phone] to schedule a PHYSICAL visit with Dr Contreras in the next few weeks before your next refill is needed to avoid delays.    Thank you,  [facility]

## 2021-06-22 NOTE — PROGRESS NOTES
Assessment/Plan:    1. Encounter to establish care with new doctor  Pt moved here from [place]. Establishing care today with me at [facility].    2. Recurrent major depressive disorder, in full remission (H)  Pt reports stable mood at this time, last flare was 6 years ago; PHQ9 score 0 today. Refills provided on medications. Will continue to monitor.  - buPROPion (WELLBUTRIN XL) 150 MG 24 hr tablet; Take 1 tablet (150 mg total) by mouth daily.  Dispense: 90 tablet; Refill: 3  - FLUoxetine (PROZAC) 20 MG capsule; Take 1 capsule (20 mg total) by mouth daily.  Dispense: 90 capsule; Refill: 3    3. SVT (supraventricular tachycardia) (H)  Hx SVT, was seen by cardiology in [place] and started on diltiazem, sx controlled since then. Refill provided today.  - diltiazem (TIAZAC) 180 MG 24 hr capsule; Take 1 capsule (180 mg total) by mouth daily.  Dispense: 90 capsule; Refill: 3    4. Morbid obesity (H)  BMI 38.02 today. Discussed healthy diet and regular exercise for weight loss.    5. Elevated triglycerides with high cholesterol  Refills provided today, will plan recheck lipid panel in 3 months at physical.  - atorvastatin (LIPITOR) 20 MG tablet; Take 1 tablet (20 mg total) by mouth at bedtime.  Dispense: 90 tablet; Refill: 3  - fenofibric acid, choline, 45 mg capsule; Take 1 capsule (45 mg total) by mouth daily.  Dispense: 90 capsule; Refill: 3    6. Benign essential hypertension  Blood pressure within goal range today; will continue current regimen, refills provided today. Will plan recheck BMP in 3 months at physical.  - diltiazem (TIAZAC) 180 MG 24 hr capsule; Take 1 capsule (180 mg total) by mouth daily.  Dispense: 90 capsule; Refill: 3  - losartan (COZAAR) 25 MG tablet; Take 1 tablet (25 mg total) by mouth daily.  Dispense: 90 tablet; Refill: 3    7. Idiopathic chronic gout without tophus, unspecified site  Pt reports 2 prior episodes of gout. Requests refill of allopurinol, provided today. Once obtain records  will check to see when last uric acid level was drawn; would be reasonable to recheck level and consider stopping allopurinol given only 2 prior episodes of gout.  - allopurinol (ZYLOPRIM) 100 MG tablet; Take 1 tablet (100 mg total) by mouth daily.  Dispense: 90 tablet; Refill: 3      Follow up: 3 months for physical    Shira Contreras MD  [facility]    Subjective:    Patient ID: Christopher Lindsey is a 68 y.o. male is here today to establish care and get medication refills    Hx HTN, HLD, gout, SVT  -pt with hx HTN though blood pressure has been controlled for some time now  -currently taking losartan and diltiazem  -pt had been taking a different BP medication but was switched to diltiazem after SVT episodes  -pt reports 2 total SVT episodes, both of which required ER visits - no clear etiology, controlled now on diltiazem  -no current issues of chest pain, shortness of breath, palpitations, LE edema  -no headache or vision changes  -pt reports 2 episodes of gout in the past, has been on allopurinol for a long time now, doesn't remember when uric acid was last checked, no tophi noted  -needs refill of medications today    Hx depression  -hx depression, currently on wellbutrin and prozac  -no med side effects  -symptoms well controlled with current medications - needs refill today  -PHQ9 score 0 today  -no significant anxiety reported  -no thoughts of self harm      Patient Active Problem List   Diagnosis     Elevated triglycerides with high cholesterol     Benign essential hypertension     Other emphysema (H)     Prediabetes     Recurrent major depressive disorder, in full remission (H)     SVT (supraventricular tachycardia) (H)     TERESA (obstructive sleep apnea)     Patellofemoral disorder of both knees     Obesity (BMI 35.0-39.9) with comorbidity (H)     Past Medical History:   Diagnosis Date     Benign essential hypertension 12/4/2018     Elevated triglycerides with high cholesterol 12/4/2018      TERESA (obstructive sleep apnea) 12/4/2018    Has BIPAP machine     Other emphysema (H) 12/4/2018    Hx following with pulmonology, [facility]     Patellofemoral disorder of both knees 12/4/2018     Prediabetes 12/4/2018     Recurrent major depressive disorder, in full remission (H) 12/4/2018     SVT (supraventricular tachycardia) (H) 12/4/2018    Hx 2 episodes in past year, now on diltiazem for this     Past Surgical History:   Procedure Laterality Date     HEMORROIDECTOMY  2004     INCISION AND DRAINAGE PERIRECTAL ABSCESS       Current Outpatient Medications on File Prior to Visit   Medication Sig Dispense Refill     [DISCONTINUED] allopurinol (ZYLOPRIM) 100 MG tablet Take 100 mg by mouth daily.       [DISCONTINUED] atorvastatin (LIPITOR) 20 MG tablet Take 20 mg by mouth at bedtime.       [DISCONTINUED] buPROPion (WELLBUTRIN XL) 150 MG 24 hr tablet Take 150 mg by mouth daily.       [DISCONTINUED] diltiazem (TIAZAC) 180 MG 24 hr capsule Take 180 mg by mouth daily.       [DISCONTINUED] fenofibric acid, choline, 45 mg capsule Take 45 mg by mouth daily.       [DISCONTINUED] FLUoxetine (PROZAC) 20 MG capsule Take 20 mg by mouth daily.       [DISCONTINUED] losartan (COZAAR) 25 MG tablet Take 25 mg by mouth daily.       No current facility-administered medications on file prior to visit.      No Known Allergies  Social History     Socioeconomic History     Marital status:      Spouse name: Not on file     Number of children: Not on file     Years of education: Not on file     Highest education level: Not on file   Social Needs     Financial resource strain: Not on file     Food insecurity - worry: Not on file     Food insecurity - inability: Not on file     Transportation needs - medical: Not on file     Transportation needs - non-medical: Not on file   Occupational History     Not on file   Tobacco Use     Smoking status: Former Smoker     Packs/day: 1.00     Years: 40.00     Pack years: 40.00     Last attempt to  "quit: 2014     Years since quittin.9     Smokeless tobacco: Never Used     Tobacco comment: still using e-cigs at lowest level, trying to quit   Substance and Sexual Activity     Alcohol use: Yes     Frequency: 4 or more times a week     Drinks per session: 5 or 6     Binge frequency: Monthly     Drug use: No     Sexual activity: Not on file   Other Topics Concern     Not on file   Social History Narrative     Not on file     Review of systems is as stated in HPI, and the remainder of the 10 system review is otherwise negative.    Objective:      /80   Pulse 87   Ht 5' 9\" (1.753 m)   Wt (!) 257 lb 7 oz (116.8 kg)   SpO2 95%   BMI 38.02 kg/m      General appearance: awake, NAD  HEENT: atraumatic, normocephalic, PERRL, no scleral icterus or injection, moist mucous membranes  Neck: normal ROM  CV: RRR, no murmurs/rubs/gallops, normal S1 and S2  Lungs: CTAB, no wheezes or crackles, breathing comfortably on room air  Extremities: no LE edema bilaterally, moving all extremities, strong peripheral pulses bilaterally  Skin: no rashes or lesions  Neuro: alert, oriented x3, CNs grossly intact, no focal deficits appreciated  Psych: normal mood/affect/behavior, answering questions appropriately, linear thought process    "

## 2021-06-24 NOTE — TELEPHONE ENCOUNTER
Reason contacted:  results  Information relayed:  See message   Additional questions:  No  Further follow-up needed:  No  Okay to leave a detailed message:  No

## 2021-06-24 NOTE — PROGRESS NOTES
Assessment and Plan:   Christopher is a 68 year old male here for annual wellness visit    1. Routine general medical examination at a health care facility  For annual wellness visit today.  Will check labs as below.  Due for PCV 23 and TD, these were administered today.  Passed cognitive screening.  No help needed for ADLs.  No fall risk.  Patient has advanced directive at home that he will bring to clinic to be scanned into chart.  - Td, Preservative Free (green label)  - Lipid Sac FASTING  - Basic Metabolic Panel  - Uric Acid  - Pneumococcal polysaccharide vaccine 23-valent 1 yo or older, subq/IM    2. Elevated triglycerides with high cholesterol  History of elevated triglycerides and high cholesterol, currently taking atorvastatin 20 mg and fenofibric acid 45 mg.  Will recheck lipid panel today.  - Lipid Sac FASTING    3. Benign essential hypertension  History hypertension, currently taking losartan and diltiazem, also history SVT.  Will check BMP today.  - Basic Metabolic Panel    4. Hx of gout  Patient reports 2 gout episodes in the past, has been on allopurinol 100 mg daily for quite some time he states.  Per review of received records, uric acid level was 5.5 in 2017.  I question whether patient truly needs allopurinol, will check uric acid level at this time and if a reasonable level would consider stopping allopurinol.  - Uric Acid    5. Obesity (BMI 35.0-39.9) with comorbidity (H)  BMI 38.10 today.  Patient is currently working on increasing activity level but has not yet made dietary changes.  Encouraged healthy eating.  Also encouraged patient to decrease alcohol use as this is likely providing excessive calories.    6. Alcohol use  Patient is currently drinking 5 glasses of wine per day.  Discussed that this is more than is recommended for daily intake.  Encouraged decreasing to 2 or less per day.      The patient's current medical problems were reviewed.    I have had an Advance Directives  discussion with the patient.     The following health maintenance schedule was reviewed with the patient and provided in printed form in the after visit summary:   Health Maintenance   Topic Date Due     DEPRESSION FOLLOW UP  1950     TD 18+ HE  10/04/1969     ADVANCE DIRECTIVES DISCUSSED WITH PATIENT  10/04/1969     ZOSTER VACCINES (1 of 2) 10/04/2001     PNEUMOCOCCAL POLYSACCHARIDE VACCINE AGE 65 AND OVER  10/04/2016     FALL RISK ASSESSMENT  12/04/2019     COLONOSCOPY  01/11/2026     INFLUENZA VACCINE RULE BASED  Completed     PNEUMOCOCCAL CONJUGATE VACCINE FOR ADULTS (PCV13 OR PREVNAR)  Completed        Shira Contreras MD  [facility]    Subjective:   Chief Complaint: Christopher Lindsey is an 68 y.o. male here for an Annual Wellness visit.   HPI:  Feeling ok since our last visit. Has been exercising since last visit - Y 3x/week, hoping to increase this, uses sitting elliptical and weights. No particular diet changes right now but planning to focus on portion sizes. Drinking 5 alcoholic beverages per day.    Review of Systems:   Please see above.  The rest of the review of systems are negative for all systems.    Patient Care Team:  Shira Contreras MD as PCP - General (Family Medicine)     Patient Active Problem List   Diagnosis     Elevated triglycerides with high cholesterol     Benign essential hypertension     Other emphysema (H)     Prediabetes     Recurrent major depressive disorder, in full remission (H)     SVT (supraventricular tachycardia) (H)     TERESA (obstructive sleep apnea)     Bilateral primary osteoarthritis of knee     Obesity (BMI 35.0-39.9) with comorbidity (H)     Obesity hypoventilation syndrome (H)     Restrictive lung disease     Hearing problem of both ears     Multiple thyroid nodules     Abnormal liver ultrasound     Hx of colonic polyps     Hx of gout     Past Medical History:   Diagnosis Date     Benign essential hypertension 12/4/2018     Elevated triglycerides with high  cholesterol 2018     TERESA (obstructive sleep apnea) 2018    Has BIPAP machine     Other emphysema (H) 2018    Hx following with pulmonology, [facility]     Patellofemoral disorder of both knees 2018     Prediabetes 2018     Recurrent major depressive disorder, in full remission (H) 2018     SVT (supraventricular tachycardia) (H) 2018    Hx 2 episodes in past year, now on diltiazem for this      Past Surgical History:   Procedure Laterality Date     HEMORROIDECTOMY  2004     INCISION AND DRAINAGE PERIRECTAL ABSCESS        Family History   Problem Relation Age of Onset     Pancreatic cancer Mother 62     Depression Mother      Cancer Father 88        gallbladder     Diabetes Sister      Depression Sister      No Medical Problems Brother      Heart failure Maternal Grandmother          early 70s     Stroke Maternal Grandfather          in 80s     Heart failure Paternal Grandmother          in 90s     Heart attack Paternal Grandfather         late 60s     Heart failure Paternal Grandfather      No Medical Problems Brother       Social History     Socioeconomic History     Marital status:      Spouse name: Not on file     Number of children: Not on file     Years of education: Not on file     Highest education level: Not on file   Occupational History     Not on file   Social Needs     Financial resource strain: Not on file     Food insecurity:     Worry: Not on file     Inability: Not on file     Transportation needs:     Medical: Not on file     Non-medical: Not on file   Tobacco Use     Smoking status: Former Smoker     Packs/day: 1.00     Years: 40.00     Pack years: 40.00     Last attempt to quit:      Years since quittin.1     Smokeless tobacco: Never Used     Tobacco comment: still using e-cigs at lowest level, trying to quit   Substance and Sexual Activity     Alcohol use: Yes     Frequency: 4 or more times a week     Drinks per session: 5 or 6     Binge  "frequency: Monthly     Drug use: No     Sexual activity: Not on file   Lifestyle     Physical activity:     Days per week: Not on file     Minutes per session: Not on file     Stress: Not on file   Relationships     Social connections:     Talks on phone: Not on file     Gets together: Not on file     Attends [employer] service: Not on file     Active member of club or organization: Not on file     Attends meetings of clubs or organizations: Not on file     Relationship status: Not on file     Intimate partner violence:     Fear of current or ex partner: Not on file     Emotionally abused: Not on file     Physically abused: Not on file     Forced sexual activity: Not on file   Other Topics Concern     Not on file   Social History Narrative     Not on file      Current Outpatient Medications   Medication Sig Dispense Refill     allopurinol (ZYLOPRIM) 100 MG tablet Take 1 tablet (100 mg total) by mouth daily. 90 tablet 3     atorvastatin (LIPITOR) 20 MG tablet Take 1 tablet (20 mg total) by mouth at bedtime. 90 tablet 3     buPROPion (WELLBUTRIN XL) 150 MG 24 hr tablet Take 1 tablet (150 mg total) by mouth daily. 90 tablet 3     diltiazem (TIAZAC) 180 MG 24 hr capsule Take 1 capsule (180 mg total) by mouth daily. 90 capsule 3     fenofibric acid, choline, 45 mg capsule Take 1 capsule (45 mg total) by mouth daily. 90 capsule 3     FLUoxetine (PROZAC) 20 MG capsule Take 1 capsule (20 mg total) by mouth daily. 90 capsule 3     losartan (COZAAR) 25 MG tablet Take 1 tablet (25 mg total) by mouth daily. 90 tablet 3     OLANZapine (ZYPREXA) 5 MG tablet TAKE 1 TABLET BY MOUTH EVERY DAY 90 tablet 0     No current facility-administered medications for this visit.       Objective:   Vital Signs:   Visit Vitals  /80   Pulse 99   Ht 5' 9\" (1.753 m)   Wt (!) 258 lb (117 kg)   SpO2 95%   BMI 38.10 kg/m       PHYSICAL EXAM  General appearance: awake, NAD  HEENT: atraumatic, normocephalic, no scleral icterus or injection  CV: " RRR, no murmurs/rubs/gallops, normal S1 and S2  Lungs: CTAB, no wheezes or crackles, breathing comfortably on room air  Neuro: alert, oriented x3, CNs grossly intact, no focal deficits appreciated  Psych: normal mood/affect/behavior, answering questions appropriately, linear thought process      Assessment Results 3/7/2019   Activities of Daily Living No help needed   Instrumental Activities of Daily Living No help needed   Mini Cog Total Score 5   Some recent data might be hidden     A Mini-Cog score of 0-2 suggests the possibility of dementia, score of 3-5 suggests no dementia    Identified Health Risks:     The patient reports that he drinks more than one alcoholic drink per day and sometimes engages in binge or excessive drinking. He was counseled and given information about possible harmful effects of excessive alcohol intake as well as where to get help for alcohol problems.  Patient's advanced directive was discussed and I am comfortable with the patient's wishes.

## 2021-06-24 NOTE — PATIENT INSTRUCTIONS - HE
Patient Education   Alcohol Use   Many people can enjoy a glass of wine or beer without any negative consequences to their health. According to the Centers for Disease Control and Prevention (CDC), having one or fewer drinks per day for women and two or fewer per day for men is considered moderate drinking.     When people drink more than moderately, it can become concerning. Excessive drinking is defined as consuming 15 drinks or more per week for men and 8 drinks or more per week for women. There are various health problems associated with excessive drinking, which include:    Damage to vital organs like the heart, brain, liver and pancreas    Harm to the digestive tract    Weaken the immune system    Higher risk for heart disease and cancer  There are many resources available to people who are addicted to alcohol. A counselor or other health care provider can give you support. So can a , , or rabbi who is trained in substance abuse counseling. Friends and family may also help once you are connected with professionals.        Advance Directive  Patient s advance directive was discussed and I am comfortable with the patient s wishes. Bring your will/advance directive to clinic when able to be scanned into chart  Patient Education   Personalized Prevention Plan  You are due for the preventive services outlined below.  Your care team is available to assist you in scheduling these services.  If you have already completed any of these items, please share that information with your care team to update in your medical record.  Health Maintenance   Topic Date Due     DEPRESSION FOLLOW UP  1950     TD 18+ HE  10/04/1969     ADVANCE DIRECTIVES DISCUSSED WITH PATIENT  10/04/1969     ZOSTER VACCINES (1 of 2) 10/04/2001     PNEUMOCOCCAL POLYSACCHARIDE VACCINE AGE 65 AND OVER  10/04/2016     PNEUMOCOCCAL CONJUGATE VACCINE FOR ADULTS (PCV13 OR PREVNAR)  10/04/2016     FALL RISK ASSESSMENT  12/04/2019      COLONOSCOPY  01/11/2026     INFLUENZA VACCINE RULE BASED  Completed

## 2021-06-24 NOTE — TELEPHONE ENCOUNTER
RN cannot approve Refill Request    RN can NOT refill this medication med is not covered by policy/route to provider. Last office visit: 12/4/2018 Shira Contreras MD Last Physical: Visit date not found Last MTM visit: Visit date not found Last visit same specialty: 12/4/2018 Shira Contreras MD.  Next visit within 3 mo: Visit date not found  Next physical within 3 mo: Visit date not found      Ayleen Partida, [facility] Triage/Med Refill 3/2/2019    Requested Prescriptions   Pending Prescriptions Disp Refills     OLANZapine (ZYPREXA) 5 MG tablet [Pharmacy Med Name: OLANZAPINE 5 MG TABLET] 90 tablet 0     Sig: TAKE 1 TABLET BY MOUTH EVERY DAY    There is no refill protocol information for this order

## 2021-06-24 NOTE — TELEPHONE ENCOUNTER
----- Message from Shira Contreras MD sent at 3/8/2019  4:11 PM CST -----  Please call patient with test results.    Hi Christopher,    Blood test results have returned.  Your cholesterol levels are overall very normal -continue taking your atorvastatin.  Your electrolyte and kidney function are normal.  Your uric acid level was 5.9.  This tells me that if you are interested in trying to stop your allopurinol it would be reasonable; however there is still a relatively high chance that you could have another gout flare if you stop allopurinol.  Best way to prevent gout flares is to continue your allopurinol.  Overall I would recommend continuing the allopurinol, but if your desire is to try going off it I think that is okay as well.    Thanks,  Dr. Contreras

## 2021-06-24 NOTE — TELEPHONE ENCOUNTER
Reason contacted:  Test results within this encounter   Information relayed:  Dr Barnett notes below   Additional questions:  No  Further follow-up needed:  No  Okay to leave a detailed message:  Kavita Hooper

## 2021-06-25 NOTE — TELEPHONE ENCOUNTER
[facility] pharmacy is requesting a 90-day supply. Per pharmacy Patient may pay the same or less copay for 90-day supplies than three 30-day supplies.

## 2021-06-25 NOTE — TELEPHONE ENCOUNTER
Refills Approved x 3    Rx renewed per Medication Renewal Policy. Medication was last renewed on 12/15/2020.  Last office visit was 05/19/2021 with PCP.    Li Caballero, [facility] Triage/Med Refill 6/8/2021     Requested Prescriptions   Pending Prescriptions Disp Refills     allopurinoL (ZYLOPRIM) 100 MG tablet [Pharmacy Med Name: ALLOPURINOL 100 MG TABLET] 90 tablet 1     Sig: TAKE 1 TABLET BY MOUTH EVERY DAY       Allopurinol/Febuxostat Refill Protocol  Passed - 6/8/2021 12:11 AM        Passed - LFT or AST or ALT in last 12 months     Albumin   Date Value Ref Range Status   12/22/2020 3.9 3.5 - 5.0 g/dL Final     Bilirubin, Total   Date Value Ref Range Status   12/22/2020 0.4 0.0 - 1.0 mg/dL Final     Bilirubin, Direct   Date Value Ref Range Status   07/07/2020 0.2 <=0.5 mg/dL Final     Alkaline Phosphatase   Date Value Ref Range Status   12/22/2020 136 (H) 45 - 120 U/L Final     AST   Date Value Ref Range Status   12/22/2020 76 (H) 0 - 40 U/L Final     ALT   Date Value Ref Range Status   12/22/2020 68 (H) 0 - 45 U/L Final     Protein, Total   Date Value Ref Range Status   12/22/2020 7.0 6.0 - 8.0 g/dL Final                Passed - Visit with PCP or prescribing provider visit in past 12 months     Last office visit with prescriber/PCP: 5/19/2021 Shira Contreras MD OR same dept: 5/19/2021 Shira Contreras MD OR same specialty: 5/19/2021 Shira Contreras MD  Last physical: 3/12/2020 Last MTM visit: Visit date not found   Next visit within 3 mo: Visit date not found  Next physical within 3 mo: Visit date not found  Prescriber OR PCP: Shira Contreras MD  Last diagnosis associated with med order: 1. Idiopathic chronic gout without tophus, unspecified site  - allopurinoL (ZYLOPRIM) 100 MG tablet [Pharmacy Med Name: ALLOPURINOL 100 MG TABLET]; TAKE 1 TABLET BY MOUTH EVERY DAY  Dispense: 90 tablet; Refill: 1    2. Elevated triglycerides with high cholesterol  - atorvastatin (LIPITOR) 20 MG tablet [Pharmacy  Med Name: ATORVASTATIN 20 MG TABLET]; TAKE 1 TABLET BY MOUTH EVERYDAY AT BEDTIME  Dispense: 90 tablet; Refill: 1    3. Benign essential hypertension  - losartan (COZAAR) 25 MG tablet [Pharmacy Med Name: LOSARTAN POTASSIUM 25 MG TAB]; TAKE 1 TABLET BY MOUTH EVERY DAY  Dispense: 90 tablet; Refill: 1  - diltiazem (CARDIZEM CD) 180 MG 24 hr capsule [Pharmacy Med Name: DILTIAZEM 24H ER(CD) 180 MG CP]; TAKE 1 CAPSULE BY MOUTH EVERY DAY  Dispense: 90 capsule; Refill: 1    4. SVT (supraventricular tachycardia) (H)  - diltiazem (CARDIZEM CD) 180 MG 24 hr capsule [Pharmacy Med Name: DILTIAZEM 24H ER(CD) 180 MG CP]; TAKE 1 CAPSULE BY MOUTH EVERY DAY  Dispense: 90 capsule; Refill: 1    If protocol passes may refill for 12 months if within 3 months of last provider visit (or a total of 15 months).                atorvastatin (LIPITOR) 20 MG tablet [Pharmacy Med Name: ATORVASTATIN 20 MG TABLET] 90 tablet 1     Sig: TAKE 1 TABLET BY MOUTH EVERYDAY AT BEDTIME       Statins Refill Protocol (Hmg CoA Reductase Inhibitors) Passed - 6/8/2021 12:11 AM        Passed - PCP or prescribing provider visit in past 12 months      Last office visit with prescriber/PCP: 5/19/2021 Shira Contreras MD OR same dept: 5/19/2021 Shira Contreras MD OR same specialty: 5/19/2021 Shira Contreras MD  Last physical: 3/12/2020 Last MTM visit: Visit date not found   Next visit within 3 mo: Visit date not found  Next physical within 3 mo: Visit date not found  Prescriber OR PCP: Shira Contreras MD  Last diagnosis associated with med order: 1. Idiopathic chronic gout without tophus, unspecified site  - allopurinoL (ZYLOPRIM) 100 MG tablet [Pharmacy Med Name: ALLOPURINOL 100 MG TABLET]; TAKE 1 TABLET BY MOUTH EVERY DAY  Dispense: 90 tablet; Refill: 1    2. Elevated triglycerides with high cholesterol  - atorvastatin (LIPITOR) 20 MG tablet [Pharmacy Med Name: ATORVASTATIN 20 MG TABLET]; TAKE 1 TABLET BY MOUTH EVERYDAY AT BEDTIME  Dispense: 90 tablet; Refill:  1    3. Benign essential hypertension  - losartan (COZAAR) 25 MG tablet [Pharmacy Med Name: LOSARTAN POTASSIUM 25 MG TAB]; TAKE 1 TABLET BY MOUTH EVERY DAY  Dispense: 90 tablet; Refill: 1  - diltiazem (CARDIZEM CD) 180 MG 24 hr capsule [Pharmacy Med Name: DILTIAZEM 24H ER(CD) 180 MG CP]; TAKE 1 CAPSULE BY MOUTH EVERY DAY  Dispense: 90 capsule; Refill: 1    4. SVT (supraventricular tachycardia) (H)  - diltiazem (CARDIZEM CD) 180 MG 24 hr capsule [Pharmacy Med Name: DILTIAZEM 24H ER(CD) 180 MG CP]; TAKE 1 CAPSULE BY MOUTH EVERY DAY  Dispense: 90 capsule; Refill: 1    If protocol passes may refill for 12 months if within 3 months of last provider visit (or a total of 15 months).                losartan (COZAAR) 25 MG tablet [Pharmacy Med Name: LOSARTAN POTASSIUM 25 MG TAB] 90 tablet 1     Sig: TAKE 1 TABLET BY MOUTH EVERY DAY       Angiotensin Receptor Blocker Protocol Passed - 6/8/2021 12:11 AM        Passed - PCP or prescribing provider visit in past 12 months       Last office visit with prescriber/PCP: 5/19/2021 Shira Contreras MD OR same dept: 5/19/2021 Shira Contreras MD OR same specialty: 5/19/2021 Shira Contreras MD  Last physical: 3/12/2020 Last MTM visit: Visit date not found   Next visit within 3 mo: Visit date not found  Next physical within 3 mo: Visit date not found  Prescriber OR PCP: Shira Contreras MD  Last diagnosis associated with med order: 1. Idiopathic chronic gout without tophus, unspecified site  - allopurinoL (ZYLOPRIM) 100 MG tablet [Pharmacy Med Name: ALLOPURINOL 100 MG TABLET]; TAKE 1 TABLET BY MOUTH EVERY DAY  Dispense: 90 tablet; Refill: 1    2. Elevated triglycerides with high cholesterol  - atorvastatin (LIPITOR) 20 MG tablet [Pharmacy Med Name: ATORVASTATIN 20 MG TABLET]; TAKE 1 TABLET BY MOUTH EVERYDAY AT BEDTIME  Dispense: 90 tablet; Refill: 1    3. Benign essential hypertension  - losartan (COZAAR) 25 MG tablet [Pharmacy Med Name: LOSARTAN POTASSIUM 25 MG TAB]; TAKE 1 TABLET  BY MOUTH EVERY DAY  Dispense: 90 tablet; Refill: 1  - diltiazem (CARDIZEM CD) 180 MG 24 hr capsule [Pharmacy Med Name: DILTIAZEM 24H ER(CD) 180 MG CP]; TAKE 1 CAPSULE BY MOUTH EVERY DAY  Dispense: 90 capsule; Refill: 1    4. SVT (supraventricular tachycardia) (H)  - diltiazem (CARDIZEM CD) 180 MG 24 hr capsule [Pharmacy Med Name: DILTIAZEM 24H ER(CD) 180 MG CP]; TAKE 1 CAPSULE BY MOUTH EVERY DAY  Dispense: 90 capsule; Refill: 1    If protocol passes may refill for 12 months if within 3 months of last provider visit (or a total of 15 months).             Passed - Serum potassium within the past 12 months     Lab Results   Component Value Date    Potassium 4.3 12/22/2020             Passed - Blood pressure filed in past 12 months     BP Readings from Last 1 Encounters:   05/19/21 130/70             Passed - Serum creatinine within the past 12 months     Creatinine   Date Value Ref Range Status   12/22/2020 0.74 0.70 - 1.30 mg/dL Final                diltiazem (CARDIZEM CD) 180 MG 24 hr capsule [Pharmacy Med Name: DILTIAZEM 24H ER(CD) 180 MG CP] 90 capsule 1     Sig: TAKE 1 CAPSULE BY MOUTH EVERY DAY       Calcium-Channel Blockers Protocol Passed - 6/8/2021 12:11 AM        Passed - PCP or prescribing provider visit in past 12 months or next 3 months     Last office visit with prescriber/PCP: 5/19/2021 Shira Contreras MD OR same dept: 5/19/2021 Shira Contreras MD OR same specialty: 5/19/2021 Shira Contreras MD  Last physical: 3/12/2020 Last MTM visit: Visit date not found   Next visit within 3 mo: Visit date not found  Next physical within 3 mo: Visit date not found  Prescriber OR PCP: Shira Contreras MD  Last diagnosis associated with med order: 1. Idiopathic chronic gout without tophus, unspecified site  - allopurinoL (ZYLOPRIM) 100 MG tablet [Pharmacy Med Name: ALLOPURINOL 100 MG TABLET]; TAKE 1 TABLET BY MOUTH EVERY DAY  Dispense: 90 tablet; Refill: 1    2. Elevated triglycerides with high cholesterol  -  atorvastatin (LIPITOR) 20 MG tablet [Pharmacy Med Name: ATORVASTATIN 20 MG TABLET]; TAKE 1 TABLET BY MOUTH EVERYDAY AT BEDTIME  Dispense: 90 tablet; Refill: 1    3. Benign essential hypertension  - losartan (COZAAR) 25 MG tablet [Pharmacy Med Name: LOSARTAN POTASSIUM 25 MG TAB]; TAKE 1 TABLET BY MOUTH EVERY DAY  Dispense: 90 tablet; Refill: 1  - diltiazem (CARDIZEM CD) 180 MG 24 hr capsule [Pharmacy Med Name: DILTIAZEM 24H ER(CD) 180 MG CP]; TAKE 1 CAPSULE BY MOUTH EVERY DAY  Dispense: 90 capsule; Refill: 1    4. SVT (supraventricular tachycardia) (H)  - diltiazem (CARDIZEM CD) 180 MG 24 hr capsule [Pharmacy Med Name: DILTIAZEM 24H ER(CD) 180 MG CP]; TAKE 1 CAPSULE BY MOUTH EVERY DAY  Dispense: 90 capsule; Refill: 1    If protocol passes may refill for 12 months if within 3 months of last provider visit (or a total of 15 months).             Passed - Blood pressure filed in past 12 months     BP Readings from Last 1 Encounters:   05/19/21 130/70

## 2021-06-25 NOTE — TELEPHONE ENCOUNTER
Refill Approved    Rx renewed per Medication Renewal Policy. Medication was last renewed on 12/15/20.    Claudio Roa, [facility] Connection Triage/Med Refill 6/7/2021     Requested Prescriptions   Pending Prescriptions Disp Refills     FLUoxetine (PROZAC) 20 MG capsule [Pharmacy Med Name: FLUOXETINE HCL 20 MG CAPSULE] 90 capsule 1     Sig: TAKE 1 CAPSULE BY MOUTH EVERY DAY       SSRI Refill Protocol  Passed - 6/7/2021 12:16 AM        Passed - PCP or prescribing provider visit in last year     Last office visit with prescriber/PCP: 5/19/2021 Shira Contreras MD OR same dept: 5/19/2021 Shira Contreras MD OR same specialty: 5/19/2021 Shira Contreras MD  Last physical: 3/12/2020 Last MTM visit: Visit date not found   Next visit within 3 mo: Visit date not found  Next physical within 3 mo: Visit date not found  Prescriber OR PCP: Shira Contreras MD  Last diagnosis associated with med order: 1. Recurrent major depressive disorder, in full remission (H)  - FLUoxetine (PROZAC) 20 MG capsule [Pharmacy Med Name: FLUOXETINE HCL 20 MG CAPSULE]; TAKE 1 CAPSULE BY MOUTH EVERY DAY  Dispense: 90 capsule; Refill: 1  - buPROPion (WELLBUTRIN XL) 150 MG 24 hr tablet [Pharmacy Med Name: BUPROPION HCL  MG TABLET]; TAKE 1 TABLET BY MOUTH EVERY DAY  Dispense: 90 tablet; Refill: 1    If protocol passes may refill for 12 months if within 3 months of last provider visit (or a total of 15 months).                buPROPion (WELLBUTRIN XL) 150 MG 24 hr tablet [Pharmacy Med Name: BUPROPION HCL  MG TABLET] 90 tablet 1     Sig: TAKE 1 TABLET BY MOUTH EVERY DAY       Tricyclics/Misc Antidepressant/Antianxiety Meds Refill Protocol Passed - 6/7/2021 12:16 AM        Passed - PCP or prescribing provider visit in last year     Last office visit with prescriber/PCP: 5/19/2021 Shira Contreras MD OR same dept: 5/19/2021 Shira Contreras MD OR same specialty: 5/19/2021 Shira Contreras MD  Last physical: 3/12/2020 Last MTM visit: Visit date  not found   Next visit within 3 mo: Visit date not found  Next physical within 3 mo: Visit date not found  Prescriber OR PCP: Shira Contreras MD  Last diagnosis associated with med order: 1. Recurrent major depressive disorder, in full remission (H)  - FLUoxetine (PROZAC) 20 MG capsule [Pharmacy Med Name: FLUOXETINE HCL 20 MG CAPSULE]; TAKE 1 CAPSULE BY MOUTH EVERY DAY  Dispense: 90 capsule; Refill: 1  - buPROPion (WELLBUTRIN XL) 150 MG 24 hr tablet [Pharmacy Med Name: BUPROPION HCL  MG TABLET]; TAKE 1 TABLET BY MOUTH EVERY DAY  Dispense: 90 tablet; Refill: 1    If protocol passes may refill for 12 months if within 3 months of last provider visit (or a total of 15 months).

## 2021-09-23 ASSESSMENT — ACTIVITIES OF DAILY LIVING (ADL): CURRENT_FUNCTION: NO ASSISTANCE NEEDED

## 2021-09-24 ENCOUNTER — OFFICE VISIT (OUTPATIENT)
Dept: FAMILY MEDICINE | Facility: CLINIC | Age: 71
End: 2021-09-24
Payer: MEDICARE

## 2021-09-24 VITALS
WEIGHT: 260.58 LBS | SYSTOLIC BLOOD PRESSURE: 120 MMHG | HEART RATE: 67 BPM | DIASTOLIC BLOOD PRESSURE: 80 MMHG | BODY MASS INDEX: 38.48 KG/M2

## 2021-09-24 DIAGNOSIS — J98.4 RESTRICTIVE LUNG DISEASE: ICD-10-CM

## 2021-09-24 DIAGNOSIS — F10.10 MILD ALCOHOL USE DISORDER: ICD-10-CM

## 2021-09-24 DIAGNOSIS — F33.42 RECURRENT MAJOR DEPRESSIVE DISORDER, IN FULL REMISSION (H): ICD-10-CM

## 2021-09-24 DIAGNOSIS — J43.8 OTHER EMPHYSEMA (H): ICD-10-CM

## 2021-09-24 DIAGNOSIS — E11.9 TYPE 2 DIABETES MELLITUS WITHOUT COMPLICATION, WITHOUT LONG-TERM CURRENT USE OF INSULIN (H): ICD-10-CM

## 2021-09-24 DIAGNOSIS — I10 BENIGN ESSENTIAL HYPERTENSION: ICD-10-CM

## 2021-09-24 DIAGNOSIS — K64.9 BLEEDING HEMORRHOIDS: ICD-10-CM

## 2021-09-24 DIAGNOSIS — E78.2 ELEVATED TRIGLYCERIDES WITH HIGH CHOLESTEROL: ICD-10-CM

## 2021-09-24 DIAGNOSIS — M17.0 PRIMARY OSTEOARTHRITIS OF BOTH KNEES: ICD-10-CM

## 2021-09-24 DIAGNOSIS — Z13.6 ENCOUNTER FOR ABDOMINAL AORTIC ANEURYSM (AAA) SCREENING: ICD-10-CM

## 2021-09-24 DIAGNOSIS — Z12.5 SCREENING PSA (PROSTATE SPECIFIC ANTIGEN): ICD-10-CM

## 2021-09-24 DIAGNOSIS — I47.10 SVT (SUPRAVENTRICULAR TACHYCARDIA) (H): ICD-10-CM

## 2021-09-24 DIAGNOSIS — E04.2 MULTIPLE THYROID NODULES: ICD-10-CM

## 2021-09-24 DIAGNOSIS — Z00.00 ENCOUNTER FOR MEDICARE ANNUAL WELLNESS EXAM: Primary | ICD-10-CM

## 2021-09-24 LAB
ALBUMIN SERPL-MCNC: 3.9 G/DL (ref 3.5–5)
ALP SERPL-CCNC: 122 U/L (ref 45–120)
ALT SERPL W P-5'-P-CCNC: 50 U/L (ref 0–45)
ANION GAP SERPL CALCULATED.3IONS-SCNC: 12 MMOL/L (ref 5–18)
AST SERPL W P-5'-P-CCNC: 48 U/L (ref 0–40)
BILIRUB SERPL-MCNC: 0.4 MG/DL (ref 0–1)
BUN SERPL-MCNC: 13 MG/DL (ref 8–22)
CALCIUM SERPL-MCNC: 9.6 MG/DL (ref 8.5–10.5)
CHLORIDE BLD-SCNC: 104 MMOL/L (ref 98–107)
CHOLEST SERPL-MCNC: 175 MG/DL
CO2 SERPL-SCNC: 25 MMOL/L (ref 22–31)
CREAT SERPL-MCNC: 0.79 MG/DL (ref 0.7–1.3)
FASTING STATUS PATIENT QL REPORTED: ABNORMAL
GFR SERPL CREATININE-BSD FRML MDRD: >90 ML/MIN/1.73M2
GLUCOSE BLD-MCNC: 139 MG/DL (ref 70–125)
HBA1C MFR BLD: 6 % (ref 0–5.6)
HDLC SERPL-MCNC: 53 MG/DL
LDLC SERPL CALC-MCNC: 91 MG/DL
POTASSIUM BLD-SCNC: 4.6 MMOL/L (ref 3.5–5)
PROT SERPL-MCNC: 7 G/DL (ref 6–8)
PSA SERPL-MCNC: 0.32 UG/L (ref 0–4.5)
SODIUM SERPL-SCNC: 141 MMOL/L (ref 136–145)
TRIGL SERPL-MCNC: 154 MG/DL
TSH SERPL DL<=0.005 MIU/L-ACNC: 1.68 UIU/ML (ref 0.3–5)

## 2021-09-24 PROCEDURE — G0008 ADMIN INFLUENZA VIRUS VAC: HCPCS | Performed by: FAMILY MEDICINE

## 2021-09-24 PROCEDURE — 36415 COLL VENOUS BLD VENIPUNCTURE: CPT | Performed by: FAMILY MEDICINE

## 2021-09-24 PROCEDURE — 80061 LIPID PANEL: CPT | Performed by: FAMILY MEDICINE

## 2021-09-24 PROCEDURE — 84443 ASSAY THYROID STIM HORMONE: CPT | Performed by: FAMILY MEDICINE

## 2021-09-24 PROCEDURE — G0439 PPPS, SUBSEQ VISIT: HCPCS | Performed by: FAMILY MEDICINE

## 2021-09-24 PROCEDURE — 90662 IIV NO PRSV INCREASED AG IM: CPT | Performed by: FAMILY MEDICINE

## 2021-09-24 PROCEDURE — 80053 COMPREHEN METABOLIC PANEL: CPT | Performed by: FAMILY MEDICINE

## 2021-09-24 PROCEDURE — G0103 PSA SCREENING: HCPCS | Performed by: FAMILY MEDICINE

## 2021-09-24 PROCEDURE — 99213 OFFICE O/P EST LOW 20 MIN: CPT | Mod: 25 | Performed by: FAMILY MEDICINE

## 2021-09-24 PROCEDURE — 83036 HEMOGLOBIN GLYCOSYLATED A1C: CPT | Performed by: FAMILY MEDICINE

## 2021-09-24 ASSESSMENT — ANXIETY QUESTIONNAIRES
IF YOU CHECKED OFF ANY PROBLEMS ON THIS QUESTIONNAIRE, HOW DIFFICULT HAVE THESE PROBLEMS MADE IT FOR YOU TO DO YOUR WORK, TAKE CARE OF THINGS AT HOME, OR GET ALONG WITH OTHER PEOPLE: NOT DIFFICULT AT ALL
1. FEELING NERVOUS, ANXIOUS, OR ON EDGE: NOT AT ALL
6. BECOMING EASILY ANNOYED OR IRRITABLE: NOT AT ALL
GAD7 TOTAL SCORE: 0
7. FEELING AFRAID AS IF SOMETHING AWFUL MIGHT HAPPEN: NOT AT ALL
5. BEING SO RESTLESS THAT IT IS HARD TO SIT STILL: NOT AT ALL
3. WORRYING TOO MUCH ABOUT DIFFERENT THINGS: NOT AT ALL
2. NOT BEING ABLE TO STOP OR CONTROL WORRYING: NOT AT ALL

## 2021-09-24 ASSESSMENT — ACTIVITIES OF DAILY LIVING (ADL): CURRENT_FUNCTION: NO ASSISTANCE NEEDED

## 2021-09-24 ASSESSMENT — PATIENT HEALTH QUESTIONNAIRE - PHQ9
SUM OF ALL RESPONSES TO PHQ QUESTIONS 1-9: 6
5. POOR APPETITE OR OVEREATING: NOT AT ALL

## 2021-09-24 NOTE — PROGRESS NOTES
"SUBJECTIVE:   Christopher Lindsey is a 70 year old male who presents for Preventive Visit.      Patient has been advised of split billing requirements and indicates understanding: Yes   Are you in the first 12 months of your Medicare coverage?  No    Healthy Habits:     In general, how would you rate your overall health?  Fair    Frequency of exercise:  2-3 days/week    Duration of exercise:  15-30 minutes    Do you usually eat at least 4 servings of fruit and vegetables a day, include whole grains    & fiber and avoid regularly eating high fat or \"junk\" foods?  Yes    Taking medications regularly:  No    Medication side effects:  None    Ability to successfully perform activities of daily living:  No assistance needed    Home Safety:  No safety concerns identified    Hearing Impairment:  Difficulty following a conversation in a noisy restaurant or crowded room    In the past 6 months, have you been bothered by leaking of urine?  No    In general, how would you rate your overall mental or emotional health?  Fair      PHQ-2 Total Score: 2    Additional concerns today:  Yes  knee pain: hard to do walking d/t pains, bilateral, XR 2019 OA mod to severe    Eye exam done and normal  Discussed reasonable to get 3rd COVID vaccine given age and hx emphysema, will consider getting at pharmacy    Hemorrhoids: off and on for a long time (years), bleeding couple times per week    Mood: some days better than others, hasn't changed much, feels is manageable at this time    Do you feel safe in your environment? Yes    Have you ever done Advance Care Planning? (For example, a Health Directive, POLST, or a discussion with a medical provider or your loved ones about your wishes): Yes, advance care planning is on file.       Fall risk  Fallen 2 or more times in the past year?: No  Any fall with injury in the past year?: No    Cognitive Screening   1) Repeat 3 items (Leader, Season, Table)    2) Clock draw: NORMAL  3) 3 item recall: " Recalls 3 objects  Results: 3 items recalled: COGNITIVE IMPAIRMENT LESS LIKELY    Do you have sleep apnea, excessive snoring or daytime drowsiness?: no    Reviewed and updated as needed this visit by clinical staff  Tobacco  Allergies  Meds  Problems  Med Hx  Surg Hx  Fam Hx          Reviewed and updated as needed this visit by Provider  Tobacco  Allergies  Meds  Problems  Med Hx  Surg Hx  Fam Hx         Social History     Tobacco Use     Smoking status: Former Smoker     Packs/day: 1.00     Years: 40.00     Pack years: 40.00     Quit date: 2014     Years since quittin.7     Smokeless tobacco: Never Used     Tobacco comment: still using e-cigs at lowest level, trying to quit   Substance Use Topics     Alcohol use: Yes     Alcohol Use 2021   Prescreen: >3 drinks/day or >7 drinks/week? Yes   AUDIT SCORE  22   drinking more as a coping strategy, reports stress lately, stress since going into snf    Current providers sharing in care for this patient include:   Patient Care Team:  Shira Contreras MD as PCP - General  Shira Contreras MD as Assigned PCP    The following health maintenance items are reviewed in Epic and correct as of today:  Health Maintenance Due   Topic Date Due     DIABETIC FOOT EXAM  Never done     SPIROMETRY  Never done     ANNUAL REVIEW OF HM ORDERS  Never done     COPD ACTION PLAN  Never done     DEPRESSION ACTION PLAN  Never done     EYE EXAM  Never done     HEPATITIS C SCREENING  Never done     ZOSTER IMMUNIZATION (1 of 2) Never done     AORTIC ANEURYSM SCREENING (SYSTEM ASSIGNED)  Never done     A1C  2021     Review of Systems  Constitutional, HEENT, cardiovascular, pulmonary, GI, , musculoskeletal, neuro, skin, endocrine and psych systems are negative, except as otherwise noted.    OBJECTIVE:   /80   Pulse 67   Wt 118.2 kg (260 lb 9.3 oz)   BMI 38.48 kg/m   Estimated body mass index is 38.48 kg/m  as calculated from the  "following:    Height as of 3/12/20: 1.753 m (5' 9\").    Weight as of this encounter: 118.2 kg (260 lb 9.3 oz).  Physical Exam  GENERAL: healthy, alert and no distress, obese  EYES: Eyes grossly normal to inspection, conjunctivae and sclerae normal  RESP: lungs clear to auscultation - no rales, rhonchi or wheezes  CV: regular rate and rhythm, normal S1 S2, no S3 or S4, no murmur, click or rub, no peripheral edema and peripheral pulses strong  Diabetic foot exam: normal DP and PT pulses, no trophic changes or ulcerative lesions, normal sensory exam and normal monofilament exam  MS: no gross musculoskeletal defects noted, trace edema bilaterally  SKIN: no suspicious lesions or rashes  NEURO: Normal strength and tone, mentation intact and speech normal  PSYCH: mentation appears normal, affect normal    ASSESSMENT / PLAN:   Christopher was seen today for knee pain and rectal problem.    Diagnoses and all orders for this visit:    Encounter for Medicare annual wellness exam  Annual wellness exam completed today. Concerns as below. Labs as below. Flu shot given today, otherwise up to date with immunizations. Colon cancer screening up to date.     Benign essential hypertension  Hx HTN, well controlled on current regimen, no changes today, check BMP today.  -     Comprehensive metabolic panel (BMP + Alb, Alk Phos, ALT, AST, Total. Bili, TP); Future    Primary osteoarthritis of both knees  Known bilateral moderate to severe OA in both knees from XR in 2019 - based on pt's report suspect this has worsened over past 2 years. Recommend referral to ortho to discuss options for management, pt in agreement.   -     Orthopedic  Referral; Future    Bleeding hemorrhoids  Pt reports issues with bleeding hemorrhoids, has had hemorrhoid surgery in the past which worked for some - interested in treatment again - referral placed to colorectal surgery.   -     Colorectal Surgery Referral; Future    Encounter for abdominal aortic aneurysm " (AAA) screening  Given age and smoking hx due for AAA screening; pt unsure if this was completed before moving to [place] and I am unable to find this in records - ordered screening test.   -      Aorta Medicare AAA Screening; Future    Elevated triglycerides with high cholesterol  Due for lipid panel today.  -     Lipid panel reflex to direct LDL Fasting; Future    Multiple thyroid nodules  Hx thyroid nodules, will check thyroid function today.   -     TSH with free T4 reflex; Future    Other emphysema (H)  Restrictive lung disease  Stable, no acute concerns; discussed 3rd COVID vaccine at pharmacy given age and lung disease.     Recurrent major depressive disorder, in full remission (H)  Hx depression, pt reports overall stable but then during our visit hints at some worsening mood - declines therapy referral at this time and wishes to not change medication regimen. Recommend pt monitor mood closely and reach out to me as needed so we can make adjustments together.    SVT (supraventricular tachycardia) (H)  Stable, no acute concerns. HR normal today. No change to regimen.    Type 2 diabetes mellitus without complication, without long-term current use of insulin (H)  -     Hemoglobin A1c; Future    Screening PSA (prostate specific antigen)  Will check screening PSA today, pt reports some changes to urine lately, no hematuria.   -     PSA, screen; Future    Mild alcohol use disorder  Pt reports drinking more than he should lately, AUDIT score elevated as above, drinking most days of week and typically 5+ drinks per day; pt reports drinking as a coping method for stress/mood, reports some issues since retiring. We brainstormed some ideas together, I offered referral to therapy and pt declines at this time. Could consider chemical dependency treatment/counseling or medication such as naltrexone - will discuss in future if needed.    Other orders  -     [place] FLU VACCINE, INCREASED ANTIGEN, PRESV  "FREE        COUNSELING:  Reviewed preventive health counseling, as reflected in patient instructions       Consider AAA screening for ages 65-75 and smoking history       Regular exercise       Healthy diet/nutrition       Vision screening       Alcohol Use        Prostate cancer screening    Estimated body mass index is 38.48 kg/m  as calculated from the following:    Height as of 3/12/20: 1.753 m (5' 9\").    Weight as of this encounter: 118.2 kg (260 lb 9.3 oz).    Weight management plan: Discussed healthy diet and exercise guidelines    He reports that he quit smoking about 7 years ago. He has a 40.00 pack-year smoking history. He has never used smokeless tobacco.      Appropriate preventive services were discussed with this patient, including applicable screening as appropriate for cardiovascular disease, diabetes, osteopenia/osteoporosis, and glaucoma.  As appropriate for age/gender, discussed screening for colorectal cancer, prostate cancer, breast cancer, and cervical cancer. Checklist reviewing preventive services available has been given to the patient.    Reviewed patients plan of care and provided an AVS. The Basic Care Plan (routine screening as documented in Health Maintenance) for Christopher meets the Care Plan requirement. This Care Plan has been established and reviewed with the Patient.      Shira Contreras MD  [facility]    Identified Health Risks:    The patient's PHQ-9 score is consistent with mild depression. He was provided with information regarding depression and was advised to schedule a follow up appointment we needed.   "

## 2021-09-24 NOTE — PATIENT INSTRUCTIONS
Patient Education   Personalized Prevention Plan  You are due for the preventive services outlined below.  Your care team is available to assist you in scheduling these services.  If you have already completed any of these items, please share that information with your care team to update in your medical record.  Health Maintenance Due   Topic Date Due     Diabetic Foot Exam  Never done     Breathing Capacity Test  Never done     ANNUAL REVIEW OF HM ORDERS  Never done     COPD Action Plan  Never done     Depression Action Plan  Never done     Eye Exam  Never done     Hepatitis C Screening  Never done     Zoster (Shingles) Vaccine (1 of 2) Never done     AORTIC ANEURYSM SCREENING (SYSTEM ASSIGNED)  Never done     A1C Lab  08/19/2021     Preventive Health Recommendations  See your health care provider every year to    Review health changes.     Discuss preventive care.      Review your medicines if your doctor has prescribed any.    Talk with your health care provider about whether you should have a test to screen for prostate cancer (PSA).    Every 3 years, have a diabetes test (fasting glucose). If you are at risk for diabetes, you should have this test more often.    Every 5 years, have a cholesterol test. Have this test more often if you are at risk for high cholesterol or heart disease.     Every 10 years, have a colonoscopy. Or, have a yearly FIT test (stool test). These exams will check for colon cancer.    Talk to with your health care provider about screening for Abdominal Aortic Aneurysm if you have a family history of AAA or have a history of smoking.    Shots:     Get a flu shot each year.     Get a tetanus shot every 10 years.     Talk to your doctor about your pneumonia vaccines. There are now two you should receive - Pneumovax (PPSV 23) and Prevnar (PCV 13).    Talk to your pharmacist about a shingles vaccine.     Talk to your doctor about the hepatitis B vaccine.    Nutrition:     Eat at least 5  servings of fruits and vegetables each day.     Eat whole-grain bread, whole-wheat pasta and brown rice instead of white grains and rice.     Get adequate Calcium and Vitamin D.     Lifestyle    Exercise for at least 150 minutes a week (30 minutes a day, 5 days a week). This will help you control your weight and prevent disease.     Limit alcohol to one drink per day.     No smoking.     Wear sunscreen to prevent skin cancer.     See your dentist every six months for an exam and cleaning.     See your eye doctor every 1 to 2 years to screen for conditions such as glaucoma, macular degeneration and cataracts.    Personalized Prevention Plan  You are due for the preventive services outlined below.  Your care team is available to assist you in scheduling these services.  If you have already completed any of these items, please share that information with your care team to update in your medical record.  Health Maintenance   Topic Date Due     DIABETIC FOOT EXAM  Never done     SPIROMETRY  Never done     ANNUAL REVIEW OF HM ORDERS  Never done     COPD ACTION PLAN  Never done     DEPRESSION ACTION PLAN  Never done     EYE EXAM  Never done     HEPATITIS C SCREENING  Never done     ZOSTER IMMUNIZATION (1 of 2) Never done     AORTIC ANEURYSM SCREENING (SYSTEM ASSIGNED)  Never done     A1C  08/19/2021     BMP  12/22/2021     PHQ-9  03/24/2022     LIPID  05/19/2022     MICROALBUMIN  05/19/2022     FALL RISK ASSESSMENT  05/19/2022     MEDICARE ANNUAL WELLNESS VISIT  09/24/2022     COLORECTAL CANCER SCREENING  01/11/2026     ADVANCE CARE PLANNING  09/24/2026     DTAP/TDAP/TD IMMUNIZATION (2 - Td or Tdap) 03/07/2029     INFLUENZA VACCINE  Completed     Pneumococcal Vaccine: 65+ Years  Completed     COVID-19 Vaccine  Completed     IPV IMMUNIZATION  Aged Out     MENINGITIS IMMUNIZATION  Aged Out       Depression and Suicide in Older Adults    Nearly 2 million older Americans have some type of depression. Some of them even take  "their own lives. Yet depression among older adults is often ignored. Learn the warning signs. You may help spare a loved one needless pain. You may also save a life.   What is depression?  Depression is a common and serious illness that affects the way you think and feel. It is not a normal part of aging, nor is it a sign of weakness, a character flaw, or something you can snap out of. Most people with depression need treatment to get better. The most common symptom is a feeling of deep sadness. People who are depressed also may seem tired and listless. And nothing seems to give them pleasure. It s normal to grieve or be sad sometimes. But sadness lessens or passes with time. Depression rarely goes away or improves on its own. A person with clinical depression can't \"snap out of it.\" Other symptoms of depression are:     Sleeping more or less than normal    Eating more or less than normal    Having headaches, stomachaches, or other pains that don t go away    Feeling nervous,  empty,  or worthless    Crying a great deal    Thinking or talking about suicide or death    Loss of interest in activities previously enjoyed    Social isolation    Feeling confused or forgetful  What causes it?  The causes of depression aren t fully known. But it is thought to result from a complex blend of these factors:     Biochemistry. Certain chemicals in the brain play a role.    Genes. Depression does run in families.    Life stress. Life stresses can also trigger depression in some people. Older adults often face many stressors, such as death of friends or a spouse, health problems, and financial concerns.    Chronic conditions. This includes conditions such as diabetes, heart disease, or cancer. These can cause symptoms of depression. Medicine side effects can cause changes in thoughts and behaviors.  How you can help  Often, depressed people may not want to ask for help. When they do, they may be ignored. Or, they may receive the " wrong treatment. You can help by showing parents and older friends love and support. If they seem depressed, don t lecture the person, ignore the symptoms, or discount the symptoms as a  normal  part of aging -which they are not. Get involved, listen, and show interest and support.   Help them understand that depression is a treatable illness. Tell them you can help them find the right treatment. Offer to go to their healthcare provider's appointment with them for support when the symptoms are discussed. With their approval, contact a local mental health center, social service agency, or hospital about services.   You can be an advocate for him or her at healthcare appointments. Many older adults have chronic illnesses that can cause symptoms of depression. Medicine side effects can change thoughts and behaviors. You can help make sure that the healthcare provider looks at all of these factors. He or she should refer your family member or friend to a mental healthcare provider when needed. in some cases, untreated depression can lead to a misdiagnosis. A person may be diagnosed with a brain disorder such as dementia. If the healthcare provider does not take the issue of depression seriously, help your family member or friend to find another provider.   Don't be afraid to ask  If you think an older person you care about could be suicidal, ask,  Have you thought about suicide?  Most people will tell you the truth. If they say  yes,  they may already have a plan for how and when they will attempt it. Find out as much as you can. The more detailed the plan, and the easier it is to carry out, the more danger the person is in right now. Tell the person you are there for them and do not want them to harm him or herself. Don't wait to get help for the person. Call the person's healthcare provider, local hospital, or emergency services.   To learn more    National Suicide Prevention Lifeline (crisis hotline) 484-557-THKV  (408.443.3260)    National [place] of Mental Zehdhz711-085-9836mwj.[facility].nih.gov    National [place] on Mental Hyyfdpq820-589-6530aab.brunilda.org    Mental Health Qkfzcjv246-234-6058myw.[facility].org    National Suicide Lrohleg539-EKMETEO (782-366-7889)    Call 911  Never leave the person alone. A person who is actively suicidal needs psychiatric care right away. They will need constant supervision. Never leave the person out of sight. Call 911 or the [facility] 24-hour suicide crisis hotline at 209-113-VWYA (962-698-7468). You can also take the person to the closest emergency room.   [employer] last reviewed this educational content on 5/1/2020 2000-2021 [employer]. All rights reserved. This information is not intended as a substitute for professional medical care. Always follow your healthcare professional's instructions.

## 2021-09-25 ASSESSMENT — ANXIETY QUESTIONNAIRES: GAD7 TOTAL SCORE: 0

## 2021-09-29 DIAGNOSIS — I10 BENIGN ESSENTIAL HYPERTENSION: ICD-10-CM

## 2021-09-30 RX ORDER — LOSARTAN POTASSIUM 50 MG/1
TABLET ORAL
Qty: 90 TABLET | Refills: 3 | Status: SHIPPED | OUTPATIENT
Start: 2021-09-30 | End: 2022-03-28

## 2021-09-30 NOTE — TELEPHONE ENCOUNTER
"Routing refill request to provider for review/approval because:  A break in medication    Last Written Prescription Date:  5/19/21  Last Fill Quantity: 30,  # refills: 3   Last office visit provider: 5/19/21       Requested Prescriptions   Pending Prescriptions Disp Refills     losartan (COZAAR) 50 MG tablet [Pharmacy Med Name: LOSARTAN POTASSIUM 50 MG TAB] 90 tablet      Sig: TAKE 1 TABLET BY MOUTH EVERY DAY       Angiotensin-II Receptors Passed - 9/29/2021 11:55 AM        Passed - Last blood pressure under 140/90 in past 12 months     BP Readings from Last 3 Encounters:   09/24/21 120/80   05/19/21 130/70   03/15/21 (!) 146/84                 Passed - Recent (12 mo) or future (30 days) visit within the authorizing provider's specialty     Patient has had an office visit with the authorizing provider or a provider within the authorizing providers department within the previous 12 mos or has a future within next 30 days. See \"Patient Info\" tab in inbasket, or \"Choose Columns\" in Meds & Orders section of the refill encounter.              Passed - Medication is active on med list        Passed - Patient is age 18 or older        Passed - Normal serum creatinine on file in past 12 months     Recent Labs   Lab Test 09/24/21  1119   CR 0.79       Ok to refill medication if creatinine is low          Passed - Normal serum potassium on file in past 12 months     Recent Labs   Lab Test 09/24/21  1119   POTASSIUM 4.6                         Fani Arenas RN 09/30/21 3:28 PM  "

## 2021-10-05 ENCOUNTER — HOSPITAL ENCOUNTER (OUTPATIENT)
Dept: ULTRASOUND IMAGING | Facility: CLINIC | Age: 71
Discharge: HOME | End: 2021-10-05
Attending: FAMILY MEDICINE | Admitting: FAMILY MEDICINE
Payer: MEDICARE

## 2021-10-05 DIAGNOSIS — Z13.6 ENCOUNTER FOR ABDOMINAL AORTIC ANEURYSM (AAA) SCREENING: ICD-10-CM

## 2021-10-05 PROCEDURE — 76706 US ABDL AORTA SCREEN AAA: CPT

## 2021-10-06 ENCOUNTER — TRANSFERRED RECORDS (OUTPATIENT)
Dept: HEALTH INFORMATION MANAGEMENT | Facility: CLINIC | Age: 71
End: 2021-10-06

## 2022-01-27 ENCOUNTER — VIRTUAL VISIT (OUTPATIENT)
Dept: FAMILY MEDICINE | Facility: CLINIC | Age: 72
End: 2022-01-27
Payer: MEDICARE

## 2022-01-27 ENCOUNTER — NURSE TRIAGE (OUTPATIENT)
Dept: NURSING | Facility: CLINIC | Age: 72
End: 2022-01-27

## 2022-01-27 DIAGNOSIS — E11.9 TYPE 2 DIABETES MELLITUS WITHOUT COMPLICATION, WITHOUT LONG-TERM CURRENT USE OF INSULIN (H): ICD-10-CM

## 2022-01-27 DIAGNOSIS — R09.81 SINUS CONGESTION: ICD-10-CM

## 2022-01-27 DIAGNOSIS — J98.4 RESTRICTIVE LUNG DISEASE: ICD-10-CM

## 2022-01-27 DIAGNOSIS — Z20.822 SUSPECTED 2019 NOVEL CORONAVIRUS INFECTION: Primary | ICD-10-CM

## 2022-01-27 DIAGNOSIS — R05.9 COUGH: ICD-10-CM

## 2022-01-27 PROCEDURE — 99441 PR PHYSICIAN TELEPHONE EVALUATION 5-10 MIN: CPT | Mod: 95 | Performed by: NURSE PRACTITIONER

## 2022-01-27 NOTE — TELEPHONE ENCOUNTER
Head congestion and sore throat. History of COPD. Wanted to touch base with MD to see if we're doing the right thing. Should he get a PCR test? His symptoms runny nose, sneezing on Tuesday then head congestion. Home tests were negative yesterday and this morning. Knowing it's early it doesn't really tell you anything. He's high risk. There are potential treatments. Should we get a PCR Test? Should he be on some medications? I suggested a Virtual Visit AND a PCR test. That provider can order further tests like influenza and strep and pursue any medications that could be prescribed.  I connected her with scheduling for PCR test and Virtual visit.  Jennifer Smith RN  [facility]       Additional Information    Negative: Nursing judgment    Negative: Nursing judgment    Nursing judgment    Protocols used: INFORMATION ONLY CALL - NO TRIAGE-A-OH

## 2022-01-27 NOTE — PROGRESS NOTES
Christopher is a 71 year old who is being evaluated via a billable telephone visit.      What phone number would you like to be contacted at? [phone]  How would you like to obtain your AVS? Shay      ICD-10-CM    1. Suspected 2019 novel coronavirus infection  Z20.822 Symptomatic; Yes; 1/25/2022 COVID-19 Virus (Coronavirus) by PCR   2. Sinus congestion  R09.81 Symptomatic; Yes; 1/25/2022 COVID-19 Virus (Coronavirus) by PCR   3. Cough  R05.9 Symptomatic; Yes; 1/25/2022 COVID-19 Virus (Coronavirus) by PCR   4. Type 2 diabetes mellitus without complication, without long-term current use of insulin (H)  E11.9    5. Restrictive lung disease  J98.4       Differentials include COVID-19, influenza, other viral illnesses, bronchitis, pneumonia.  Will obtain Covid testing.  Discussed symptomatic treatment for fever or discomfort with over-the-counter acetaminophen or ibuprofen as needed.  Recommend using over-the-counter nasal saline sprays to assist with sinus congestion and any postnasal drainage.  Discussed the importance of deep breathing exercises.  The patient is to avoid laying flat on the back.  I encouraged the patient to monitor oxygen saturations at home.  If the patient develops shortness of breath, suggest ER evaluation.  He will continue to monitor his blood sugars closely.  If he tests positive, he will likely qualify for monoclonal antibodies and Paxlovid.  Recommend quarantine per CDC and [facility] guidelines. The patient is content with the plan.       Subjective   Christopher is a 71 year old who presents for the following health issues   Patient presents today with concerns of cold symptoms for 3 days.  Patient complains of sinus congestion, sore throat, headache, slight cough.  He denies shortness of breath, chest tightness, wheezing, fever, nausea, vomiting, diarrhea.  He has not lost his sense of smell or taste.  No one else around him is ill.  He has been taking over-the-counter Advil and Tylenol.  He has received  his Covid and influenza vaccines.  He denies history of Covid in the past.  He has been eating and drinking well.  He has had 3 negative rapid tests.  He has been monitoring his oxygen saturation and it is 94% which he states is the same as when he is in the office, so he believes this is his baseline.  His blood sugar has been 130 fasting.        Review of Systems   Constitutional, HEENT, cardiovascular, pulmonary, gi and gu systems are negative, except as otherwise noted.      Objective           Vitals:  No vitals were obtained today due to virtual visit.    Physical Exam   Patient is alert and is speaking clearly.  He does not sound short of breath or have a cough.            Phone call duration: 9 minutes

## 2022-01-28 ENCOUNTER — LAB (OUTPATIENT)
Dept: FAMILY MEDICINE | Facility: CLINIC | Age: 72
End: 2022-01-28
Attending: NURSE PRACTITIONER
Payer: MEDICARE

## 2022-01-28 DIAGNOSIS — R09.81 SINUS CONGESTION: ICD-10-CM

## 2022-01-28 DIAGNOSIS — R05.9 COUGH: ICD-10-CM

## 2022-01-28 DIAGNOSIS — Z20.822 SUSPECTED 2019 NOVEL CORONAVIRUS INFECTION: ICD-10-CM

## 2022-01-28 LAB — SARS-COV-2 RNA RESP QL NAA+PROBE: NEGATIVE

## 2022-01-28 PROCEDURE — U0003 INFECTIOUS AGENT DETECTION BY NUCLEIC ACID (DNA OR RNA); SEVERE ACUTE RESPIRATORY SYNDROME CORONAVIRUS 2 (SARS-COV-2) (CORONAVIRUS DISEASE [COVID-19]), AMPLIFIED PROBE TECHNIQUE, MAKING USE OF HIGH THROUGHPUT TECHNOLOGIES AS DESCRIBED BY CMS-2020-01-R: HCPCS

## 2022-01-28 PROCEDURE — U0005 INFEC AGEN DETEC AMPLI PROBE: HCPCS

## 2022-01-30 ENCOUNTER — HEALTH MAINTENANCE LETTER (OUTPATIENT)
Age: 72
End: 2022-01-30

## 2022-02-10 DIAGNOSIS — E78.2 ELEVATED TRIGLYCERIDES WITH HIGH CHOLESTEROL: ICD-10-CM

## 2022-02-11 RX ORDER — FENOFIBRATE 48 MG/1
TABLET, COATED ORAL
Qty: 90 TABLET | Refills: 3 | Status: SHIPPED | OUTPATIENT
Start: 2022-02-11 | End: 2023-03-09

## 2022-03-05 DIAGNOSIS — F33.42 RECURRENT MAJOR DEPRESSIVE DISORDER, IN FULL REMISSION (H): ICD-10-CM

## 2022-03-07 RX ORDER — OLANZAPINE 5 MG/1
TABLET ORAL
Qty: 90 TABLET | Refills: 3 | Status: SHIPPED | OUTPATIENT
Start: 2022-03-07 | End: 2022-06-23

## 2022-03-07 NOTE — TELEPHONE ENCOUNTER
"Routing refill request to provider for review/approval because:  Labs not current:  CBC    Last Written Prescription Date:  3/15/21  Last Fill Quantity: 90,  # refills: 3   Last office visit provider:  1/27/22     Requested Prescriptions   Pending Prescriptions Disp Refills     OLANZapine (ZYPREXA) 5 MG tablet [Pharmacy Med Name: OLANZAPINE 5 MG TABLET] 90 tablet 3     Sig: TAKE 1 TABLET BY MOUTH EVERY DAY       Antipsychotic Medications Failed - 3/7/2022 10:50 AM        Failed - CBC on file in past 12 months     No lab results found.              Passed - Blood pressure under 140/90 in past 12 months     BP Readings from Last 3 Encounters:   09/24/21 120/80   05/19/21 130/70   03/15/21 (!) 146/84                 Passed - Patient is 12 years of age or older        Passed - Lipid panel on file within the past 12 months     Recent Labs   Lab Test 09/24/21  1119   CHOL 175   TRIG 154*   HDL 53   LDL 91               Passed - Heart Rate on file within past 12 months     Pulse Readings from Last 3 Encounters:   09/24/21 67   05/19/21 88   03/15/21 82               Passed - A1c or Glucose on file in past 12 months     Recent Labs   Lab Test 09/24/21  1119   *   A1C 6.0*       Please review patients last 3 weights. If a weight gain of >10 lbs exists, you may refill the prescription once after instructing the patient to schedule an appointment within the next 30 days.    Wt Readings from Last 3 Encounters:   09/24/21 118.2 kg (260 lb 9.3 oz)   05/19/21 117 kg (258 lb)   03/15/21 117.7 kg (259 lb 8 oz)             Passed - Medication is active on med list        Passed - Recent (6 mo) or future (30 days) visit within the authorizing provider's specialty     Patient had office visit in the last 6 months or has a visit in the next 30 days with authorizing provider or within the authorizing provider's specialty.  See \"Patient Info\" tab in inbasket, or \"Choose Columns\" in Meds & Orders section of the refill encounter.     "             Claudio Roa RN 03/07/22 10:50 AM

## 2022-03-09 ENCOUNTER — NURSE TRIAGE (OUTPATIENT)
Dept: NURSING | Facility: CLINIC | Age: 72
End: 2022-03-09
Payer: MEDICARE

## 2022-03-09 DIAGNOSIS — M1A.00X0 IDIOPATHIC CHRONIC GOUT WITHOUT TOPHUS, UNSPECIFIED SITE: ICD-10-CM

## 2022-03-09 DIAGNOSIS — E78.2 ELEVATED TRIGLYCERIDES WITH HIGH CHOLESTEROL: ICD-10-CM

## 2022-03-09 DIAGNOSIS — F33.42 RECURRENT MAJOR DEPRESSIVE DISORDER, IN FULL REMISSION (H): ICD-10-CM

## 2022-03-09 DIAGNOSIS — I47.10 SVT (SUPRAVENTRICULAR TACHYCARDIA) (H): ICD-10-CM

## 2022-03-09 DIAGNOSIS — I10 BENIGN ESSENTIAL HYPERTENSION: ICD-10-CM

## 2022-03-09 RX ORDER — ALLOPURINOL 100 MG/1
TABLET ORAL
Qty: 90 TABLET | Refills: 3 | Status: SHIPPED | OUTPATIENT
Start: 2022-03-09 | End: 2023-03-06

## 2022-03-09 RX ORDER — BUPROPION HYDROCHLORIDE 150 MG/1
TABLET ORAL
Qty: 90 TABLET | Refills: 3 | Status: SHIPPED | OUTPATIENT
Start: 2022-03-09 | End: 2023-03-06

## 2022-03-09 RX ORDER — DILTIAZEM HYDROCHLORIDE 180 MG/1
CAPSULE, COATED, EXTENDED RELEASE ORAL
Qty: 90 CAPSULE | Refills: 3 | Status: SHIPPED | OUTPATIENT
Start: 2022-03-09 | End: 2023-03-06

## 2022-03-09 RX ORDER — ATORVASTATIN CALCIUM 20 MG/1
TABLET, FILM COATED ORAL
Qty: 90 TABLET | Refills: 3 | Status: SHIPPED | OUTPATIENT
Start: 2022-03-09 | End: 2023-03-16

## 2022-03-09 NOTE — TELEPHONE ENCOUNTER
Switching pharmacy from [facility] to [facility] on [address] in [place]. He needs to have refills sent to [facility] for;  Allopurinol, Atorvastatin, Fluoxetine, Diltiazem, Bupropion, Losartan.  He will be out of these medications in 3 days.   Message forwarded to medication refill pool.     Ayleen Partida RN Triage Nurse Advisor 3:50 PM 3/9/2022

## 2022-03-09 NOTE — TELEPHONE ENCOUNTER
"Refills requested: Allopurinol, Atorvastatin, Fluoxetine, Diltiazem, Bupropion, Losartan  New pharmacy: [facility] in [place] on [address]   He will be out of supply of these medications in 3 days.     Ayleen Partida RN Triage Nurse Advisor 3:35 PM 3/9/2022    Routing refill request to provider for review/approval because: Failed protocols: Allopurinol, Fluoxetine, Diltiazem, Bupropion    Labs not current:  CBC, uric acid, Labs out of range: ALT  PHQ-9 score needed    Last Written Prescription Date: 6/8/2021  Last Fill Quantity: 90,  # refills: 3   Last office visit provider:  1/27/2022 Virtual visit with MANUEL Langford CNP      Passed protocol: Atorvastatin     Requested Prescriptions   Pending Prescriptions Disp Refills     allopurinol (ZYLOPRIM) 100 MG tablet 90 tablet 3     Sig: [ALLOPURINOL (ZYLOPRIM) 100 MG TABLET] TAKE 1 TABLET BY MOUTH EVERY DAY       Gout Agents Protocol Failed - 3/9/2022  3:37 PM        Failed - CBC on file in past 12 months     No lab results found.              Failed - Has Uric Acid on file in past 12 months and value is less than 6     Recent Labs   Lab Test 12/22/20  0907   URIC 6.3     If level is 6mg/dL or greater, ok to refill one time and refer to provider.           Passed - ALT on file in past 12 months     Recent Labs   Lab Test 09/24/21  1119   ALT 50*             Passed - Recent (12 mo) or future (30 days) visit within the authorizing provider's specialty     Patient has had an office visit with the authorizing provider or a provider within the authorizing providers department within the previous 12 mos or has a future within next 30 days. See \"Patient Info\" tab in inbasket, or \"Choose Columns\" in Meds & Orders section of the refill encounter.              Passed - Medication is active on med list        Passed - Patient is age 18 or older        Passed - Normal serum creatinine on file in the past 12 months     Recent Labs   Lab Test 09/24/21  1119   CR 0.79       Ok to refill " "medication if creatinine is low             atorvastatin (LIPITOR) 20 MG tablet 90 tablet 3     Sig: [ATORVASTATIN (LIPITOR) 20 MG TABLET] TAKE 1 TABLET BY MOUTH EVERYDAY AT BEDTIME       Statins Protocol Passed - 3/9/2022  3:37 PM        Passed - LDL on file in past 12 months     Recent Labs   Lab Test 09/24/21  1119   LDL 91             Passed - No abnormal creatine kinase in past 12 months     No lab results found.             Passed - Recent (12 mo) or future (30 days) visit within the authorizing provider's specialty     Patient has had an office visit with the authorizing provider or a provider within the authorizing providers department within the previous 12 mos or has a future within next 30 days. See \"Patient Info\" tab in inbasket, or \"Choose Columns\" in Meds & Orders section of the refill encounter.              Passed - Medication is active on med list        Passed - Patient is age 18 or older           buPROPion (WELLBUTRIN XL) 150 MG 24 hr tablet 90 tablet 3     Sig: [BUPROPION (WELLBUTRIN XL) 150 MG 24 HR TABLET] TAKE 1 TABLET BY MOUTH EVERY DAY       SSRIs Protocol Failed - 3/9/2022  3:37 PM        Failed - PHQ-9 score less than 5 in past 6 months     Please review last PHQ-9 score.           Passed - Medication is Bupropion     If the medication is Bupropion (Wellbutrin), and the patient is taking for smoking cessation; OK to refill.          Passed - Medication is active on med list        Passed - Patient is age 18 or older        Passed - Recent (6 mo) or future (30 days) visit within the authorizing provider's specialty     Patient had office visit in the last 6 months or has a visit in the next 30 days with authorizing provider or within the authorizing provider's specialty.  See \"Patient Info\" tab in inbasket, or \"Choose Columns\" in Meds & Orders section of the refill encounter.               diltiazem ER COATED BEADS (CARDIZEM CD/CARTIA XT) 180 MG 24 hr capsule 90 capsule 3     Sig: " "[DILTIAZEM (CARDIZEM CD) 180 MG 24 HR CAPSULE] TAKE 1 CAPSULE BY MOUTH EVERY DAY       Calcium Channel Blockers Protocol  Failed - 3/9/2022  3:37 PM        Failed - Normal ALT in past 12 months     Recent Labs   Lab Test 09/24/21  1119   ALT 50*             Passed - Blood pressure under 140/90 in past 12 months     BP Readings from Last 3 Encounters:   09/24/21 120/80   05/19/21 130/70   03/15/21 (!) 146/84                 Passed - Recent (12 mo) or future (30 days) visit within the authorizing provider's specialty     Patient has had an office visit with the authorizing provider or a provider within the authorizing providers department within the previous 12 mos or has a future within next 30 days. See \"Patient Info\" tab in inbasket, or \"Choose Columns\" in Meds & Orders section of the refill encounter.              Passed - Medication is active on med list        Passed - Patient is age 18 or older        Passed - Normal serum creatinine on file in past 12 months     Recent Labs   Lab Test 09/24/21  1119   CR 0.79       Ok to refill medication if creatinine is low             FLUoxetine (PROZAC) 20 MG capsule 90 capsule 3     Sig: [FLUOXETINE (PROZAC) 20 MG CAPSULE] TAKE 1 CAPSULE BY MOUTH EVERY DAY       SSRIs Protocol Failed - 3/9/2022  3:37 PM        Failed - PHQ-9 score less than 5 in past 6 months     Please review last PHQ-9 score.           Passed - Medication is Bupropion     If the medication is Bupropion (Wellbutrin), and the patient is taking for smoking cessation; OK to refill.          Passed - Medication is active on med list        Passed - Patient is age 18 or older        Passed - Recent (6 mo) or future (30 days) visit within the authorizing provider's specialty     Patient had office visit in the last 6 months or has a visit in the next 30 days with authorizing provider or within the authorizing provider's specialty.  See \"Patient Info\" tab in inbasket, or \"Choose Columns\" in Meds & Orders " section of the refill encounter.                 Mary Jo Partida RN 03/09/22 3:39 PM

## 2022-03-28 DIAGNOSIS — I10 BENIGN ESSENTIAL HYPERTENSION: ICD-10-CM

## 2022-03-28 RX ORDER — LOSARTAN POTASSIUM 50 MG/1
50 TABLET ORAL DAILY
Qty: 90 TABLET | Refills: 3 | Status: SHIPPED | OUTPATIENT
Start: 2022-03-28 | End: 2023-03-30

## 2022-03-28 NOTE — TELEPHONE ENCOUNTER
Last clinic visit: 01/27/2022    Clinic visit frequency required: Q 6  months    Next clinic visit: N/A    Pending Prescriptions:                       Disp   Refills    losartan (COZAAR) 50 MG tablet            90 tab*3            Sig: Take 1 tablet (50 mg) by mouth daily

## 2022-05-22 ENCOUNTER — HEALTH MAINTENANCE LETTER (OUTPATIENT)
Age: 72
End: 2022-05-22

## 2022-06-21 ASSESSMENT — PATIENT HEALTH QUESTIONNAIRE - PHQ9
10. IF YOU CHECKED OFF ANY PROBLEMS, HOW DIFFICULT HAVE THESE PROBLEMS MADE IT FOR YOU TO DO YOUR WORK, TAKE CARE OF THINGS AT HOME, OR GET ALONG WITH OTHER PEOPLE: SOMEWHAT DIFFICULT
SUM OF ALL RESPONSES TO PHQ QUESTIONS 1-9: 9
SUM OF ALL RESPONSES TO PHQ QUESTIONS 1-9: 9

## 2022-06-22 NOTE — PROGRESS NOTES
Assessment/Plan:    Moderate episode of recurrent major depressive disorder (H)  Worsening/uncontrolled depressive symptoms at this time. Referral placed to psychiatry for stabilization; will also increase zyprexa dose to 10mg at bedtime for now - discussed if not seeing much improvement after 2 weeks would increase prozac dose - unsure how long it will take to get in with psychiatry so want to make changes (and hopefully see improvement) while we wait.  - Adult Mental Health  Referral  - OLANZapine (ZYPREXA) 5 MG tablet  Dispense: 90 tablet; Refill: 3    Leg cramps  Unclear cause - will evaluate further as below.   - Iron  - CBC with platelets  - Basic metabolic panel  (Ca, Cl, CO2, Creat, Gluc, K, Na, BUN)  - Magnesium    Need for hepatitis C screening test  Due for one time hep C screening test.  - Hepatitis C Screen Reflex to HCV RNA Quant and Genotype    Type 2 diabetes mellitus without complication, without long-term current use of insulin (H)  Hx DM2, due for labs as below, diet controlled.   - HEMOGLOBIN A1C  - Albumin Random Urine Quantitative with Creat Ratio      Follow up: pending test results and symptoms    Shira Contreras MD  [facility]    Subjective:   Chritsopher Lindsey is a 71 year old male is here today for multiple concerns    -leg cramps - middle of the night or early morning, feels like maxx horses, both legs and feet and then feel sore afterwards, 6 times in past 4 weeks  -mood not doing as well as it had in the past - feeling more depressed - thoughts of wishing he wasn't here or that he didn't wake up, reports taking higher dose of prozac in the past, states zyprexa was really helpful when he first started it, reports trying many medications in the past  -PHQ9 score as below - currently taking wellbutrin, prozac and zyprexa      Answers for HPI/ROS submitted by the patient on 6/21/2022  If you checked off any problems, how difficult have these problems made it for you  to do your work, take care of things at home, or get along with other people?: Somewhat difficult  PHQ9 TOTAL SCORE: 9  Depression/Anxiety: Depression  Status since last visit:: worse  Other associated symptoms of depression:: No  Significant life event: : No  Anxious:: No  Current substance use:: Yes  How many servings of fruits and vegetables do you eat daily?: 0-1  On average, how many sweetened beverages do you drink each day (Examples: soda, juice, sweet tea, etc.  Do NOT count diet or artificially sweetened beverages)?: 0  How many minutes a day do you exercise enough to make your heart beat faster?: 10 to 19  How many days a week do you exercise enough to make your heart beat faster?: 4  How many days per week do you miss taking your medication?: 0  What is the reason for your visit today?: Leg cramps depression  When did your symptoms begin?: 1-2 weeks ago  What are your symptoms?: Leg cramps  How would you describe these symptoms?: Moderate  Are your symptoms:: Staying the same  Have you had these symptoms before?: No      Patient Active Problem List   Diagnosis     Elevated triglycerides with high cholesterol     Benign essential hypertension     Other emphysema (H)     Recurrent major depressive disorder, in full remission (H)     SVT (supraventricular tachycardia) (H)     TERESA (obstructive sleep apnea)     Bilateral primary osteoarthritis of knee     Obesity (BMI 35.0-39.9) with comorbidity (H)     Obesity hypoventilation syndrome (H)     Restrictive lung disease     Hearing problem of both ears     Multiple thyroid nodules     Abnormal liver ultrasound     Hx of colonic polyps     Hx of gout     Type 2 diabetes mellitus without complication, without long-term current use of insulin (H)     Past Medical History:   Diagnosis Date     Benign essential hypertension 12/4/2018     Elevated triglycerides with high cholesterol 12/4/2018     TERESA (obstructive sleep apnea) 12/4/2018    Has BIPAP machine     Other  emphysema (H) 2018    Hx following with pulmonology, [facility]     Patellofemoral disorder of both knees 2018     Prediabetes 2018     Recurrent major depressive disorder, in full remission (H) 2018     SVT (supraventricular tachycardia) (H) 2018    Hx 2 episodes in past year, now on diltiazem for this     Past Surgical History:   Procedure Laterality Date     HEMORRHOIDECTOMY EXTERNAL  2004     INCISION AND DRAINAGE PERIRECTAL ABSCESS       Current Outpatient Medications   Medication     OLANZapine (ZYPREXA) 5 MG tablet     allopurinol (ZYLOPRIM) 100 MG tablet     atorvastatin (LIPITOR) 20 MG tablet     buPROPion (WELLBUTRIN XL) 150 MG 24 hr tablet     diltiazem ER COATED BEADS (CARDIZEM CD/CARTIA XT) 180 MG 24 hr capsule     fenofibrate (TRICOR) 48 MG tablet     FLUoxetine (PROZAC) 20 MG capsule     losartan (COZAAR) 50 MG tablet     No current facility-administered medications for this visit.     No Known Allergies  Social History     Socioeconomic History     Marital status:      Spouse name: Not on file     Number of children: Not on file     Years of education: Not on file     Highest education level: Not on file   Occupational History     Not on file   Tobacco Use     Smoking status: Former Smoker     Packs/day: 1.00     Years: 40.00     Pack years: 40.00     Quit date: 2014     Years since quittin.4     Smokeless tobacco: Never Used     Tobacco comment: still using e-cigs at lowest level, trying to quit   Substance and Sexual Activity     Alcohol use: Yes     Drug use: No     Sexual activity: Not on file   Other Topics Concern     Not on file   Social History Narrative     Not on file     Social Determinants of Health     Financial Resource Strain: Not on file   Food Insecurity: Not on file   Transportation Needs: Not on file   Physical Activity: Not on file   Stress: Not on file   Social Connections: Not on file   Intimate Partner Violence: Not on file   Housing  Stability: Not on file     Family History   Problem Relation Age of Onset     Pancreatic Cancer Mother 62.00     Depression Mother      Cancer Father 88.00        gallbladder     Diabetes Sister      Depression Sister      No Known Problems Brother      Heart Failure Maternal Grandmother          early 70s     Cerebrovascular Disease Maternal Grandfather          in 80s     Heart Failure Paternal Grandmother          in 90s     Coronary Artery Disease Paternal Grandfather         late 60s     Heart Failure Paternal Grandfather      No Known Problems Brother      Review of systems is as stated in HPI, and the remainder of system review is otherwise negative.    Objective:     /82   Pulse 84   Wt 115.2 kg (254 lb)   SpO2 94%   BMI 37.51 kg/m      General appearance: awake, NAD, obese  HEENT: atraumatic, normocephalic, no scleral icterus or injection  Lungs: breathing comfortably on room air  Extremities: moving all extremities  Neuro: alert, oriented x3, CNs grossly intact, no focal deficits appreciated  Psych: more flat than normal, appears more depressed, answering questions appropriately, linear thought process

## 2022-06-23 ENCOUNTER — OFFICE VISIT (OUTPATIENT)
Dept: FAMILY MEDICINE | Facility: CLINIC | Age: 72
End: 2022-06-23
Payer: MEDICARE

## 2022-06-23 VITALS
HEART RATE: 84 BPM | WEIGHT: 254 LBS | DIASTOLIC BLOOD PRESSURE: 82 MMHG | BODY MASS INDEX: 37.51 KG/M2 | OXYGEN SATURATION: 94 % | SYSTOLIC BLOOD PRESSURE: 130 MMHG

## 2022-06-23 DIAGNOSIS — D64.9 ANEMIA, UNSPECIFIED TYPE: ICD-10-CM

## 2022-06-23 DIAGNOSIS — Z11.59 NEED FOR HEPATITIS C SCREENING TEST: ICD-10-CM

## 2022-06-23 DIAGNOSIS — F33.1 MODERATE EPISODE OF RECURRENT MAJOR DEPRESSIVE DISORDER (H): Primary | ICD-10-CM

## 2022-06-23 DIAGNOSIS — R25.2 LEG CRAMPS: ICD-10-CM

## 2022-06-23 DIAGNOSIS — E11.9 TYPE 2 DIABETES MELLITUS WITHOUT COMPLICATION, WITHOUT LONG-TERM CURRENT USE OF INSULIN (H): ICD-10-CM

## 2022-06-23 LAB
ANION GAP SERPL CALCULATED.3IONS-SCNC: 12 MMOL/L (ref 5–18)
BUN SERPL-MCNC: 18 MG/DL (ref 8–28)
CALCIUM SERPL-MCNC: 9.6 MG/DL (ref 8.5–10.5)
CHLORIDE BLD-SCNC: 101 MMOL/L (ref 98–107)
CO2 SERPL-SCNC: 29 MMOL/L (ref 22–31)
CREAT SERPL-MCNC: 0.9 MG/DL (ref 0.7–1.3)
CREAT UR-MCNC: 303 MG/DL
ERYTHROCYTE [DISTWIDTH] IN BLOOD BY AUTOMATED COUNT: 13.8 % (ref 10–15)
GFR SERPL CREATININE-BSD FRML MDRD: >90 ML/MIN/1.73M2
GLUCOSE BLD-MCNC: 139 MG/DL (ref 70–125)
HBA1C MFR BLD: 5.9 % (ref 0–5.6)
HCT VFR BLD AUTO: 43.8 % (ref 40–53)
HGB BLD-MCNC: 14.4 G/DL (ref 13.3–17.7)
IRON SERPL-MCNC: 108 UG/DL (ref 42–175)
MAGNESIUM SERPL-MCNC: 1.9 MG/DL (ref 1.8–2.6)
MCH RBC QN AUTO: 33.3 PG (ref 26.5–33)
MCHC RBC AUTO-ENTMCNC: 32.9 G/DL (ref 31.5–36.5)
MCV RBC AUTO: 101 FL (ref 78–100)
MICROALBUMIN UR-MCNC: 1.46 MG/DL (ref 0–1.99)
MICROALBUMIN/CREAT UR: 4.8 MG/G CR
PLATELET # BLD AUTO: 175 10E3/UL (ref 150–450)
POTASSIUM BLD-SCNC: 4.6 MMOL/L (ref 3.5–5)
RBC # BLD AUTO: 4.33 10E6/UL (ref 4.4–5.9)
SODIUM SERPL-SCNC: 142 MMOL/L (ref 136–145)
WBC # BLD AUTO: 9.1 10E3/UL (ref 4–11)

## 2022-06-23 PROCEDURE — 80048 BASIC METABOLIC PNL TOTAL CA: CPT | Performed by: FAMILY MEDICINE

## 2022-06-23 PROCEDURE — 83735 ASSAY OF MAGNESIUM: CPT | Performed by: FAMILY MEDICINE

## 2022-06-23 PROCEDURE — 83540 ASSAY OF IRON: CPT | Performed by: FAMILY MEDICINE

## 2022-06-23 PROCEDURE — 36415 COLL VENOUS BLD VENIPUNCTURE: CPT | Performed by: FAMILY MEDICINE

## 2022-06-23 PROCEDURE — 85027 COMPLETE CBC AUTOMATED: CPT | Performed by: FAMILY MEDICINE

## 2022-06-23 PROCEDURE — 99214 OFFICE O/P EST MOD 30 MIN: CPT | Performed by: FAMILY MEDICINE

## 2022-06-23 PROCEDURE — 82043 UR ALBUMIN QUANTITATIVE: CPT | Performed by: FAMILY MEDICINE

## 2022-06-23 PROCEDURE — 86803 HEPATITIS C AB TEST: CPT | Performed by: FAMILY MEDICINE

## 2022-06-23 PROCEDURE — 83036 HEMOGLOBIN GLYCOSYLATED A1C: CPT | Performed by: FAMILY MEDICINE

## 2022-06-23 RX ORDER — OLANZAPINE 5 MG/1
10 TABLET ORAL DAILY
Qty: 90 TABLET | Refills: 3
Start: 2022-06-23 | End: 2022-08-02

## 2022-06-23 ASSESSMENT — ANXIETY QUESTIONNAIRES
5. BEING SO RESTLESS THAT IT IS HARD TO SIT STILL: NOT AT ALL
7. FEELING AFRAID AS IF SOMETHING AWFUL MIGHT HAPPEN: NOT AT ALL
3. WORRYING TOO MUCH ABOUT DIFFERENT THINGS: SEVERAL DAYS
2. NOT BEING ABLE TO STOP OR CONTROL WORRYING: SEVERAL DAYS
GAD7 TOTAL SCORE: 3
1. FEELING NERVOUS, ANXIOUS, OR ON EDGE: NOT AT ALL
GAD7 TOTAL SCORE: 3
6. BECOMING EASILY ANNOYED OR IRRITABLE: SEVERAL DAYS

## 2022-06-23 ASSESSMENT — PATIENT HEALTH QUESTIONNAIRE - PHQ9: 5. POOR APPETITE OR OVEREATING: NOT AT ALL

## 2022-06-23 NOTE — PATIENT INSTRUCTIONS
Mood:  -increase olanzapine to 10 mg daily (two 5mg tablets)  -if not feeling much improvement after 2 weeks, [facility] an [facility] and would likely increase prozac to 40mg daily  -referral to psychiatry placed today - you will get a phone call to schedule this    Leg cramps:  -blood work today

## 2022-06-24 LAB — HCV AB SERPL QL IA: NONREACTIVE

## 2022-08-02 DIAGNOSIS — F33.1 MODERATE EPISODE OF RECURRENT MAJOR DEPRESSIVE DISORDER (H): ICD-10-CM

## 2022-08-02 RX ORDER — OLANZAPINE 5 MG/1
10 TABLET ORAL DAILY
Qty: 90 TABLET | Refills: 3 | Status: SHIPPED | OUTPATIENT
Start: 2022-08-02 | End: 2023-01-30

## 2022-08-02 NOTE — TELEPHONE ENCOUNTER
Pending Prescriptions:                       Disp   Refills    OLANZapine (ZYPREXA) 5 MG tablet          90 tab*3            Sig: Take 2 tablets (10 mg) by mouth daily

## 2022-08-30 ENCOUNTER — VIRTUAL VISIT (OUTPATIENT)
Dept: PSYCHIATRY | Facility: CLINIC | Age: 72
End: 2022-08-30
Attending: FAMILY MEDICINE
Payer: MEDICARE

## 2022-08-30 ENCOUNTER — VIRTUAL VISIT (OUTPATIENT)
Dept: BEHAVIORAL HEALTH | Facility: CLINIC | Age: 72
End: 2022-08-30
Payer: MEDICARE

## 2022-08-30 VITALS — BODY MASS INDEX: 36.92 KG/M2 | WEIGHT: 250 LBS

## 2022-08-30 DIAGNOSIS — F10.10 ALCOHOL ABUSE: ICD-10-CM

## 2022-08-30 DIAGNOSIS — F10.20 ALCOHOL USE DISORDER, MODERATE, DEPENDENCE (H): Primary | ICD-10-CM

## 2022-08-30 DIAGNOSIS — F33.1 MODERATE EPISODE OF RECURRENT MAJOR DEPRESSIVE DISORDER (H): ICD-10-CM

## 2022-08-30 DIAGNOSIS — F33.0 MILD EPISODE OF RECURRENT MAJOR DEPRESSIVE DISORDER (H): Primary | ICD-10-CM

## 2022-08-30 DIAGNOSIS — F33.42 RECURRENT MAJOR DEPRESSIVE DISORDER, IN FULL REMISSION (H): ICD-10-CM

## 2022-08-30 PROCEDURE — 99205 OFFICE O/P NEW HI 60 MIN: CPT | Mod: 95 | Performed by: PSYCHIATRY & NEUROLOGY

## 2022-08-30 PROCEDURE — 90791 PSYCH DIAGNOSTIC EVALUATION: CPT | Mod: 52 | Performed by: SOCIAL WORKER

## 2022-08-30 RX ORDER — BUPROPION HYDROCHLORIDE 150 MG/1
TABLET ORAL
Qty: 90 TABLET | Refills: 3 | Status: CANCELLED | OUTPATIENT
Start: 2022-08-30

## 2022-08-30 RX ORDER — OLANZAPINE 5 MG/1
10 TABLET ORAL DAILY
Qty: 90 TABLET | Refills: 3 | Status: CANCELLED | OUTPATIENT
Start: 2022-08-30

## 2022-08-30 RX ORDER — NALTREXONE HYDROCHLORIDE 50 MG/1
50 TABLET, FILM COATED ORAL DAILY
Qty: 30 TABLET | Refills: 2 | Status: SHIPPED | OUTPATIENT
Start: 2022-08-30 | End: 2023-03-02

## 2022-08-30 ASSESSMENT — ANXIETY QUESTIONNAIRES
1. FEELING NERVOUS, ANXIOUS, OR ON EDGE: NOT AT ALL
GAD7 TOTAL SCORE: 0
5. BEING SO RESTLESS THAT IT IS HARD TO SIT STILL: NOT AT ALL
7. FEELING AFRAID AS IF SOMETHING AWFUL MIGHT HAPPEN: NOT AT ALL
GAD7 TOTAL SCORE: 0
6. BECOMING EASILY ANNOYED OR IRRITABLE: NOT AT ALL
2. NOT BEING ABLE TO STOP OR CONTROL WORRYING: NOT AT ALL
3. WORRYING TOO MUCH ABOUT DIFFERENT THINGS: NOT AT ALL
IF YOU CHECKED OFF ANY PROBLEMS ON THIS QUESTIONNAIRE, HOW DIFFICULT HAVE THESE PROBLEMS MADE IT FOR YOU TO DO YOUR WORK, TAKE CARE OF THINGS AT HOME, OR GET ALONG WITH OTHER PEOPLE: NOT DIFFICULT AT ALL

## 2022-08-30 ASSESSMENT — COLUMBIA-SUICIDE SEVERITY RATING SCALE - C-SSRS
1. HAVE YOU WISHED YOU WERE DEAD OR WISHED YOU COULD GO TO SLEEP AND NOT WAKE UP?: YES
6. HAVE YOU EVER DONE ANYTHING, STARTED TO DO ANYTHING, OR PREPARED TO DO ANYTHING TO END YOUR LIFE?: NO
REASONS FOR IDEATION LIFETIME: MOSTLY TO END OR STOP THE PAIN (YOU COULDN'T GO ON LIVING WITH THE PAIN OR HOW YOU WERE FEELING)
TOTAL  NUMBER OF ABORTED OR SELF INTERRUPTED ATTEMPTS LIFETIME: NO
ATTEMPT LIFETIME: NO
2. HAVE YOU ACTUALLY HAD ANY THOUGHTS OF KILLING YOURSELF?: NO
1. IN THE PAST MONTH, HAVE YOU WISHED YOU WERE DEAD OR WISHED YOU COULD GO TO SLEEP AND NOT WAKE UP?: YES
REASONS FOR IDEATION PAST MONTH: MOSTLY TO END OR STOP THE PAIN (YOU COULDN'T GO ON LIVING WITH THE PAIN OR HOW YOU WERE FEELING)
TOTAL  NUMBER OF INTERRUPTED ATTEMPTS LIFETIME: NO

## 2022-08-30 ASSESSMENT — PATIENT HEALTH QUESTIONNAIRE - PHQ9
5. POOR APPETITE OR OVEREATING: NOT AT ALL
SUM OF ALL RESPONSES TO PHQ QUESTIONS 1-9: 4

## 2022-08-30 NOTE — PATIENT INSTRUCTIONS
Start naltrexone 50 mg daily.    Please contact Addiction Medicine at [phone] when you are ready to schedule an appointment.    Continue all other medications per your primary care provider.    Per our conversation, you are being referred back to the care of your primary care provider.  All future refill requests should go to them.    It was been a pleasure to meet you, best wishes for the future!    [place] Resources:    Go to the Emergency Department as needed or call after hours crisis line at [phone].    To schedule individual or family therapy, call [facility] at [phone].   Follow up with primary care provider as planned or sooner for acute medical concerns.  Call the psychiatric nurse line with medication questions or concerns at [phone].  [facility] may be used to communicate with your provider, but this is not intended to be used for emergencies.    Community Resources:    National Suicide Prevention Lifeline: 386.297.2169 (TTY: 344.224.1117). Call anytime for help.  (www.suicidepreventionlifeline.org)  National [place] on Mental Illness (www.brunilda.org): 469.980.4510 or 655-412-9105.   Mental Health Association (www.mentalhealth.org): 650.591.7883 or 578-147-2899.  [place] Crisis Text Line: Text [place] to 298240  Suicide LifeLine Chat: suicidepreImmunovative Therapiesline.org/chat

## 2022-08-30 NOTE — Clinical Note
Adrian Silva,  I met with Christopher today.  He reports that he is doing significantly better since he increased his olanzapine at the end of June.  We agreed to continue his current medications, and he preferred to be returned to your care rather than continue to follow with me.  He and I are both concerned about his alcohol use as described in my note.  He was given an referral to addiction medicine, but does not feel quite ready to pursue it.  He felt comfortable talking with you about his concerns.  Please feel free to contact me with questions or concerns.  Thank you for the opportunity to be involved in the care of this patient.  Regards, Kelly Montes MD [facility]  Kelly Montes MD [facility]

## 2022-08-30 NOTE — PROGRESS NOTES
Telemedicine Visit: The patient's condition can be safely assessed and treated via synchronous audio and visual telemedicine encounter.      Reason for Telemedicine Visit: Services only offered telehealth    Originating Site (Patient Location): Patient's home    Distant Site (Provider Location): Provider Remote Setting- Home Office    Consent:  The patient/guardian has verbally consented to: the potential risks and benefits of telemedicine (video visit) versus in person care; bill my insurance or make self-payment for services provided; and responsibility for payment of non-covered services.     Mode of Communication:  Video Conference via [employer]    As the provider I attest to compliance with applicable laws and regulations related to telemedicine.      Christopher is a 71 year old who is being evaluated via a billable video visit.      How would you like to obtain your AVS? MyChart  If the video visit is dropped, the invitation should be resent by: Send to e-mail at: [email or web address]  Will anyone else be joining your video visit? No                                                                Outpatient Psychiatric Evaluation- Standard  Adult    Name:  Christopher Lindsey  : 1950    Source of Referral:  Primary Care Provider: Shira Contreras MD   Current Psychotherapist: None     Identifying Data:  Patient is a 71 year old  male  who presents for initial visit.  Patient attended the session alone. Patient prefers to be called Christopher.    My Practice Policy was reviewed.     Chief Complaint:  Depression    HPI:  Per DA by Michelle Law LICSW:  Pt shares that he started with depression in his 30's and have continued off and on.  Has had medication changes throughout the years.  About 7 years ago he has a bad depressive episode and had a medication change at that time and found the medication changes made at that time very positive.  Did well for several years and In  of this year he has seen an  increase in some symptoms.  Saw PCP 2 months ago and did have an increase in his zyprexa which has been somewhat helpful especially the past 2 weeks.  When feeling down will have thoughts of wishing he was dead or wouldn't wake up but denies any thoughts or past hx of hurting himself.  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  He describes himself as an introvert and shares he really likes music, reading, and walking with his dog.  Admits that he drinks a lot of wine most days and scored a 3/4 on the [employer].  Pt shares that he hasn't been through treatment in the past.  He thinks that if he got serious about cutting back or quitting his drinking he could but admits that when he is feeling down it does help a bit with his mood.  Denies any other substance use.      As above, Christopher reports that he first experienced a depressive episode in 1984 when he was about 34 years old.  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  He had a 3-year-old daughter and a 1-year-old son at home.  He saw a psychiatrist, and was started on fluoxetine 20 mg a day which helped with in a short time.  He stopped the medication briefly, but his depressive symptoms returned, so he restarted it and has been on an antidepressant since then.  He has been on up to 60 mg of fluoxetine at times.    He reports that every 2 or 3 years he would have another episode of depression and his medications would change.  He has had trials of paroxetine, sertraline, and bupropion.    He had a very severe episode about 7 years ago, and was started on a combination of fluoxetine and olanzapine in addition to the bupropion he was already prescribed.  He reports that this episode was severe enough that he considered hospitalization.  He denies any major stressors that may have triggered the episode.      He did well after starting the combination of fluoxetine, bupropion and olanzapine for  several years until this summer.  His symptoms started to increase in June and became worse in July.  He saw his primary care provider who increased his olanzapine to 10 mg a day and continued his fluoxetine 20 and bupropion 150.  The increase seems to have helped and he is no longer depressed.    He denies any major stressors at present.    Risks and benefits of olanzapine were reviewed including the risk of weight gain, increased risk of developing diabetes, and increased risk of elevated lipids.  He reports that he may have gained 20 to 30 pounds since starting olanzapine 7 years ago.  He has an elevated hemoglobin A1c, but is not on any medications to control his blood sugar at this point.  Lipids were last checked in September 2021.  His triglycerides were slightly elevated at that time.    Psychiatric Review of Symptoms:  Depression: Christopher rates his mood today as 8 out of 10 with 10 being the best, and denies being depressed at present.  He as above, he reports having had multiple episodes of major depression over the years with full remission in between episodes.  When he is depressed he has decreased interest in his normal activities, decreased appetite, low energy, feels hopeless, and has thoughts that he would be better off if he did not wake up in the morning.  He has never attempted suicide and does not have overt suicidal thoughts.    He reports that he has been diagnosed with sleep apnea and was prescribed BiPAP.  Unfortunately, he was not able to tolerate wearing the mask, so now sleeps with his head elevated which seems to help with his snoring.     Mood rating (1 to 10 with 10 being the best): 8   PHQ-9 scores:   PHQ-9 SCORE 9/24/2021 6/21/2022 8/30/2022   PHQ-9 Total Score MyChart - 9 (Mild depression) -   PHQ-9 Total Score 6 9 4       Rosemary: No history of manic episodes.   MDQ Score: Not completed    Anxiety: He denies significant anxiety or worry   IKE-7 scores:    IKE-7 SCORE 9/24/2021 6/23/2022  8/30/2022   Total Score 0 3 0       Panic: No history of panic attacks.    PTSD: No history of life-threatening trauma.    OCD: No history of obsessive thoughts or compulsive behaviors.    Psychosis: No history of auditory or visual hallucinations, paranoid ideations, ideas of reference, or thought insertion or extraction.    Impulse control: No history of gambling, shoplifting, or violent outbursts.    Eating Disorder: No history of binging, purging, or restricting calories.    Psychiatric History:   No previous psychiatric hospitalizations    No history of chemical dependency treatment    Suicide Risk Assessment:  Suicide Attempts: No   Self-injurious Behavior: Denies    Past Medical History:  Medical history:   Past Medical History:   Diagnosis Date     Benign essential hypertension 12/4/2018     Elevated triglycerides with high cholesterol 12/4/2018     TERESA (obstructive sleep apnea) 12/4/2018    Has BIPAP machine     Other emphysema (H) 12/4/2018    Hx following with pulmonology, [facility]     Patellofemoral disorder of both knees 12/4/2018     Prediabetes 12/4/2018     Recurrent major depressive disorder, in full remission (H) 12/4/2018     SVT (supraventricular tachycardia) (H) 12/4/2018    Hx 2 episodes in past year, now on diltiazem for this       Surgical history:   Past Surgical History:   Procedure Laterality Date     HEMORRHOIDECTOMY EXTERNAL  2004     INCISION AND DRAINAGE PERIRECTAL ABSCESS         Trauma: He denies a history of head trauma or seizures.    Review of Systems:  Sleep apnea, overweight, hypertension, bad knees    Current Medications:    Current Outpatient Medications:      allopurinol (ZYLOPRIM) 100 MG tablet, [ALLOPURINOL (ZYLOPRIM) 100 MG TABLET] TAKE 1 TABLET BY MOUTH EVERY DAY, Disp: 90 tablet, Rfl: 3     atorvastatin (LIPITOR) 20 MG tablet, [ATORVASTATIN (LIPITOR) 20 MG TABLET] TAKE 1 TABLET BY MOUTH EVERYDAY AT BEDTIME, Disp: 90 tablet, Rfl: 3     buPROPion (WELLBUTRIN XL) 150 MG 24  hr tablet, [BUPROPION (WELLBUTRIN XL) 150 MG 24 HR TABLET] TAKE 1 TABLET BY MOUTH EVERY DAY, Disp: 90 tablet, Rfl: 3     diltiazem ER COATED BEADS (CARDIZEM CD/CARTIA XT) 180 MG 24 hr capsule, [DILTIAZEM (CARDIZEM CD) 180 MG 24 HR CAPSULE] TAKE 1 CAPSULE BY MOUTH EVERY DAY, Disp: 90 capsule, Rfl: 3     fenofibrate (TRICOR) 48 MG tablet, TAKE 1 TABLET BY MOUTH EVERY DAY, Disp: 90 tablet, Rfl: 3     FLUoxetine (PROZAC) 20 MG capsule, [FLUOXETINE (PROZAC) 20 MG CAPSULE] TAKE 1 CAPSULE BY MOUTH EVERY DAY, Disp: 90 capsule, Rfl: 3     losartan (COZAAR) 50 MG tablet, Take 1 tablet (50 mg) by mouth daily, Disp: 90 tablet, Rfl: 3     naltrexone (DEPADE/REVIA) 50 MG tablet, Take 1 tablet (50 mg) by mouth daily, Disp: 30 tablet, Rfl: 2     OLANZapine (ZYPREXA) 5 MG tablet, Take 2 tablets (10 mg) by mouth daily, Disp: 90 tablet, Rfl: 3    Supplements: Reviewed per Electronic Medical Record Today    The [place] Prescription Monitoring Program has been reviewed and there are no concerns about diversionary activity for controlled substances at this time.  No data for controlled substances over the last one year.     Past medication trials include but are not limited to:   See HPI    Allergies:  Patient has no known allergies.    Vital Signs:  Vitals: Wt 113.4 kg (250 lb)   BMI 36.92 kg/m      Labs:  Most recent laboratory results reviewed and the pertinent results include:   Last Comprehensive Metabolic Panel:  Sodium   Date Value Ref Range Status   06/23/2022 142 136 - 145 mmol/L Final     Potassium   Date Value Ref Range Status   06/23/2022 4.6 3.5 - 5.0 mmol/L Final     Chloride   Date Value Ref Range Status   06/23/2022 101 98 - 107 mmol/L Final     Carbon Dioxide (CO2)   Date Value Ref Range Status   06/23/2022 29 22 - 31 mmol/L Final     Anion Gap   Date Value Ref Range Status   06/23/2022 12 5 - 18 mmol/L Final     Glucose   Date Value Ref Range Status   06/23/2022 139 (H) 70 - 125 mg/dL Final     Urea Nitrogen   Date  "Value Ref Range Status   06/23/2022 18 8 - 28 mg/dL Final     Creatinine   Date Value Ref Range Status   06/23/2022 0.90 0.70 - 1.30 mg/dL Final     GFR Estimate   Date Value Ref Range Status   06/23/2022 >90 >60 mL/min/1.73m2 Final     Comment:     Effective December 21, 2021 eGFRcr in adults is calculated using the 2021 CKD-EPI creatinine equation which includes age and gender (Roxana et al., [place], DOI: 10.[facility]6/[id number])   12/22/2020 >60 >60 mL/min/1.73m2 Final     Calcium   Date Value Ref Range Status   06/23/2022 9.6 8.5 - 10.5 mg/dL Final      2 mo ago   (6/23/22) 11 mo ago   (9/24/21) 1 yr ago   (5/19/21) 1 yr ago   (12/22/20) 2 yr ago   (7/7/20) 2 yr ago   (3/12/20)    Hemoglobin A1C 0.0 - 5.6 % 5.9 High   6.0 High  CM  6.1 High       Lab Results   Component Value Date    WBC 9.1 06/23/2022     Lab Results   Component Value Date    RBC 4.33 06/23/2022     Lab Results   Component Value Date    HGB 14.4 06/23/2022     Lab Results   Component Value Date    HCT 43.8 06/23/2022     No components found for: MCT  Lab Results   Component Value Date     06/23/2022     Lab Results   Component Value Date    MCH 33.3 06/23/2022     Lab Results   Component Value Date    MCHC 32.9 06/23/2022     Lab Results   Component Value Date    RDW 13.8 06/23/2022     Lab Results   Component Value Date     06/23/2022     Lab Results   Component Value Date    CHOL 175 09/24/2021     Lab Results   Component Value Date    HDL 53 09/24/2021     Lab Results   Component Value Date    LDL 91 09/24/2021     Lab Results   Component Value Date    TRIG 154 09/24/2021     Most recent EKG None    Substance Use History:  Christopher reports that he currently drinks about 8 glasses of wine per day, and has done so for the last 2 years.  Prior to that he drank about 4 glasses of wine per day for 20 years.  He denies ever having withdrawal symptoms and has never had a blackout.  He reports that alcohol \"makes him feel better, and he is more " "relaxed.\"  He enjoys drinking, but recognizes that he is drinking excessively.  He denies any illicit drug use.  He is a former smoker, and quit smoking cigarettes in .  He now vapes using very low nicotine content.  Social History     Tobacco Use     Smoking status: Former Smoker     Packs/day: 1.00     Years: 40.00     Pack years: 40.00     Quit date: 2014     Years since quittin.6     Smokeless tobacco: Never Used     Tobacco comment: still using e-cigs at lowest level, trying to quit   Substance Use Topics     Alcohol use: Yes     Alcohol/week: 56.0 standard drinks     Types: 56 Glasses of wine per week     Comment: Drinks 8 glasses of wine daily     Drug use: No      Caffeine: One cup of coffee per day.  He reports that he had an episode of supraventricular tachycardia after drinking a lot of caffeine on road trip, and was hospitalized and had to be cardioverted.  Since then he tries to limit himself to 1 caffeinated beverage per day.    Family History:   Patient reported family history includes:   Family History   Problem Relation Age of Onset     Pancreatic Cancer Mother 62.00     Depression Mother      Cancer Father 88.00        gallbladder     Diabetes Sister      Depression Sister      No Known Problems Brother      Heart Failure Maternal Grandmother          early 70s     Cerebrovascular Disease Maternal Grandfather          in 80s     Heart Failure Paternal Grandmother          in 90s     Coronary Artery Disease Paternal Grandfather         late 60s     Heart Failure Paternal Grandfather      No Known Problems Brother        Developmental History:  Not explored    Social History:   Per DA by Michelle Law [facility]:  Patient reported they grew up in [place].  They were raised by biological parents.  3 siblings.  Patient reported that   childhood was good.  Patient denies experiencing childhood abuse/neglect. Patient described their current relationships with family of origin as " "\"not that close to brothers\".  Sister passed away 2 years ago..       The patient has been  2 times and has 4 children.   He described the relationship with   spouse as, \"good.\" Patient reported having few good friends.    Patient reported   highest education level was graduate school. Retired pathologist.  The patient did not serve in the .  Patient is currently retired. Enjoys reading, music, and taking care of dog.    Mental Status Examination:     Christopher is a 71-year-old man who appears his stated age and in no acute distress.  He is neatly groomed in casual clothing.  Speech is clear and normal in rate and tone.  Eye contact is good over the video connection.  Motor behavior is appropriate.  Affect is blunted.  Mood is good.  Thoughts are logical and spontaneous with no loose associations or flight of ideas.  Thought content shows no psychosis.  No suicidal thoughts.    Sensorium is clear.  He is oriented to person, place, and time.  Memory appears to be intact for immediate, recent, and remote events.  Intelligence is estimated to be above average.  Abstractive ability appears to be intact.  Personal insight is good.  Personal and impersonal judgment appear to be intact.    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  He reports having had multiple episodes of depression beginning in his 30s.  Between episodes, he seems to achieve full remission.  His most recent episode was earlier this summer, and seems to have resolved with an increase in olanzapine.  He uses alcohol to excess, drinking about 8 glasses of wine per day.      ICD-10-CM    1. Alcohol use disorder, moderate, dependence (H)  F10.20 naltrexone (DEPADE/REVIA) 50 MG tablet     Adult Mental Health  Referral   2. Moderate episode of recurrent major depressive disorder (H)  F33.1 Adult Mental Health  Referral   3. Recurrent major depressive disorder, in full remission (H)  " F33.42        Medical Comorbidities Include: See above    Patient Strengths:   Christopher  identified the following strengths: commitment to health and well being, exercise routine, family support and intelligence.     Treatment Plan:  Christopher was referred to psychiatry for assistance in managing his recurrent depression.  He denies having any problematic symptoms of depression at this time, after having his medications adjusted at the end of June.  We agreed that he would continue with his current medications.    Risks and benefits of ongoing olanzapine use were reviewed, including risks of weight gain, elevated lipids, development of diabetes, and involuntary movements.  No involuntary movements were noted at this exam, which was limited due to being conducted by video.    Recommendations for patients prescribed olanzapine include regular monitoring of lipids, blood glucose, and screening for involuntary movements on at least an annual basis.    Christopher is concerned about his alcohol use, but in the contemplation stage of change.  He was open to at least exploring options for treatment.  A referral for addiction medicine was provided.  He was prescribed naltrexone to assist in reduction of alcohol use.  Because of his long history of daily alcohol use, detoxification under medical supervision was recommended if he chooses to discontinue alcohol use.    Patient Instructions   Start naltrexone 50 mg daily.    Please contact Addiction Medicine at [phone] when you are ready to schedule an appointment.    Continue all other medications per your primary care provider.    Per our conversation, you are being referred back to the care of your primary care provider.  All future refill requests should go to them.    It was been a pleasure to meet you, best wishes for the future!    [place] Resources:      Go to the Emergency Department as needed or call after hours crisis line at [phone].      To schedule individual or  family therapy, call [facility] at [phone].     Follow up with primary care provider as planned or sooner for acute medical concerns.    Call the psychiatric nurse line with medication questions or concerns at [phone].    MyChart may be used to communicate with your provider, but this is not intended to be used for emergencies.    Community Resources:      National Suicide Prevention Lifeline: 782.447.3789 (TTY: 700.561.3010). Call anytime for help.  (www.suicidepreventionlifeline.org)    National [place] on Mental Illness (www.brunilda.org): 706.293.5306 or 530-030-7646.     Mental Health Association (www.mentalhealth.org): 783.728.7352 or 353-169-5626.    [place] Crisis Text Line: Text [place] to 601471    Suicide LifeLine Chat: suicideUnreal Brandsline.org/chat       Patient Status:  The patient is being returned to the referring provider for ongoing care and medication prescribing.  The patient can be referred back to this service for further consultation as needed.     Video call duration: 50 minutes  Start: 10:42 AM  Stop: 11:32 AM  Total time spent, including chart review and documentation: 80 minutes

## 2022-08-30 NOTE — PROGRESS NOTES
Collaborative Care Psychiatry Service  Provider Name: Michelle Law, [facility], [place] [facility]    PATIENT'S NAME: Christopher Lindsey  PREFERRED NAME: Christopher  PREFERRED PRONOUNS:    MRN:   [id number]  :   1950   ACCT. NUMBER: [id number]  DATE OF SERVICE: 22  START TIME: 9:49a  END TIME: 10:21a    BRIEF ADULT DIAGNOSTIC ASSESSMENT    Telemedicine Visit: The patient's condition can be safely assessed and treated via synchronous audio and visual telemedicine encounter.      Reason for Telemedicine Visit: Services only offered telehealth    Originating Site (Patient Location): Patient's home    Distant Site (Provider Location): Provider Remote Setting- Home Office    Consent:  The patient/guardian has verbally consented to: the potential risks and benefits of telemedicine (video visit) versus in person care; bill my insurance or make self-payment for services provided; and responsibility for payment of non-covered services.     Mode of Communication:  Video Conference via [employer]    As the provider I attest to compliance with applicable laws and regulations related to telemedicine.    Identifying Information:  Patient is a 71 year old, .  The pronoun use throughout this assessment reflects the patient's chosen pronoun.  Patient was referred for an assessment by primary care provider.  Patient attended the session alone.     Chief Complaint:   Pt shares that he started with depression in his 30's and have continued off and on.  Has had medication changes throughout the years.  About 7 years ago he has a bad depressive episode and had a medication change at that time and found the medication changes made at that time very positive.  Did well for several years and In  of this year he has seen an increase in some symptoms.  Saw PCP 2 months ago and did have an increase in his zyprexa which has been somewhat helpful especially the past 2 weeks.  When feeling down will have thoughts of wishing he was dead or  wouldn't wake up but denies any thoughts or past hx of hurting himself.  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  He describes himself as an introvert and shares he really likes music, reading, and walking with his dog.  Admits that he drinks a lot of wine most days and scored a 3/4 on the CAGE-AID.  Pt shares that he hasn't been through treatment in the past.  He thinks that if he got serious about cutting back or quitting his drinking he could but admits that when he is feeling down it does help a bit with his mood.  Denies any other substance use.      Does the client have any condition that is currently presenting as a potential to harm themselves or others (severe withdrawal, serious medical condition, severe emotional/behavioral problem)? No.  Proceed with assessment.    Review of Symptoms per patient report:  Depression: Lack of interest, Change in energy level, Feelings of hopelessness and Feeling sad, down, or depressed  Rosemary:  No Symptoms  Psychosis: No Symptoms  Anxiety: No Symptoms  Panic:  No symptoms  Post Traumatic Stress Disorder:  No Symptoms   Eating Disorder: No Symptoms  ADD / ADHD:  No symptoms  Conduct Disorder: No symptoms  Autism Spectrum Disorder: No symptoms  Obsessive Compulsive Disorder: No Symptoms    Sleep: likes to sleep.  Doesn't have a hard time falling asleep.  Not too many problems with sleep.      Caffeine: 1 cup of coffee a day  Tobacco: vapes low nicotine content.  Quit cigarettes about 2014    Current alcohol use: drinks quite a bit of wine.  Shares that wife has pointed it out.    Current drug use: n/a        Rating Scales:  PHQ-9:   [facility] Follow-up to PHQ 9/24/2021 6/21/2022 8/30/2022   PHQ-9 9. Suicide Ideation past 2 weeks Several days Several days Not at all   Thoughts of suicide or self harm in past 2 weeks - No -   Thoughts of suicide or self harm in past 2 weeks - No -   PHQ-9 Safety concerns? - No -   PHQ-9 Safety concerns? - No -       GAD7:    IKE-7 SCORE 9/24/2021 6/23/2022 8/30/2022   Total Score 0 3 0     CGI:  First:  No data recorded  Most recent:  No data recorded    WHODAS:   WHODAS 2.0 Total Score 8/23/2022 8/23/2022   Total Score 19 19   Total Score MyChart - 19        CAGE:    CAGE-AID Flowsheet 8/23/2022 8/23/2022   Have you ever felt you should Cut down on your drinking or drug use? 1 1   Have people Annoyed you by criticizing your drinking or drug use? 1 1   Have you ever felt bad or Guilty about your drinking or drug use? 1 1   Have you ever had a drink or used drugs first thing in the morning to steady your nerves or to get rid of a hangover? (Eye opener) 0 0   CAGE-AID SCORE 3 3   1. Have you felt you ought to cut down on your drinking or drug use? - Yes   2. Have people annoyed you by criticizing your drinking or drug use? - Yes   3. Have you felt bad or guilty about your drinking or drug use? - Yes   4. Have you ever had a drink or used drugs first thing in the morning to steady your nerves or to get rid of a hangover (eye opener)? - No   CAGE-AID SCORE - 3 (A total score of 2 or greater is considered clinically significant)         Personal Medical History:  Past Medical History:   Diagnosis Date     Benign essential hypertension 12/4/2018     Elevated triglycerides with high cholesterol 12/4/2018     TERESA (obstructive sleep apnea) 12/4/2018    Has BIPAP machine     Other emphysema (H) 12/4/2018    Hx following with pulmonology, [facility]     Patellofemoral disorder of both knees 12/4/2018     Prediabetes 12/4/2018     Recurrent major depressive disorder, in full remission (H) 12/4/2018     SVT (supraventricular tachycardia) (H) 12/4/2018    Hx 2 episodes in past year, now on diltiazem for this       Patient has received mental health services in the past: psychiatry with past providers. .  Psychiatric Hospitalizations: None.  Patient denies a history of civil commitment. Currently, patient is not receiving other mental health  services.  These include none.     Patient does not report a history of head injury / trauma / cognitive impairment / seizures.      Current Medications:  Current Outpatient Medications   Medication Sig Dispense Refill     allopurinol (ZYLOPRIM) 100 MG tablet [ALLOPURINOL (ZYLOPRIM) 100 MG TABLET] TAKE 1 TABLET BY MOUTH EVERY DAY 90 tablet 3     atorvastatin (LIPITOR) 20 MG tablet [ATORVASTATIN (LIPITOR) 20 MG TABLET] TAKE 1 TABLET BY MOUTH EVERYDAY AT BEDTIME 90 tablet 3     buPROPion (WELLBUTRIN XL) 150 MG 24 hr tablet [BUPROPION (WELLBUTRIN XL) 150 MG 24 HR TABLET] TAKE 1 TABLET BY MOUTH EVERY DAY 90 tablet 3     diltiazem ER COATED BEADS (CARDIZEM CD/CARTIA XT) 180 MG 24 hr capsule [DILTIAZEM (CARDIZEM CD) 180 MG 24 HR CAPSULE] TAKE 1 CAPSULE BY MOUTH EVERY DAY 90 capsule 3     fenofibrate (TRICOR) 48 MG tablet TAKE 1 TABLET BY MOUTH EVERY DAY 90 tablet 3     FLUoxetine (PROZAC) 20 MG capsule [FLUOXETINE (PROZAC) 20 MG CAPSULE] TAKE 1 CAPSULE BY MOUTH EVERY DAY 90 capsule 3     losartan (COZAAR) 50 MG tablet Take 1 tablet (50 mg) by mouth daily 90 tablet 3     OLANZapine (ZYPREXA) 5 MG tablet Take 2 tablets (10 mg) by mouth daily 90 tablet 3        Allergies:  No Known Allergies    Family Psychiatric History:  Patient did report a family history of mental health concerns.  Son with depression as well    Family History     Problem (# of Occurrences) Relation (Name,Age of Onset)    Cancer (1) Father (88.00): gallbladder    Cerebrovascular Disease (1) Maternal Grandfather:  in 80s    Coronary Artery Disease (1) Paternal Grandfather: late 60s    Depression (2) Mother, Sister    Diabetes (1) Sister    Heart Failure (3) Maternal Grandmother:  early 70s, Paternal Grandmother:  in 90s, Paternal Grandfather    No Known Problems (2) Brother, Brother    Pancreatic Cancer (1) Mother (62.00)          Social/Family History:  Patient reported they grew up in [place].  They were raised by biological parents.  3  "siblings.  Patient reported that   childhood was good.  Patient denies experiencing childhood abuse/neglect. Patient described their current relationships with family of origin as \"not that close to brothers\".  Sister passed away 2 years ago..      The patient has been  2 times and has 4 children.   He described the relationship with   spouse as, \"good.\" Patient reported having few good friends.     Cultural influences and impact on patient's life structure, values, norms, and healthcare: pt denies. Patient identified their preferred language to be English. Patient reported they does not need the assistance of an  or other support involved in treatment.       Educational/Occupational History:  Patient reported   highest education level was graduate school. Retired pathologist.  The patient did not serve in the .  Patient is currently retired. Enjoys reading, music, and taking care of dog.      Social History     Socioeconomic History     Marital status:      Spouse name: Not on file     Number of children: Not on file     Years of education: Not on file     Highest education level: Not on file   Occupational History     Not on file   Tobacco Use     Smoking status: Former Smoker     Packs/day: 1.00     Years: 40.00     Pack years: 40.00     Quit date: 2014     Years since quittin.6     Smokeless tobacco: Never Used     Tobacco comment: still using e-cigs at lowest level, trying to quit   Substance and Sexual Activity     Alcohol use: Yes     Drug use: No     Sexual activity: Not on file   Other Topics Concern     Not on file   Social History Narrative     Not on file     Social Determinants of Health     Financial Resource Strain: Not on file   Food Insecurity: Not on file   Transportation Needs: Not on file   Physical Activity: Not on file   Stress: Not on file   Social Connections: Not on file   Intimate Partner Violence: Not on file   Housing Stability: Not on file "       Patient reported that they have not been involved with the legal system.   Patient denies being on probation / parole / under the jurisdiction of the court.    Current Mental Status Exam:   Appearance:   Appropriate    Eye Contact:   Good   Psychomotor:   Normal   Attitude / Demeanor:  Cooperative   Speech      Rate / Production:  Normal/ Responsive      Volume:   Normal  volume      Language:   intact  Mood:    Normal  Affect:    Appropriate    Thought Content:  Clear   Thought Process:  Coherent       Associations:  No loosening of associations  Insight:    Good   Judgment:   Intact   Orientation:   All  Attention/concentration: Good      Safety Assessment:   Current Safety Concerns:  Johnson Suicide Severity Rating Scale (Lifetime/Recent)  Johnson Suicide Severity Rating (Lifetime/Recent) 8/30/2022   1. Wish to be Dead (Lifetime) 1   1. Wish to be Dead (Past 1 Month) 1   Wish to be Dead Description (Past 1 Month) better the past 2 weeks   2. Non-Specific Active Suicidal Thoughts (Lifetime) 0   Most Severe Ideation Rating (Lifetime) 5   Most Severe Ideation Rating (Past 1 Month) 2   Frequency (Lifetime) 4   Frequency (Past 1 Month) 1   Duration (Lifetime) 1   Duration (Past 1 Month) 1   Controllability (Lifetime) 5   Controllability (Past 1 Month) 5   Deterrents (Lifetime) 1   Deterrents (Past 1 Month) 1   Reasons for Ideation (Lifetime) 4   Reasons for Ideation (Past 1 Month) 4   Actual Attempt (Lifetime) 0   Has subject engaged in non-suicidal self-injurious behavior? (Lifetime) 0   Interrupted Attempts (Lifetime) 0   Aborted or Self-Interrupted Attempt (Lifetime) 0   Preparatory Acts or Behavior (Lifetime) 0   Calculated C-SSRS Risk Score (Lifetime/Recent) Low Risk     Patient denies current homicidal ideation and behaviors.  Patient denies current self-injurious ideation and behaviors.    Patient denied risk behaviors associated with substance use.  Patient denies any high risk behaviors associated  with mental health symptoms.  Patient reports the following current concerns for their personal safety: None.  Patient reports there no firearms in the house. There are no firearms in the home..     History of Safety Concerns:  Patient denied a history of homicidal ideation.     Patient denied a history of personal safety concerns.    Patient denied a history of assaultive behaviors.    Patient denied a history of sexual assault behaviors.     Patient denied a history of risk behaviors associated with substance use.  Patient denies any history of high risk behaviors associated with mental health symptoms.  Patient reports the following protective factors: positive relationships positive family connections, dedication to family/friends, adherence with prescribed medication and living with other people    Risk Plan:  See Preliminary Treatment Plan for Safety and Risk Management Plan    Diagnosis:  Diagnostic Criteria:   Major Depressive Disorder  CRITERIA (A-C) REPRESENT A MAJOR DEPRESSIVE EPISODE - SELECT THESE CRITERIA  A) Recurrent episode(s) - symptoms have been present during the same 2-week period and represent a change from previous functioning 5 or more symptoms (required for diagnosis)   - Depressed mood. Note: In children and adolescents, can be irritable mood.     - Diminished interest or pleasure in all, or almost all, activities.    - Psychomotor activity retardation.    - Fatigue or loss of energy.    - Feelings of worthlessness or excessive guilt.   B) The symptoms cause clinically significant distress or impairment in social, occupational, or other important areas of functioning  C) The episode is not attributable to the physiological effects of a substance or to another medical condition  D) The occurence of major depressive episode is not better explained by other thought / psychotic disorders  E) There has never been a manic episode or hypomanic episode    Diagnoses:  1. Mild episode of recurrent  major depressive disorder (H)    2. Alcohol abuse        Patient's Strengths and Limitations:  Patient identified the following strengths or resources that will help them succeed in treatment: family support and intelligence. Things that may interfere with the patient's success in treatment include: none identified.     Recommendations:     1. Plan for Safety and Risk Management:Recommended that patient call 911 or go to the local ED should there be a change in any of these risk factors..  Report to child / adult protection services was n/a.      2. Resources/Service Plan:       services are not indicated.     Modifications to assist communication are not indicated.     Additional disability accommodations are not indicated.      3. Collaboration:  Collaboration / coordination of treatment will be initiated with the following support professionals: psychiatry.      4.  Referrals:   The following referral(s) will be initiated: pt was given information for Addiction Medicine if he is interested..       Staff Name/Credentials:  MYLES Gallardo, [facility]  August 30, 2022

## 2022-09-25 ENCOUNTER — HEALTH MAINTENANCE LETTER (OUTPATIENT)
Age: 72
End: 2022-09-25

## 2022-09-30 ENCOUNTER — VIRTUAL VISIT (OUTPATIENT)
Dept: FAMILY MEDICINE | Facility: CLINIC | Age: 72
End: 2022-09-30
Payer: MEDICARE

## 2022-09-30 DIAGNOSIS — U07.1 INFECTION DUE TO 2019 NOVEL CORONAVIRUS: Primary | ICD-10-CM

## 2022-09-30 PROBLEM — F10.20 ALCOHOL DEPENDENCE (H): Status: ACTIVE | Noted: 2022-09-30

## 2022-09-30 PROCEDURE — 99441 PR PHYSICIAN TELEPHONE EVALUATION 5-10 MIN: CPT | Mod: 95 | Performed by: FAMILY MEDICINE

## 2022-09-30 NOTE — PROGRESS NOTES
Christopher is a 71 year old who is being evaluated via a billable telephone visit.      What phone number would you like to be contacted at? [phone]   How would you like to obtain your AVS? MyChart    Assessment & Plan     (U07.1) Infection due to 2019 novel coronavirus  (primary encounter diagnosis)  Comment: pt started mild dry cough and mild sore throat today and home covid test positive. His wife was covid positive several days before and she is taking mediation and doing well. Pt denies sob, wheezing, fever and wheezing. He had covid vaccine x 5 times. He is former smoker. He has copd but not on any inhalers.   Plan: nirmatrelvir and ritonavir (PAXLOVID) therapy         pack        We discussed about supportive care of taking otc cough medication and tylenol for fever prn. Rest and drink water. Self quarantine 5 days. Side effect of medication addressed. Advise to call me or go to er if his symptoms worse.          Return if symptoms worsen or fail to improve.    Emily Barroso MD  [facility]   Christopher is a 71 year old, presenting for the following health issues:  Covid Concern (Tested positive this morning, Sore throat, cough.)      HPI       COVID-19 Symptom Review  How many days ago did these symptoms start? today    Are any of the following symptoms significant for you?    New or worsening difficulty breathing? No    Worsening cough?   Mild dry cough  started today.      Fever or chills? No    Headache: No    Sore throat: YES    Chest pain: No    Diarrhea: No    Body aches? No    What treatments has patient tried? none   Does patient live in a nursing home, group home, or shelter? No  Does patient have a way to get food/medications during quarantined? Yes, I have a friend or family member who can help me.             Review of Systems   Constitutional, HEENT, cardiovascular, pulmonary, gi and gu systems are negative, except as otherwise noted.      Objective            Vitals:  No vitals were obtained today due to virtual visit.    Physical Exam   healthy, alert and no distress  PSYCH: Alert and oriented times 3; coherent speech, normal   rate and volume, able to articulate logical thoughts, able   to abstract reason, no tangential thoughts, no hallucinations   or delusions  His affect is normal  RESP: No cough, no audible wheezing, able to talk in full sentences  Remainder of exam unable to be completed due to telephone visits           Phone call duration: 5 minutes

## 2022-10-09 ENCOUNTER — NURSE TRIAGE (OUTPATIENT)
Dept: NURSING | Facility: CLINIC | Age: 72
End: 2022-10-09

## 2022-10-09 NOTE — TELEPHONE ENCOUNTER
Covid  Positive 09/30 completed Paxlovid.  Now 9 days later has mild symptoms and testing positive again; inquiring about  Paxlovid again.   Triage protocol reviewied   Non specific  advisement available beyond CDC  Advisory dated 05/22; patient would like to discuss with a provider;  will schedule  VV.    Reviewed  Home care, isolation/quarantine and red flag symptoms   Caller understands and will comply   Chelsey DEGROOT          Reason for Disposition    HIGH RISK for severe COVID complications (e.g., weak immune system, age > 64 years, obesity with BMI > 25, pregnant, chronic lung disease or other chronic medical condition)  (Exception: Already seen by PCP and no new or worsening symptoms.)    Additional Information    Negative: SEVERE difficulty breathing (e.g., struggling for each breath, speaks in single words)    Negative: Difficult to awaken or acting confused (e.g., disoriented, slurred speech)    Negative: Bluish (or gray) lips or face now    Negative: Shock suspected (e.g., cold/pale/clammy skin, too weak to stand, low BP, rapid pulse)    Negative: Sounds like a life-threatening emergency to the triager    Negative: [1] Diagnosed or suspected COVID-19 AND [2] symptoms lasting 3 or more weeks    Negative: [1] COVID-19 exposure AND [2] no symptoms    Negative: COVID-19 vaccine reaction suspected (e.g., fever, headache, muscle aches) occurring 1 to 3 days after getting vaccine    Negative: COVID-19 vaccine, questions about    Negative: [1] Lives with someone known to have influenza (flu test positive) AND [2] flu-like symptoms (e.g., cough, runny nose, sore throat, SOB; with or without fever)    Negative: [1] Adult with possible COVID-19 symptoms AND [2] triager concerned about severity of symptoms or other causes    Negative: COVID-19 and breastfeeding, questions about    Negative: SEVERE or constant chest pain or pressure  (Exception: Mild central chest pain, present only when coughing.)     Negative: MODERATE difficulty breathing (e.g., speaks in phrases, SOB even at rest, pulse 100-120)    Negative: [1] Headache AND [2] stiff neck (can't touch chin to chest)    Negative: Oxygen level (e.g., pulse oximetry) 90 percent or lower    Negative: Chest pain or pressure    Negative: Patient sounds very sick or weak to the triager    Negative: MILD difficulty breathing (e.g., minimal/no SOB at rest, SOB with walking, pulse <100)    Negative: Fever > 103 F (39.4 C)    Negative: [1] Fever > 101 F (38.3 C) AND [2] age > 60 years    Negative: [1] Fever > 100.0 F (37.8 C) AND [2] bedridden (e.g., nursing home patient, CVA, chronic illness, recovering from surgery)    Protocols used: CORONAVIRUS (COVID-19) DIAGNOSED OR [id number] 1.18.2022

## 2022-10-10 ENCOUNTER — VIRTUAL VISIT (OUTPATIENT)
Dept: PEDIATRICS | Facility: CLINIC | Age: 72
End: 2022-10-10
Payer: MEDICARE

## 2022-10-10 DIAGNOSIS — I10 BENIGN ESSENTIAL HYPERTENSION: ICD-10-CM

## 2022-10-10 DIAGNOSIS — F33.42 RECURRENT MAJOR DEPRESSIVE DISORDER, IN FULL REMISSION (H): ICD-10-CM

## 2022-10-10 DIAGNOSIS — U07.1 INFECTION DUE TO 2019 NOVEL CORONAVIRUS: Primary | ICD-10-CM

## 2022-10-10 DIAGNOSIS — J43.8 OTHER EMPHYSEMA (H): ICD-10-CM

## 2022-10-10 DIAGNOSIS — J98.4 RESTRICTIVE LUNG DISEASE: ICD-10-CM

## 2022-10-10 PROCEDURE — 99214 OFFICE O/P EST MOD 30 MIN: CPT | Mod: CS | Performed by: INTERNAL MEDICINE

## 2022-10-10 NOTE — PROGRESS NOTES
Christopher is a 72 year old who is being evaluated via a billable video visit.      How would you like to obtain your AVS? MyChart  If the video visit is dropped, the invitation should be resent by: text  Will anyone else be joining your video visit? No          Assessment & Plan     (U07.1) Infection due to 2019 novel coronavirus  (primary encounter diagnosis)  Comment: Recent positive COVID test on 10/9 likely represents rebound viral replication and continuation of symptoms associated with initial COVID infection which started on 9/29.  Given his underlying chronic lung disease, did recommend a repeat 5-day course of paxlovid.  Drug mechanism of action and Side effects reviewed,  drug interactions reviewed-patient will stop atorvastatin while on the antiviral and will monitor his blood pressure during the 5-day course.  Recommended rest fluids and symptomatic cares --expect symptoms to improve on the second course of paxlovid, may have some lingering mild symptoms following that   Plan: nirmatrelvir and ritonavir (PAXLOVID) therapy         pack          (J98.4) Restrictive lung disease  (J43.8) Other emphysema (H)  Comment:   Plan: History of restrictive lung disease and COPD, stable    (F33.42) Recurrent major depressive disorder, in full remission (H)  Comment:   Plan: Medication interactions reviewed- okay to continue current regimen without changes during antiviral course    (I10) Benign essential hypertension  Comment:   Plan: Patient will monitor blood pressure over the next 5 days    Jonn Chaudhry MD  [facility]        Randi White is a 72 year old, presenting for the following health issues:    72-year-old male with a history of COPD and restrictive lung disease presents today for video visit regarding COVID-19.  Patient will travel to [place] with his wife in September, returned to [place] on 9/24.  He has a history of 5 Pfizer vaccinations for COVID including the most recent  bivalent booster.  Both he and his wife developed sore throat and mild respiratory symptoms upon returning to [place].  Patient tested positive for COVID by home test on 9/29-he was treated with Paxlovid starting on 9/30.  He tolerated the antiviral without difficulty and symptoms improved.  However developed recurrent sore throat and nasal congestion recently and then retested on 10/9, and again tested positive.  Has questions about whether this represents a new COVID infection or not.  Denies chest pain shortness of breath or worsening cough or fever.    Patient Active Problem List   Diagnosis     Elevated triglycerides with high cholesterol     Benign essential hypertension     Other emphysema (H)     Recurrent major depressive disorder, in full remission (H)     SVT (supraventricular tachycardia) (H)     TERESA (obstructive sleep apnea)     Bilateral primary osteoarthritis of knee     Obesity (BMI 35.0-39.9) with comorbidity (H)     Obesity hypoventilation syndrome (H)     Restrictive lung disease     Hearing problem of both ears     Multiple thyroid nodules     Abnormal liver ultrasound     Hx of colonic polyps     Hx of gout     Type 2 diabetes mellitus without complication, without long-term current use of insulin (H)     Alcohol dependence (H)     Current Outpatient Medications   Medication Sig Dispense Refill     allopurinol (ZYLOPRIM) 100 MG tablet [ALLOPURINOL (ZYLOPRIM) 100 MG TABLET] TAKE 1 TABLET BY MOUTH EVERY DAY 90 tablet 3     atorvastatin (LIPITOR) 20 MG tablet [ATORVASTATIN (LIPITOR) 20 MG TABLET] TAKE 1 TABLET BY MOUTH EVERYDAY AT BEDTIME 90 tablet 3     buPROPion (WELLBUTRIN XL) 150 MG 24 hr tablet [BUPROPION (WELLBUTRIN XL) 150 MG 24 HR TABLET] TAKE 1 TABLET BY MOUTH EVERY DAY 90 tablet 3     diltiazem ER COATED BEADS (CARDIZEM CD/CARTIA XT) 180 MG 24 hr capsule [DILTIAZEM (CARDIZEM CD) 180 MG 24 HR CAPSULE] TAKE 1 CAPSULE BY MOUTH EVERY DAY 90 capsule 3     fenofibrate (TRICOR) 48 MG tablet  TAKE 1 TABLET BY MOUTH EVERY DAY 90 tablet 3     FLUoxetine (PROZAC) 20 MG capsule [FLUOXETINE (PROZAC) 20 MG CAPSULE] TAKE 1 CAPSULE BY MOUTH EVERY DAY 90 capsule 3     losartan (COZAAR) 50 MG tablet Take 1 tablet (50 mg) by mouth daily 90 tablet 3     naltrexone (DEPADE/REVIA) 50 MG tablet Take 1 tablet (50 mg) by mouth daily 30 tablet 2     nirmatrelvir and ritonavir (PAXLOVID) therapy pack Take 3 tablets by mouth 2 times daily for 5 days (Take 2 Nirmatrelvir tablets and 1 Ritonavir tablet twice daily for 5 days) 30 each 0     OLANZapine (ZYPREXA) 5 MG tablet Take 2 tablets (10 mg) by mouth daily 90 tablet 3            Objective           Vitals:  No vitals were obtained today due to virtual visit.    Physical Exam   GENERAL: Healthy, alert and no distress  EYES: Eyes grossly normal to inspection.  No discharge or erythema, or obvious scleral/conjunctival abnormalities.  RESP: No audible wheeze, cough, or visible cyanosis.  No visible retractions or increased work of breathing.    SKIN: Visible skin clear. No significant rash, abnormal pigmentation or lesions.  NEURO: Cranial nerves grossly intact.  Mentation and speech appropriate for age.  PSYCH: Mentation appears normal, affect normal/bright, judgement and insight intact, normal speech and appearance well-groomed.        Video-Visit Details    Video Start Time: 11:28 AM    Type of service:  Video Visit    Video End Time:11:50 AM    Originating Location (pt. Location): Home    Distant Location (provider location):  [facility]     Platform used for Video Visit: [employer]

## 2023-01-30 ENCOUNTER — HEALTH MAINTENANCE LETTER (OUTPATIENT)
Age: 73
End: 2023-01-30

## 2023-03-02 ENCOUNTER — OFFICE VISIT (OUTPATIENT)
Dept: FAMILY MEDICINE | Facility: CLINIC | Age: 73
End: 2023-03-02
Payer: MEDICARE

## 2023-03-02 VITALS
DIASTOLIC BLOOD PRESSURE: 83 MMHG | TEMPERATURE: 97.8 F | OXYGEN SATURATION: 93 % | BODY MASS INDEX: 39.4 KG/M2 | HEART RATE: 80 BPM | HEIGHT: 68 IN | SYSTOLIC BLOOD PRESSURE: 138 MMHG | WEIGHT: 260 LBS | RESPIRATION RATE: 29 BRPM

## 2023-03-02 DIAGNOSIS — G25.81 RESTLESS LEGS: ICD-10-CM

## 2023-03-02 DIAGNOSIS — K62.5 RECTAL BLEEDING: ICD-10-CM

## 2023-03-02 DIAGNOSIS — J43.8 OTHER EMPHYSEMA (H): ICD-10-CM

## 2023-03-02 DIAGNOSIS — E66.01 MORBID OBESITY (H): ICD-10-CM

## 2023-03-02 DIAGNOSIS — R06.02 SHORTNESS OF BREATH: ICD-10-CM

## 2023-03-02 DIAGNOSIS — R60.9 EDEMA, UNSPECIFIED TYPE: Primary | ICD-10-CM

## 2023-03-02 DIAGNOSIS — E11.9 TYPE 2 DIABETES MELLITUS WITHOUT COMPLICATION, WITHOUT LONG-TERM CURRENT USE OF INSULIN (H): ICD-10-CM

## 2023-03-02 DIAGNOSIS — F10.20 UNCOMPLICATED ALCOHOL DEPENDENCE (H): ICD-10-CM

## 2023-03-02 DIAGNOSIS — R25.2 LEG CRAMPS: ICD-10-CM

## 2023-03-02 LAB
ALBUMIN SERPL BCG-MCNC: 4.2 G/DL (ref 3.5–5.2)
ALP SERPL-CCNC: 100 U/L (ref 40–129)
ALT SERPL W P-5'-P-CCNC: 36 U/L (ref 10–50)
ANION GAP SERPL CALCULATED.3IONS-SCNC: 14 MMOL/L (ref 7–15)
AST SERPL W P-5'-P-CCNC: 37 U/L (ref 10–50)
BILIRUB SERPL-MCNC: 0.3 MG/DL
BUN SERPL-MCNC: 11.3 MG/DL (ref 8–23)
CALCIUM SERPL-MCNC: 9.5 MG/DL (ref 8.8–10.2)
CHLORIDE SERPL-SCNC: 100 MMOL/L (ref 98–107)
CREAT SERPL-MCNC: 0.75 MG/DL (ref 0.67–1.17)
DEPRECATED HCO3 PLAS-SCNC: 26 MMOL/L (ref 22–29)
ERYTHROCYTE [DISTWIDTH] IN BLOOD BY AUTOMATED COUNT: 13.1 % (ref 10–15)
FERRITIN SERPL-MCNC: 47 NG/ML (ref 31–409)
GFR SERPL CREATININE-BSD FRML MDRD: >90 ML/MIN/1.73M2
GLUCOSE SERPL-MCNC: 173 MG/DL (ref 70–99)
HBA1C MFR BLD: 6.5 % (ref 0–5.6)
HCT VFR BLD AUTO: 44.6 % (ref 40–53)
HGB BLD-MCNC: 14.4 G/DL (ref 13.3–17.7)
IRON BINDING CAPACITY (ROCHE): 528 UG/DL (ref 240–430)
IRON SATN MFR SERPL: 15 % (ref 15–46)
IRON SERPL-MCNC: 78 UG/DL (ref 61–157)
MAGNESIUM SERPL-MCNC: 2.2 MG/DL (ref 1.7–2.3)
MCH RBC QN AUTO: 32.2 PG (ref 26.5–33)
MCHC RBC AUTO-ENTMCNC: 32.3 G/DL (ref 31.5–36.5)
MCV RBC AUTO: 100 FL (ref 78–100)
NT-PROBNP SERPL-MCNC: 8 PG/ML (ref 0–900)
PLATELET # BLD AUTO: 181 10E3/UL (ref 150–450)
POTASSIUM SERPL-SCNC: 4.2 MMOL/L (ref 3.4–5.3)
PROT SERPL-MCNC: 7.4 G/DL (ref 6.4–8.3)
RBC # BLD AUTO: 4.47 10E6/UL (ref 4.4–5.9)
SODIUM SERPL-SCNC: 140 MMOL/L (ref 136–145)
VIT B12 SERPL-MCNC: 328 PG/ML (ref 232–1245)
WBC # BLD AUTO: 7.2 10E3/UL (ref 4–11)

## 2023-03-02 PROCEDURE — 80053 COMPREHEN METABOLIC PANEL: CPT | Performed by: NURSE PRACTITIONER

## 2023-03-02 PROCEDURE — 36415 COLL VENOUS BLD VENIPUNCTURE: CPT | Performed by: NURSE PRACTITIONER

## 2023-03-02 PROCEDURE — 83880 ASSAY OF NATRIURETIC PEPTIDE: CPT | Performed by: NURSE PRACTITIONER

## 2023-03-02 PROCEDURE — 99214 OFFICE O/P EST MOD 30 MIN: CPT | Performed by: NURSE PRACTITIONER

## 2023-03-02 PROCEDURE — 83550 IRON BINDING TEST: CPT | Performed by: NURSE PRACTITIONER

## 2023-03-02 PROCEDURE — 83036 HEMOGLOBIN GLYCOSYLATED A1C: CPT | Performed by: NURSE PRACTITIONER

## 2023-03-02 PROCEDURE — 82607 VITAMIN B-12: CPT | Performed by: NURSE PRACTITIONER

## 2023-03-02 PROCEDURE — 85027 COMPLETE CBC AUTOMATED: CPT | Performed by: NURSE PRACTITIONER

## 2023-03-02 PROCEDURE — 83540 ASSAY OF IRON: CPT | Performed by: NURSE PRACTITIONER

## 2023-03-02 PROCEDURE — 82728 ASSAY OF FERRITIN: CPT | Performed by: NURSE PRACTITIONER

## 2023-03-02 PROCEDURE — 83735 ASSAY OF MAGNESIUM: CPT | Performed by: NURSE PRACTITIONER

## 2023-03-02 ASSESSMENT — PATIENT HEALTH QUESTIONNAIRE - PHQ9
10. IF YOU CHECKED OFF ANY PROBLEMS, HOW DIFFICULT HAVE THESE PROBLEMS MADE IT FOR YOU TO DO YOUR WORK, TAKE CARE OF THINGS AT HOME, OR GET ALONG WITH OTHER PEOPLE: NOT DIFFICULT AT ALL
SUM OF ALL RESPONSES TO PHQ QUESTIONS 1-9: 2
SUM OF ALL RESPONSES TO PHQ QUESTIONS 1-9: 2

## 2023-03-02 ASSESSMENT — PAIN SCALES - GENERAL: PAINLEVEL: NO PAIN (0)

## 2023-03-02 NOTE — PROGRESS NOTES
Assessment and Plan:     Edema, unspecified type  We will rule out renal insufficiency/failure and heart failure.  This is likely positional as he does sit for most of the day.  Recommend he change positions frequently.  He will continue to wear compression socks.  I am reluctant to prescribe a diuretic as he has a history of gout and takes allopurinol.  - Comprehensive metabolic panel (BMP + Alb, Alk Phos, ALT, AST, Total. Bili, TP)  - BNP-N terminal pro  - Comprehensive metabolic panel (BMP + Alb, Alk Phos, ALT, AST, Total. Bili, TP)  - BNP-N terminal pro    Restless legs  We will rule out iron deficiency and electrolyte imbalance.  We discussed that he may be experiencing poor circulation due to alcohol dependence.  Discussed the importance of good hydration.  - Ferritin  - Iron and iron binding capacity  - CBC with platelets  - Magnesium  - Vitamin B12  - Ferritin  - Iron and iron binding capacity  - CBC with platelets  - Magnesium  - Vitamin B12    Leg cramps  We will rule out electrolyte imbalance.  Discussed the importance of good hydration.    Shortness of breath  He has known emphysema.  He experiences dyspnea on exertion.  We will rule out heart failure due to new onset of extremity edema.  - BNP-N terminal pro  - BNP-N terminal pro    Other emphysema (H)    Rectal bleeding  Patient has been experiencing rectal bleeding due to external rectal hemorrhoids.  He defers examination today.  We will rule out iron deficiency.  Offered Proctosol cream, but he declines.  He plans on taking fiber supplementation to improve his constipation.  - Ferritin  - Iron and iron binding capacity  - Ferritin  - Iron and iron binding capacity    Type 2 diabetes mellitus without complication, without long-term current use of insulin (H)  We will check A1c today.  - HEMOGLOBIN A1C    Uncomplicated alcohol dependence (H)  Offered referral to treatment, but he declines.  - Ferritin  - Iron and iron binding capacity  - CBC with  platelets  - Magnesium  - Vitamin B12  - Ferritin  - Iron and iron binding capacity  - CBC with platelets  - Magnesium  - Vitamin B12    Morbid obesity (H)  This is contributing to type 2 diabetes.  Recommend consuming a healthy diet and exercising.        Subjective:     Christopher is a 72 year old male presenting to the clinic for concerns for lower extremity edema.  Patient has multiple comorbidities including type 2 diabetes, restrictive lung disease, obesity, TERESA, SVT, depression, emphysema, hypertension, hyperlipidemia.  Patient has been experiencing bilateral lower extremity edema in the evenings for 2 months.  He has noticed this more with wearing crew socks.  He is now wearing knee-high compression socks which is providing assistance.  He experiences restless legs at least 3 times per week.  He has been using pneumatic boots which is providing assistance.  He mostly sits throughout the day.  He has not noticed redness, swelling of his lower extremities.  He has emphysema and experiences shortness of breath with exertion.  He does have some bruising on his bilateral upper extremities.  He has not traveled recently.  He denies any personal or family history of blood clots.  No pain has been present.  He does consume 8 to 10 glasses of wine daily.  He does not want to seek treatment.  He has been experiencing rectal bleeding from external hemorrhoids.  He has seen gastroenterology in the past.  He suffers from constipation.  He plans on starting some fiber supplementation.    Reviewof Systems: A complete 14 point review of systems was obtained and is negative or as stated in the history of present illness.    Social History     Socioeconomic History     Marital status:      Spouse name: Not on file     Number of children: Not on file     Years of education: Not on file     Highest education level: Not on file   Occupational History     Not on file   Tobacco Use     Smoking status: Former     Packs/day: 1.00      Years: 40.00     Pack years: 40.00     Types: Cigarettes     Quit date: 2014     Years since quittin.1     Smokeless tobacco: Never     Tobacco comments:     still using e-cigs at lowest level, trying to quit   Substance and Sexual Activity     Alcohol use: Yes     Alcohol/week: 56.0 standard drinks     Types: 56 Glasses of wine per week     Comment: Drinks 8 glasses of wine daily     Drug use: No     Sexual activity: Not on file   Other Topics Concern     Not on file   Social History Narrative     Not on file     Social Determinants of Health     Financial Resource Strain: Not on file   Food Insecurity: Not on file   Transportation Needs: Not on file   Physical Activity: Not on file   Stress: Not on file   Social Connections: Not on file   Intimate Partner Violence: Not on file   Housing Stability: Not on file       Active Ambulatory Problems     Diagnosis Date Noted     Elevated triglycerides with high cholesterol 2018     Benign essential hypertension 2018     Other emphysema (H) 2018     Recurrent major depressive disorder, in full remission (H) 2018     SVT (supraventricular tachycardia) (H) 2018     TERESA (obstructive sleep apnea) 2018     Bilateral primary osteoarthritis of knee 2018     Obesity (BMI 35.0-39.9) with comorbidity (H) 2018     Obesity hypoventilation syndrome (H) 2019     Restrictive lung disease 2019     Hearing problem of both ears 2019     Multiple thyroid nodules 2019     Abnormal liver ultrasound 2019     Hx of colonic polyps 2019     Hx of gout 01/10/2019     Type 2 diabetes mellitus without complication, without long-term current use of insulin (H) 2020     Alcohol dependence (H) 2022     Resolved Ambulatory Problems     Diagnosis Date Noted     No Resolved Ambulatory Problems     Past Medical History:   Diagnosis Date     Patellofemoral disorder of both knees 2018      "Prediabetes 2018       Family History   Problem Relation Age of Onset     Pancreatic Cancer Mother 62.00     Depression Mother      Cancer Father 88.00        gallbladder     Diabetes Sister      Depression Sister      No Known Problems Brother      Heart Failure Maternal Grandmother          early 70s     Cerebrovascular Disease Maternal Grandfather          in 80s     Heart Failure Paternal Grandmother          in 90s     Coronary Artery Disease Paternal Grandfather         late 60s     Heart Failure Paternal Grandfather      No Known Problems Brother        Objective:     /83 (BP Location: Right arm, Patient Position: Sitting, Cuff Size: Adult Large)   Pulse 80   Temp 97.8  F (36.6  C) (Oral)   Resp 29   Ht 1.725 m (5' 7.91\")   Wt 117.9 kg (260 lb)   SpO2 93%   BMI 39.63 kg/m      Patient is alert, in no obvious distress.   Skin: Warm, dry.    Lungs:  Clear to auscultation. Respirations even and unlabored.  No wheezing or rales noted.   Heart:  Regular rate and rhythm.  No murmurs, S3, S4, gallops, or rubs.    Abdomen: Soft, nontender.  No organomegaly. Bowel sounds normoactive. No guarding or masses noted.   Musculoskeletal: No edema is present in bilateral lower extremities.              Answers for HPI/ROS submitted by the patient on 3/2/2023  If you checked off any problems, how difficult have these problems made it for you to do your work, take care of things at home, or get along with other people?: Not difficult at all  PHQ9 TOTAL SCORE: 2  How many servings of fruits and vegetables do you eat daily?: 2-3  On average, how many sweetened beverages do you drink each day (Examples: soda, juice, sweet tea, etc.  Do NOT count diet or artificially sweetened beverages)?: 0  How many minutes a day do you exercise enough to make your heart beat faster?: 10 to 19  How many days a week do you exercise enough to make your heart beat faster?: 3 or less  How many days per week do you miss " taking your medication?: 0  What is the reason for your visit today?: restlss legs  When did your symptoms begin?: More than a month

## 2023-03-05 DIAGNOSIS — I47.10 SVT (SUPRAVENTRICULAR TACHYCARDIA) (H): ICD-10-CM

## 2023-03-05 DIAGNOSIS — M1A.00X0 IDIOPATHIC CHRONIC GOUT WITHOUT TOPHUS, UNSPECIFIED SITE: ICD-10-CM

## 2023-03-05 DIAGNOSIS — F33.42 RECURRENT MAJOR DEPRESSIVE DISORDER, IN FULL REMISSION (H): ICD-10-CM

## 2023-03-05 DIAGNOSIS — I10 BENIGN ESSENTIAL HYPERTENSION: ICD-10-CM

## 2023-03-06 RX ORDER — DILTIAZEM HYDROCHLORIDE 180 MG/1
CAPSULE, COATED, EXTENDED RELEASE ORAL
Qty: 90 CAPSULE | Refills: 3 | Status: SHIPPED | OUTPATIENT
Start: 2023-03-06 | End: 2024-03-01

## 2023-03-06 RX ORDER — BUPROPION HYDROCHLORIDE 150 MG/1
TABLET ORAL
Qty: 90 TABLET | Refills: 1 | Status: SHIPPED | OUTPATIENT
Start: 2023-03-06 | End: 2023-09-08

## 2023-03-06 RX ORDER — ALLOPURINOL 100 MG/1
TABLET ORAL
Qty: 90 TABLET | Refills: 1 | Status: SHIPPED | OUTPATIENT
Start: 2023-03-06 | End: 2023-09-01

## 2023-03-06 NOTE — TELEPHONE ENCOUNTER
"Routing refill request to provider for review/approval because:  Labs not current:  Uric Acid    Last Written Prescription Date:  3/9/22  Last Fill Quantity: 90,  # refills: 3   Last office visit provider:   3/2/22    Requested Prescriptions   Pending Prescriptions Disp Refills     buPROPion (WELLBUTRIN XL) 150 MG 24 hr tablet [Pharmacy Med Name: BUPROPION HCL ER (XL) 150MG TB24] 90 tablet 3     Sig: TAKE ONE TABLET BY MOUTH EVERY DAY       SSRIs Protocol Passed - 3/6/2023  2:26 PM        Passed - PHQ-9 score less than 5 in past 6 months     Please review last PHQ-9 score.           Passed - Medication is Bupropion     If the medication is Bupropion (Wellbutrin), and the patient is taking for smoking cessation; OK to refill.          Passed - Medication is active on med list        Passed - Patient is age 18 or older        Passed - Recent (6 mo) or future (30 days) visit within the authorizing provider's specialty     Patient had office visit in the last 6 months or has a visit in the next 30 days with authorizing provider or within the authorizing provider's specialty.  See \"Patient Info\" tab in inbasket, or \"Choose Columns\" in Meds & Orders section of the refill encounter.               diltiazem ER COATED BEADS (CARDIZEM CD/CARTIA XT) 180 MG 24 hr capsule [Pharmacy Med Name: DILTIAZEM ER  CAPS] 90 capsule 3     Sig: TAKE ONE CAPSULE BY MOUTH EVERY DAY       Calcium Channel Blockers Protocol  Passed - 3/6/2023  2:26 PM        Passed - Blood pressure under 140/90 in past 12 months     BP Readings from Last 3 Encounters:   03/02/23 138/83   06/23/22 130/82   09/24/21 120/80                 Passed - Normal ALT in past 12 months     Recent Labs   Lab Test 03/02/23  0942   ALT 36             Passed - Recent (12 mo) or future (30 days) visit within the authorizing provider's specialty     Patient has had an office visit with the authorizing provider or a provider within the authorizing providers department within " "the previous 12 mos or has a future within next 30 days. See \"Patient Info\" tab in inbasket, or \"Choose Columns\" in Meds & Orders section of the refill encounter.              Passed - Medication is active on med list        Passed - Patient is age 18 or older        Passed - Normal serum creatinine on file in past 12 months     Recent Labs   Lab Test 03/02/23  0942   CR 0.75       Ok to refill medication if creatinine is low             FLUoxetine (PROZAC) 20 MG capsule [Pharmacy Med Name: FLUOXETINE HCL 20MG CAPS] 90 capsule 3     Sig: TAKE ONE CAPSULE BY MOUTH EVERY DAY       SSRIs Protocol Passed - 3/6/2023  2:26 PM        Passed - PHQ-9 score less than 5 in past 6 months     Please review last PHQ-9 score.           Passed - Medication is Bupropion     If the medication is Bupropion (Wellbutrin), and the patient is taking for smoking cessation; OK to refill.          Passed - Medication is active on med list        Passed - Patient is age 18 or older        Passed - Recent (6 mo) or future (30 days) visit within the authorizing provider's specialty     Patient had office visit in the last 6 months or has a visit in the next 30 days with authorizing provider or within the authorizing provider's specialty.  See \"Patient Info\" tab in inbasket, or \"Choose Columns\" in Meds & Orders section of the refill encounter.               allopurinol (ZYLOPRIM) 100 MG tablet [Pharmacy Med Name: ALLOPURINOL 100MG TABS] 90 tablet 3     Sig: TAKE ONE TABLET BY MOUTH EVERY DAY       Gout Agents Protocol Failed - 3/6/2023  2:26 PM        Failed - Has Uric Acid on file in past 12 months and value is less than 6     Recent Labs   Lab Test 12/22/20  0907   URIC 6.3     If level is 6mg/dL or greater, ok to refill one time and refer to provider.           Passed - CBC on file in past 12 months     Recent Labs   Lab Test 03/02/23  0942   WBC 7.2   RBC 4.47   HGB 14.4   HCT 44.6                    Passed - ALT on file in past 12 " "months     Recent Labs   Lab Test 03/02/23  0942   ALT 36             Passed - Recent (12 mo) or future (30 days) visit within the authorizing provider's specialty     Patient has had an office visit with the authorizing provider or a provider within the authorizing providers department within the previous 12 mos or has a future within next 30 days. See \"Patient Info\" tab in inbasket, or \"Choose Columns\" in Meds & Orders section of the refill encounter.              Passed - Medication is active on med list        Passed - Patient is age 18 or older        Passed - Normal serum creatinine on file in the past 12 months     Recent Labs   Lab Test 03/02/23  0942   CR 0.75       Ok to refill medication if creatinine is low               NAVYA XAVIER RN 03/06/23 2:27 PM  "

## 2023-03-07 ENCOUNTER — TRANSFERRED RECORDS (OUTPATIENT)
Dept: HEALTH INFORMATION MANAGEMENT | Facility: CLINIC | Age: 73
End: 2023-03-07

## 2023-03-07 LAB — RETINOPATHY: NEGATIVE

## 2023-03-09 DIAGNOSIS — E78.2 ELEVATED TRIGLYCERIDES WITH HIGH CHOLESTEROL: ICD-10-CM

## 2023-03-10 RX ORDER — FENOFIBRATE 48 MG/1
48 TABLET, COATED ORAL DAILY
Qty: 90 TABLET | Refills: 3 | Status: SHIPPED | OUTPATIENT
Start: 2023-03-10 | End: 2024-03-29

## 2023-03-10 NOTE — TELEPHONE ENCOUNTER
Routing refill request to provider for review/approval because:  Labs not current:    Recent Labs   Lab Test 09/24/21  1119 05/19/21  1220   CHOL 175 178   HDL 53 57   LDL 91 82   TRIG 154* 194*       Pending Prescriptions:                       Disp   Refills    fenofibrate (TRICOR) 48 MG tablet          90 tab*3        Sig: Take 1 tablet (48 mg) by mouth daily    Last Written Prescription Date:  2/11/22  Last Fill Quantity: 90,  # refills: 3   Last office visitwith prescribing provider: 3/2/2023   Future Office Visit:  None scheduled    Meagan Alicia, CHRISTOPHERN, RN  [facility]

## 2023-03-14 DIAGNOSIS — E78.2 ELEVATED TRIGLYCERIDES WITH HIGH CHOLESTEROL: ICD-10-CM

## 2023-03-15 NOTE — TELEPHONE ENCOUNTER
"Routing refill request to provider for review/approval because:  Labs not current:  ldl    Last Written Prescription Date:  3/9/22  Last Fill Quantity: 90,  # refills: 3   Last office visit provider:  3/2/23     Requested Prescriptions   Pending Prescriptions Disp Refills     atorvastatin (LIPITOR) 20 MG tablet 90 tablet 3     Sig: [ATORVASTATIN (LIPITOR) 20 MG TABLET] TAKE 1 TABLET BY MOUTH EVERYDAY AT BEDTIME       Statins Protocol Failed - 3/14/2023  2:06 PM        Failed - LDL on file in past 12 months     Recent Labs   Lab Test 09/24/21  1119   LDL 91             Passed - No abnormal creatine kinase in past 12 months     No lab results found.             Passed - Recent (12 mo) or future (30 days) visit within the authorizing provider's specialty     Patient has had an office visit with the authorizing provider or a provider within the authorizing providers department within the previous 12 mos or has a future within next 30 days. See \"Patient Info\" tab in inbasket, or \"Choose Columns\" in Meds & Orders section of the refill encounter.              Passed - Medication is active on med list        Passed - Patient is age 18 or older             Kelly Camilo, RN 03/15/23 11:59 AM  "

## 2023-03-16 RX ORDER — ATORVASTATIN CALCIUM 20 MG/1
TABLET, FILM COATED ORAL
Qty: 90 TABLET | Refills: 0 | Status: SHIPPED | OUTPATIENT
Start: 2023-03-16 | End: 2023-06-14

## 2023-03-18 ENCOUNTER — MYC MEDICAL ADVICE (OUTPATIENT)
Dept: FAMILY MEDICINE | Facility: CLINIC | Age: 73
End: 2023-03-18
Payer: MEDICARE

## 2023-03-21 DIAGNOSIS — E11.9 TYPE 2 DIABETES MELLITUS WITHOUT COMPLICATION, WITHOUT LONG-TERM CURRENT USE OF INSULIN (H): Primary | ICD-10-CM

## 2023-03-23 ENCOUNTER — TELEPHONE (OUTPATIENT)
Dept: FAMILY MEDICINE | Facility: CLINIC | Age: 73
End: 2023-03-23
Payer: MEDICARE

## 2023-03-23 NOTE — TELEPHONE ENCOUNTER
MTM referral from: [facility] visit (referral by provider)    MTM referral outreach attempt #2 on March 23, 2023 at 10:03 AM      Outcome: Patient not reachable after several attempts, will route to MTM Pharmacist/Provider as an FYI.  [facility] scheduling number is [phone].  Thank you for the referral.    Demetria Kam - [facility]

## 2023-03-29 DIAGNOSIS — I10 BENIGN ESSENTIAL HYPERTENSION: ICD-10-CM

## 2023-03-30 RX ORDER — LOSARTAN POTASSIUM 50 MG/1
TABLET ORAL
Qty: 90 TABLET | Refills: 2 | Status: SHIPPED | OUTPATIENT
Start: 2023-03-30 | End: 2023-12-27

## 2023-03-30 NOTE — TELEPHONE ENCOUNTER
Prescription approved per [facility] Refill Protocol.    CHRISTOPHER BlackwoodN, RN  [facility]

## 2023-04-01 ENCOUNTER — TRANSFERRED RECORDS (OUTPATIENT)
Dept: MULTI SPECIALTY CLINIC | Facility: CLINIC | Age: 73
End: 2023-04-01

## 2023-04-01 LAB — RETINOPATHY: NORMAL

## 2023-06-13 DIAGNOSIS — E78.2 ELEVATED TRIGLYCERIDES WITH HIGH CHOLESTEROL: ICD-10-CM

## 2023-06-14 RX ORDER — ATORVASTATIN CALCIUM 20 MG/1
TABLET, FILM COATED ORAL
Qty: 90 TABLET | Refills: 3 | Status: SHIPPED | OUTPATIENT
Start: 2023-06-14 | End: 2024-07-11

## 2023-06-14 NOTE — TELEPHONE ENCOUNTER
"Routing refill request to provider for review/approval because:  Labs not current:  LDL    Last Written Prescription Date:  3/16/23  Last Fill Quantity: 90,  # refills: 0   Last office visit provider:  3/2/23     Requested Prescriptions   Pending Prescriptions Disp Refills     atorvastatin (LIPITOR) 20 MG tablet [Pharmacy Med Name: ATORVASTATIN CALCIUM 20MG TABS] 90 tablet 0     Sig: TAKE ONE TABLET BY MOUTH EVERY DAY AT BEDTIME       Statins Protocol Failed - 6/14/2023  2:17 PM        Failed - LDL on file in past 12 months     Recent Labs   Lab Test 09/24/21  1119   LDL 91             Passed - No abnormal creatine kinase in past 12 months     No lab results found.             Passed - Recent (12 mo) or future (30 days) visit within the authorizing provider's specialty     Patient has had an office visit with the authorizing provider or a provider within the authorizing providers department within the previous 12 mos or has a future within next 30 days. See \"Patient Info\" tab in inbasket, or \"Choose Columns\" in Meds & Orders section of the refill encounter.              Passed - Medication is active on med list        Passed - Patient is age 18 or older             Bernadette Kan RN 06/14/23 2:17 PM  "

## 2023-07-31 DIAGNOSIS — F33.1 MODERATE EPISODE OF RECURRENT MAJOR DEPRESSIVE DISORDER (H): ICD-10-CM

## 2023-07-31 RX ORDER — OLANZAPINE 5 MG/1
TABLET ORAL
Qty: 180 TABLET | Refills: 1 | Status: SHIPPED | OUTPATIENT
Start: 2023-07-31 | End: 2024-01-29

## 2023-07-31 NOTE — TELEPHONE ENCOUNTER
"Routing refill request to provider for review/approval because:  Labs out of range:  Lipid panel  Labs not current:  Lipid panel    Last Written Prescription Date:  1/30/2023  Last Fill Quantity: 180,  # refills: 1   Last office visit provider:  3/2/2023     Requested Prescriptions   Pending Prescriptions Disp Refills    OLANZapine (ZYPREXA) 5 MG tablet [Pharmacy Med Name: OLANZAPINE 5MG TABS] 180 tablet 1     Sig: TAKE TWO TABLETS BY MOUTH EVERY DAY       Antipsychotic Medications Failed - 7/31/2023 10:40 AM        Failed - Lipid panel on file within the past 12 months     Recent Labs   Lab Test 09/24/21  1119   CHOL 175   TRIG 154*   HDL 53   LDL 91               Passed - Blood pressure under 140/90 in past 12 months     BP Readings from Last 3 Encounters:   03/02/23 138/83   06/23/22 130/82   09/24/21 120/80                 Passed - PHQ9 score less than 5 in past 6 monts     Please review last PHQ-9 score.           Passed - Heart Rate on file within past 12 months     Pulse Readings from Last 3 Encounters:   03/02/23 80   06/23/22 84   09/24/21 67               Passed - A1c or Glucose on file in past 12 months     Recent Labs   Lab Test 03/02/23  0942   *   A1C 6.5*       Please review patients last 3 weights. If a weight gain of >10 lbs exists, you may refill the prescription once after instructing the patient to schedule an appointment within the next 30 days.    Wt Readings from Last 3 Encounters:   03/02/23 117.9 kg (260 lb)   08/30/22 113.4 kg (250 lb)   06/23/22 115.2 kg (254 lb)             Passed - Medication is active on med list        Passed - Patient is 18 years of age or older        Passed - Recent (6 mo) or future (30 days) visit within the authorizing provider's specialty     Patient had office visit in the last 6 months or has a visit in the next 30 days with authorizing provider or within the authorizing provider's specialty.  See \"Patient Info\" tab in inbasket, or \"Choose Columns\" in " Meds & Orders section of the refill encounter.                 Blanca Trejo RN 07/31/23 3:11 PM

## 2023-08-05 ENCOUNTER — HEALTH MAINTENANCE LETTER (OUTPATIENT)
Age: 73
End: 2023-08-05

## 2023-09-01 DIAGNOSIS — M1A.00X0 IDIOPATHIC CHRONIC GOUT WITHOUT TOPHUS, UNSPECIFIED SITE: ICD-10-CM

## 2023-09-01 DIAGNOSIS — F33.42 RECURRENT MAJOR DEPRESSIVE DISORDER, IN FULL REMISSION (H): ICD-10-CM

## 2023-09-01 RX ORDER — ALLOPURINOL 100 MG/1
TABLET ORAL
Qty: 90 TABLET | Refills: 1 | Status: SHIPPED | OUTPATIENT
Start: 2023-09-01 | End: 2024-03-01

## 2023-09-01 NOTE — TELEPHONE ENCOUNTER
"  Approving refill as last PHQ-9 score of 2 on 3/2/2023    Last Written Prescription Date:  3/6/2023  Last Fill Quantity: 90,  # refills: 1   Last office visit provider:  3/2/2023     Requested Prescriptions   Pending Prescriptions Disp Refills    FLUoxetine (PROZAC) 20 MG capsule [Pharmacy Med Name: FLUOXETINE HCL 20MG CAPS] 90 capsule 1     Sig: TAKE ONE CAPSULE BY MOUTH EVERY DAY       SSRIs Protocol Failed - 9/1/2023  2:03 PM        Failed - PHQ-9 score less than 5 in past 6 months     Please review last PHQ-9 score.           Passed - Medication is active on med list        Passed - Patient is age 18 or older        Passed - Recent (6 mo) or future (30 days) visit within the authorizing provider's specialty     Patient had office visit in the last 6 months or has a visit in the next 30 days with authorizing provider or within the authorizing provider's specialty.  See \"Patient Info\" tab in inbasket, or \"Choose Columns\" in Meds & Orders section of the refill encounter.                 Luci Cornejo RN 09/01/23 2:03 PM  "

## 2023-09-01 NOTE — TELEPHONE ENCOUNTER
"Routing refill request to provider for review/approval because:  Labs not current:  Uric acid    Last Written Prescription Date:  3/6/23  Last Fill Quantity: 90,  # refills: 1   Last office visit provider:   3/2/23 with bong    Requested Prescriptions   Pending Prescriptions Disp Refills    allopurinol (ZYLOPRIM) 100 MG tablet [Pharmacy Med Name: ALLOPURINOL 100MG TABS] 90 tablet 1     Sig: TAKE ONE TABLET BY MOUTH EVERY DAY       Gout Agents Protocol Failed - 9/1/2023  2:06 PM        Failed - Has Uric Acid on file in past 12 months and value is less than 6     Recent Labs   Lab Test 12/22/20  0907   URIC 6.3     If level is 6mg/dL or greater, ok to refill one time and refer to provider.           Passed - CBC on file in past 12 months     Recent Labs   Lab Test 03/02/23  0942   WBC 7.2   RBC 4.47   HGB 14.4   HCT 44.6                    Passed - ALT on file in past 12 months     Recent Labs   Lab Test 03/02/23  0942   ALT 36             Passed - Recent (12 mo) or future (30 days) visit within the authorizing provider's specialty     Patient has had an office visit with the authorizing provider or a provider within the authorizing providers department within the previous 12 mos or has a future within next 30 days. See \"Patient Info\" tab in inbasket, or \"Choose Columns\" in Meds & Orders section of the refill encounter.              Passed - Medication is active on med list        Passed - Patient is age 18 or older        Passed - Normal serum creatinine on file in the past 12 months     Recent Labs   Lab Test 03/02/23  0942   CR 0.75       Ok to refill medication if creatinine is low               NAVYA XAVIER RN 09/01/23 2:06 PM  "

## 2023-09-04 ASSESSMENT — ENCOUNTER SYMPTOMS
HEMATURIA: 0
ABDOMINAL PAIN: 0
FREQUENCY: 1
FEVER: 0
DIZZINESS: 0
DYSURIA: 0
COUGH: 0
ARTHRALGIAS: 1
NAUSEA: 0
SHORTNESS OF BREATH: 0
HEADACHES: 0
EYE PAIN: 0
DIARRHEA: 0
CHILLS: 0
PARESTHESIAS: 0
HEMATOCHEZIA: 1
JOINT SWELLING: 0
PALPITATIONS: 0
SORE THROAT: 0
CONSTIPATION: 0
MYALGIAS: 0
WEAKNESS: 0
HEARTBURN: 0
NERVOUS/ANXIOUS: 0

## 2023-09-04 ASSESSMENT — ACTIVITIES OF DAILY LIVING (ADL): CURRENT_FUNCTION: NO ASSISTANCE NEEDED

## 2023-09-08 DIAGNOSIS — F33.42 RECURRENT MAJOR DEPRESSIVE DISORDER, IN FULL REMISSION (H): ICD-10-CM

## 2023-09-08 RX ORDER — BUPROPION HYDROCHLORIDE 150 MG/1
TABLET ORAL
Qty: 90 TABLET | Refills: 1 | Status: SHIPPED | OUTPATIENT
Start: 2023-09-08 | End: 2024-03-08

## 2023-09-08 NOTE — TELEPHONE ENCOUNTER
Routing refill request to provider for review/approval because:  PHQ-9 out of date:      6/21/2022     7:14 PM 8/30/2022     8:54 AM 3/2/2023     9:04 AM   PHQ   PHQ-9 Total Score 9 4 2   Q9: Thoughts of better off dead/self-harm past 2 weeks Several days Not at all Not at all   F/U: Thoughts of suicide or self-harm No     F/U: Safety concerns No           Pending Prescriptions:                       Disp   Refills    buPROPion (WELLBUTRIN XL) 150 MG 24 hr tab*90 tab*1        Sig: TAKE ONE TABLET BY MOUTH EVERY DAY    Last Written Prescription Date:  3/6/23  Last Fill Quantity: 90,  # refills: 1   Last office visitwith prescribing provider: 3/2/2023   Future Office Visit:   Appointments in Next Year      Sep 11, 2023  1:20 PM  (Arrive by 1:00 PM)  Annual Wellness Visit with Rashi Nichols MD  [facility] ([facility]) [phone]            CHRISTOPHER GloverN, RN  [facility]

## 2023-09-11 ENCOUNTER — OFFICE VISIT (OUTPATIENT)
Dept: FAMILY MEDICINE | Facility: CLINIC | Age: 73
End: 2023-09-11
Payer: MEDICARE

## 2023-09-11 DIAGNOSIS — F33.42 RECURRENT MAJOR DEPRESSIVE DISORDER, IN FULL REMISSION (H): ICD-10-CM

## 2023-09-11 DIAGNOSIS — G47.33 OSA (OBSTRUCTIVE SLEEP APNEA): ICD-10-CM

## 2023-09-11 DIAGNOSIS — E66.01 MORBID OBESITY (H): ICD-10-CM

## 2023-09-11 DIAGNOSIS — Z00.00 MEDICARE ANNUAL WELLNESS VISIT, SUBSEQUENT: Primary | ICD-10-CM

## 2023-09-11 DIAGNOSIS — J43.8 OTHER EMPHYSEMA (H): ICD-10-CM

## 2023-09-11 DIAGNOSIS — Z87.39 HISTORY OF GOUT: ICD-10-CM

## 2023-09-11 DIAGNOSIS — E11.9 TYPE 2 DIABETES MELLITUS WITHOUT COMPLICATION, WITHOUT LONG-TERM CURRENT USE OF INSULIN (H): ICD-10-CM

## 2023-09-11 DIAGNOSIS — E78.2 ELEVATED TRIGLYCERIDES WITH HIGH CHOLESTEROL: ICD-10-CM

## 2023-09-11 DIAGNOSIS — Z12.5 SCREENING FOR PROSTATE CANCER: ICD-10-CM

## 2023-09-11 DIAGNOSIS — I10 BENIGN ESSENTIAL HYPERTENSION: ICD-10-CM

## 2023-09-11 LAB
CREAT UR-MCNC: 116 MG/DL
ERYTHROCYTE [DISTWIDTH] IN BLOOD BY AUTOMATED COUNT: 12.9 % (ref 10–15)
HBA1C MFR BLD: 6.8 % (ref 0–5.6)
HCT VFR BLD AUTO: 45.8 % (ref 40–53)
HGB BLD-MCNC: 14.9 G/DL (ref 13.3–17.7)
MCH RBC QN AUTO: 33 PG (ref 26.5–33)
MCHC RBC AUTO-ENTMCNC: 32.5 G/DL (ref 31.5–36.5)
MCV RBC AUTO: 102 FL (ref 78–100)
MICROALBUMIN UR-MCNC: <12 MG/L
MICROALBUMIN/CREAT UR: NORMAL MG/G{CREAT}
PLATELET # BLD AUTO: 169 10E3/UL (ref 150–450)
RBC # BLD AUTO: 4.51 10E6/UL (ref 4.4–5.9)
WBC # BLD AUTO: 6.1 10E3/UL (ref 4–11)

## 2023-09-11 PROCEDURE — 80061 LIPID PANEL: CPT | Performed by: FAMILY MEDICINE

## 2023-09-11 PROCEDURE — 83036 HEMOGLOBIN GLYCOSYLATED A1C: CPT | Performed by: FAMILY MEDICINE

## 2023-09-11 PROCEDURE — G0103 PSA SCREENING: HCPCS | Performed by: FAMILY MEDICINE

## 2023-09-11 PROCEDURE — 99214 OFFICE O/P EST MOD 30 MIN: CPT | Mod: 25 | Performed by: FAMILY MEDICINE

## 2023-09-11 PROCEDURE — G0439 PPPS, SUBSEQ VISIT: HCPCS | Performed by: FAMILY MEDICINE

## 2023-09-11 PROCEDURE — 85027 COMPLETE CBC AUTOMATED: CPT | Performed by: FAMILY MEDICINE

## 2023-09-11 PROCEDURE — 80053 COMPREHEN METABOLIC PANEL: CPT | Performed by: FAMILY MEDICINE

## 2023-09-11 PROCEDURE — 99207 PR FOOT EXAM NO CHARGE: CPT | Performed by: FAMILY MEDICINE

## 2023-09-11 PROCEDURE — 82043 UR ALBUMIN QUANTITATIVE: CPT | Performed by: FAMILY MEDICINE

## 2023-09-11 PROCEDURE — 82570 ASSAY OF URINE CREATININE: CPT | Performed by: FAMILY MEDICINE

## 2023-09-11 PROCEDURE — 36415 COLL VENOUS BLD VENIPUNCTURE: CPT | Performed by: FAMILY MEDICINE

## 2023-09-11 PROCEDURE — 84550 ASSAY OF BLOOD/URIC ACID: CPT | Performed by: FAMILY MEDICINE

## 2023-09-11 ASSESSMENT — PAIN SCALES - GENERAL: PAINLEVEL: NO PAIN (0)

## 2023-09-11 NOTE — PROGRESS NOTES
SUBJECTIVE:   Christopher is a 72 year old who presents for Preventive Visit.    He has type II diabetes, is currently diet controlled. Last A1c was 6.5 up from 5.9 the time before. It is increased further up to 6.8. We discussed continued efforts of dietary refinement. We identified alcohol consumption is a factor that contributes to diabetic control. We discussed the benefits of starting medication therapy with metformin he is ultimately agreeable. Will begin half tablet twice daily increase to full tablet after one week if no significant digestive system side effects.    He is up-to-date with eye exam as of April. He does not have retinopathy.    He reports no concerns with his feet no history of neuropathy foot exam performed today. See below    He has normal renal function, no history of microalbuminuria normal GFR readings. He is on losartan for blood pressure control. Blood pressures at the upper limits of a deal range. Alcohol consumption likely contributes to blood pressure being higher than ideal. Recommended cutting back on alcohol as a means to help improve blood pressure control.    He is on a statin for vascular disease risk reduction.    He does not have any signs symptoms are history of vascular disease.    He is a history of gout. He is on allopurinol it is been several years since he has had an outbreak discussed recheck in uric acid level today.    Body mass index is elevated discussed the importance of weight loss. Discussed some specific strategies.    He has obstructive sleep apnea.    He drinks alcohol regularly. For a number of years perhaps 10 or more he has had at least eight glasses of wine. Patient does recognize this is a problem. He is not ready to commit to complete alcohol cessation. He has had difficulty with trying to cut back. We discussed this extensively. I gave him my honest opinion stating that I think for his situation he needs to enter an alcohol treatment program either inpatient  or outpatient and plan to give up alcohol completely. At the very least he needs to cut back discussed a strategy for doing this is a harm reduction mechanism.    He is osteoarthritis and is planning on knee replacement surgery. He brought with him a preop form today. Unfortunately his surgery was 31 days from this date. This is my first meeting with him given the necessity of having the surgery within 30 days I do recommend that he return much closer to the time of the procedure for a preoperative evaluation.   She he is ultimately agreeable.      We discussed immunizations, colon cancer screening and prostate cancer screening.            Are you in the first 12 months of your Medicare coverage?  No    Have you ever done Advance Care Planning? (For example, a Health Directive, POLST, or a discussion with a medical provider or your loved ones about your wishes): Yes, advance care planning is on file.    Fallen 2 or more times in the past year?: No  Any fall with injury in the past year?: No    Cognitive Screening: Performed.    1) Repeat 3 items: 3/3   2) Clock draw: 100%  3) 3 item recall: Recalls 3 objects  Results: 3 items recalled: COGNITIVE IMPAIRMENT LESS LIKELY    Mini-CogTM Copyright S Osvaldo. Licensed by the author for use in [facility]; reprinted with permission (michelle@[facility]). All rights reserved.      Do you have sleep apnea, excessive snoring or daytime drowsiness? : yes    Reviewed and updated as needed this visit by clinical staff    Reviewed and updated as needed this visit by Provider  Answers submitted by the patient for this visit:  Patient Health Questionnaire (Submitted on 9/10/2023)  If you checked off any problems, how difficult have these problems made it for you to do your work, take care of things at home, or get along with other people?: Not difficult at all  PHQ9 TOTAL SCORE: 2  Annual Preventive Visit (Submitted on 9/4/2023)  Chief Complaint: Annual Exam:  In general, how  "would you rate your overall physical health?: fair  Frequency of exercise:: 4-5 days/week  Do you usually eat at least 4 servings of fruit and vegetables a day, include whole grains & fiber, and avoid regularly eating high fat or \"junk\" foods? : No  Taking medications regularly:: Yes  Medication side effects:: None, No significant flushing, Muscle aches  Activities of Daily Living: no assistance needed  Home safety: lack of grab bars in the bathroom  Hearing Impairment:: difficulty following a conversation in a noisy restaurant or crowded room, feel that people are mumbling or not speaking clearly, find that men's voices are easier to understand than woman's  In the past 6 months, have you been bothered by leaking of urine?: No  In general, how would you rate your overall mental or emotional health?: fair  Additional concerns today:: No  Exercise outside of work (Submitted on 2023)  Chief Complaint: Annual Exam:  Duration of exercise:: 15-30 minutes    Social History     Tobacco Use    Smoking status: Former     Packs/day: 1.00     Years: 40.00     Pack years: 40.00     Types: Cigarettes     Quit date: 2014     Years since quittin.6    Smokeless tobacco: Never    Tobacco comments:     still using e-cigs at lowest level, trying to quit   Substance Use Topics    Alcohol use: Yes     Alcohol/week: 56.0 standard drinks of alcohol     Types: 56 Glasses of wine per week     Comment: Drinks 8 glasses of wine daily             2023    12:17 PM   Alcohol Use   Prescreen: >3 drinks/day or >7 drinks/week? Yes   AUDIT SCORE  23         2023    12:17 PM   AUDIT - Alcohol Use Disorders Identification Test - Reproduced from the World Health Organization Audit 2001 (Second Edition)   1.  How often do you have a drink containing alcohol? 4 or more times a week   2.  How many drinks containing alcohol do you have on a typical day when you are drinking? 7 to 9   3.  How often do you have five or more drinks on one " occasion? Daily or almost daily   4.  How often during the last year have you found that you were not able to stop drinking once you had started? Less than monthly   5.  How often during the last year have you failed to do what was normally expected of you because of drinking? Monthly   6.  How often during the last year have you needed a first drink in the morning to get yourself going after a heavy drinking session? Never   7.  How often during the last year have you had a feeling of guilt or remorse after drinking? Daily or almost daily   8.  How often during the last year have you been unable to remember what happened the night before because of your drinking? Less than monthly   9.  Have you or someone else been injured because of your drinking? No   10. Has a relative, friend, doctor or other health care worker been concerned about your drinking or suggested you cut down? Yes, during the last year   TOTAL SCORE 23     Do you have a current opioid prescription? No  Do you use any other controlled substances or medications that are not prescribed by a provider? None        Current providers sharing in care for this patient include  Patient Care Team:  Shira Contreras MD as PCP - Michelle Santos MSW as Assigned Behavioral Health Provider  Shira Contreras MD as Assigned PCP    The following health maintenance items are reviewed in Epic and correct as of today:  Health Maintenance   Topic Date Due    NICOTINE/TOBACCO CESSATION COUNSELING Q 1 YR  Never done    SPIROMETRY  Never done    COPD ACTION PLAN  Never done    DEPRESSION ACTION PLAN  Never done    ZOSTER IMMUNIZATION (1 of 2) Never done    LUNG CANCER SCREENING  Never done    DTAP/TDAP/TD IMMUNIZATION (1 - Tdap) 03/08/2019    MEDICARE ANNUAL WELLNESS VISIT  09/24/2022    LIPID  09/24/2022    DIABETIC FOOT EXAM  09/24/2022    COVID-19 Vaccine (6 - Pfizer series) 01/08/2023    MICROALBUMIN  06/23/2023    ANNUAL REVIEW OF   "ORDERS  06/23/2023    A1C  12/11/2023    BMP  03/02/2024    EYE EXAM  03/07/2024    PHQ-9  03/11/2024    FALL RISK ASSESSMENT  09/11/2024    COLORECTAL CANCER SCREENING  01/11/2026    ADVANCE CARE PLANNING  09/11/2028    HEPATITIS C SCREENING  Completed    INFLUENZA VACCINE  Completed    Pneumococcal Vaccine: 65+ Years  Completed    AORTIC ANEURYSM SCREENING (SYSTEM ASSIGNED)  Completed    IPV IMMUNIZATION  Aged Out    HPV IMMUNIZATION  Aged Out    MENINGITIS IMMUNIZATION  Aged Out               Review of Systems  Complete review of systems is obtained.  Other than the specific considerations noted above complete review of systems is negative.      OBJECTIVE:   There were no vitals taken for this visit. Estimated body mass index is 39.63 kg/m  as calculated from the following:    Height as of 3/2/23: 1.725 m (5' 7.91\").    Weight as of 3/2/23: 117.9 kg (260 lb).  Physical Exam          General Appearance:    Alert, cooperative, no distress   Eyes:   No scleral icterus or conjunctival irritation       Ears:    Normal TM's and external ear canals, both ears   Throat:   Lips, mucosa, and tongue normal; teeth and gums normal   Neck:   Supple, symmetrical, trachea midline, no adenopathy;        thyroid:  No enlargement/tenderness/nodules   Lungs:     Clear to auscultation bilaterally, respirations unlabored, no wheezesor crackles   Heart:    Regular rate and rhythm,  No murmur   Abdomen:    Soft, no distention, no tenderness on palpation, no masses, no organomegaly     Extremities:  No edema, no jointswelling or redness, no evidence of any injuries, palpable dorsalis pedis pulses, palpable posterior tibialis pulses.  No ulcerations.  No significant callus formation.  No other foot deformities.   Skin:  Noconcerning skin findings, no suspicious moles, no rashes   Neurologic:  On gross examination there is no motor or sensory deficit.  Specific examination of the feet using the monofilament line revealsthat there is no " "absence of sensation.  Patient walks with a normal gait               ASSESSMENT / PLAN:   Christopher was seen today for recheck medication, wellness visit and pre-op exam.    Diagnoses and all orders for this visit:    Other emphysema (H)    Type 2 diabetes mellitus without complication, without long-term current use of insulin (H)  -     Comprehensive metabolic panel (BMP + Alb, Alk Phos, ALT, AST, Total. Bili, TP); Future  -     Hemoglobin A1c; Future  -     CBC with platelets  -     Comprehensive metabolic panel (BMP + Alb, Alk Phos, ALT, AST, Total. Bili, TP)  -     Hemoglobin A1c  -     metFORMIN (GLUCOPHAGE) 500 MG tablet; Take 1 tablet (500 mg) by mouth 2 times daily (with meals)  -     Albumin Random Urine Quantitative with Creat Ratio    Elevated triglycerides with high cholesterol  -     Lipid panel reflex to direct LDL Fasting; Future  -     Comprehensive metabolic panel (BMP + Alb, Alk Phos, ALT, AST, Total. Bili, TP); Future  -     Lipid panel reflex to direct LDL Fasting  -     Comprehensive metabolic panel (BMP + Alb, Alk Phos, ALT, AST, Total. Bili, TP)    Screening for prostate cancer  -     PSA, screen    History of gout  -     Uric acid    Other orders  -     Cancel: REVIEW OF HEALTH MAINTENANCE PROTOCOL ORDERS           Patient has been advised of split billing requirements and indicates understanding: Yes      COUNSELING:  Reviewed preventive health counseling, as reflected in patient instructions       Regular exercise       Healthy diet/nutrition       Vision screening       Dental care       Alcohol Use        Colon cancer screening       Prostate cancer screening      BMI:   Estimated body mass index is 39.63 kg/m  as calculated from the following:    Height as of 3/2/23: 1.725 m (5' 7.91\").    Weight as of 3/2/23: 117.9 kg (260 lb).   Weight management plan: Discussed healthy diet and exercise guidelines      He reports that he quit smoking about 9 years ago. His smoking use included cigarettes. " He has a 40.00 pack-year smoking history. He has never used smokeless tobacco.      Appropriate preventive services were discussed with this patient, including applicable screening as appropriate for cardiovascular disease, diabetes, osteopenia/osteoporosis, and glaucoma.  As appropriate for age/gender, discussed screening for colorectal cancer, prostate cancer, breast cancer, and cervical cancer. Checklist reviewing preventive services available has been given to the patient.    Reviewed patients plan of care and provided an AVS. The Basic Care Plan (routine screening as documented in Health Maintenance) for Christopher meets the Care Plan requirement. This Care Plan has been established and reviewed with the Patient.          Rashi Nichols MD, MD  [facility]    Identified Health Risks:

## 2023-09-12 LAB
ALBUMIN SERPL BCG-MCNC: 4.3 G/DL (ref 3.5–5.2)
ALP SERPL-CCNC: 118 U/L (ref 40–129)
ALT SERPL W P-5'-P-CCNC: 42 U/L (ref 0–70)
ANION GAP SERPL CALCULATED.3IONS-SCNC: 11 MMOL/L (ref 7–15)
AST SERPL W P-5'-P-CCNC: 46 U/L (ref 0–45)
BILIRUB SERPL-MCNC: 0.3 MG/DL
BUN SERPL-MCNC: 10.7 MG/DL (ref 8–23)
CALCIUM SERPL-MCNC: 9.6 MG/DL (ref 8.8–10.2)
CHLORIDE SERPL-SCNC: 102 MMOL/L (ref 98–107)
CHOLEST SERPL-MCNC: 165 MG/DL
CREAT SERPL-MCNC: 0.77 MG/DL (ref 0.67–1.17)
DEPRECATED HCO3 PLAS-SCNC: 29 MMOL/L (ref 22–29)
EGFRCR SERPLBLD CKD-EPI 2021: >90 ML/MIN/1.73M2
GLUCOSE SERPL-MCNC: 127 MG/DL (ref 70–99)
HDLC SERPL-MCNC: 58 MG/DL
LDLC SERPL CALC-MCNC: 83 MG/DL
NONHDLC SERPL-MCNC: 107 MG/DL
POTASSIUM SERPL-SCNC: 4.4 MMOL/L (ref 3.4–5.3)
PROT SERPL-MCNC: 7.2 G/DL (ref 6.4–8.3)
PSA SERPL DL<=0.01 NG/ML-MCNC: 0.41 NG/ML (ref 0–6.5)
SODIUM SERPL-SCNC: 142 MMOL/L (ref 136–145)
TRIGL SERPL-MCNC: 120 MG/DL
URATE SERPL-MCNC: 5.1 MG/DL (ref 3.4–7)

## 2023-09-15 VITALS
HEIGHT: 68 IN | BODY MASS INDEX: 38.34 KG/M2 | WEIGHT: 253 LBS | DIASTOLIC BLOOD PRESSURE: 84 MMHG | RESPIRATION RATE: 18 BRPM | TEMPERATURE: 98.1 F | SYSTOLIC BLOOD PRESSURE: 138 MMHG | OXYGEN SATURATION: 98 % | HEART RATE: 81 BPM

## 2023-09-20 ENCOUNTER — MYC MEDICAL ADVICE (OUTPATIENT)
Dept: FAMILY MEDICINE | Facility: CLINIC | Age: 73
End: 2023-09-20
Payer: MEDICARE

## 2023-09-20 DIAGNOSIS — E11.9 TYPE 2 DIABETES MELLITUS WITHOUT COMPLICATION, WITHOUT LONG-TERM CURRENT USE OF INSULIN (H): Primary | ICD-10-CM

## 2023-09-20 NOTE — TELEPHONE ENCOUNTER
"Patient was seen by Dr Nichols on 9/1823 and started on Metformin:  \"He has type II diabetes, is currently diet controlled. Last A1c was 6.5 up from 5.9 the time before. It is increased further up to 6.8. We discussed continued efforts of dietary refinement. We identified alcohol consumption is a factor that contributes to diabetic control. We discussed the benefits of starting medication therapy with metformin he is ultimately agreeable. Will begin half tablet twice daily increase to full tablet after one week if no significant digestive system side effects.\"    No notes about the need to check BS. Rx pended for requesting supplies; please advise if appropriate.    CHRISTOPHER GloverN, RN  [facility]  "

## 2023-10-02 ENCOUNTER — OFFICE VISIT (OUTPATIENT)
Dept: FAMILY MEDICINE | Facility: CLINIC | Age: 73
End: 2023-10-02
Payer: MEDICARE

## 2023-10-02 VITALS
OXYGEN SATURATION: 90 % | HEART RATE: 70 BPM | BODY MASS INDEX: 38.54 KG/M2 | RESPIRATION RATE: 16 BRPM | WEIGHT: 252.8 LBS | TEMPERATURE: 97.7 F | DIASTOLIC BLOOD PRESSURE: 62 MMHG | SYSTOLIC BLOOD PRESSURE: 119 MMHG

## 2023-10-02 DIAGNOSIS — G47.33 OSA (OBSTRUCTIVE SLEEP APNEA): ICD-10-CM

## 2023-10-02 DIAGNOSIS — Z01.818 PREOP GENERAL PHYSICAL EXAM: Primary | ICD-10-CM

## 2023-10-02 DIAGNOSIS — E11.9 TYPE 2 DIABETES MELLITUS WITHOUT COMPLICATION, WITHOUT LONG-TERM CURRENT USE OF INSULIN (H): ICD-10-CM

## 2023-10-02 DIAGNOSIS — Z87.39 HISTORY OF GOUT: ICD-10-CM

## 2023-10-02 DIAGNOSIS — M17.11 PRIMARY OSTEOARTHRITIS OF RIGHT KNEE: ICD-10-CM

## 2023-10-02 DIAGNOSIS — E66.01 MORBID OBESITY (H): ICD-10-CM

## 2023-10-02 DIAGNOSIS — I10 BENIGN ESSENTIAL HYPERTENSION: ICD-10-CM

## 2023-10-02 LAB
ANION GAP SERPL CALCULATED.3IONS-SCNC: 12 MMOL/L (ref 7–15)
BUN SERPL-MCNC: 14.3 MG/DL (ref 8–23)
CALCIUM SERPL-MCNC: 9.4 MG/DL (ref 8.8–10.2)
CHLORIDE SERPL-SCNC: 103 MMOL/L (ref 98–107)
CREAT SERPL-MCNC: 0.71 MG/DL (ref 0.67–1.17)
DEPRECATED HCO3 PLAS-SCNC: 28 MMOL/L (ref 22–29)
EGFRCR SERPLBLD CKD-EPI 2021: >90 ML/MIN/1.73M2
ERYTHROCYTE [DISTWIDTH] IN BLOOD BY AUTOMATED COUNT: 13.6 % (ref 10–15)
GLUCOSE SERPL-MCNC: 146 MG/DL (ref 70–99)
HCT VFR BLD AUTO: 43 % (ref 40–53)
HGB BLD-MCNC: 13.8 G/DL (ref 13.3–17.7)
MCH RBC QN AUTO: 33.1 PG (ref 26.5–33)
MCHC RBC AUTO-ENTMCNC: 32.1 G/DL (ref 31.5–36.5)
MCV RBC AUTO: 103 FL (ref 78–100)
PLATELET # BLD AUTO: 180 10E3/UL (ref 150–450)
POTASSIUM SERPL-SCNC: 4.5 MMOL/L (ref 3.4–5.3)
RBC # BLD AUTO: 4.17 10E6/UL (ref 4.4–5.9)
SODIUM SERPL-SCNC: 143 MMOL/L (ref 135–145)
WBC # BLD AUTO: 7.6 10E3/UL (ref 4–11)

## 2023-10-02 PROCEDURE — 85027 COMPLETE CBC AUTOMATED: CPT | Performed by: FAMILY MEDICINE

## 2023-10-02 PROCEDURE — 80048 BASIC METABOLIC PNL TOTAL CA: CPT | Performed by: FAMILY MEDICINE

## 2023-10-02 PROCEDURE — 93010 ELECTROCARDIOGRAM REPORT: CPT | Mod: OFF | Performed by: INTERNAL MEDICINE

## 2023-10-02 PROCEDURE — 99214 OFFICE O/P EST MOD 30 MIN: CPT | Mod: 25 | Performed by: FAMILY MEDICINE

## 2023-10-02 PROCEDURE — 36415 COLL VENOUS BLD VENIPUNCTURE: CPT | Performed by: FAMILY MEDICINE

## 2023-10-02 PROCEDURE — 93005 ELECTROCARDIOGRAM TRACING: CPT | Performed by: FAMILY MEDICINE

## 2023-10-02 RX ORDER — ONDANSETRON 4 MG/1
4 TABLET, ORALLY DISINTEGRATING ORAL EVERY 8 HOURS PRN
Status: ON HOLD | COMMUNITY
Start: 2023-09-25 | End: 2024-03-11

## 2023-10-02 RX ORDER — HYDROXYZINE HYDROCHLORIDE 10 MG/1
10 TABLET, FILM COATED ORAL 4 TIMES DAILY PRN
Status: ON HOLD | COMMUNITY
Start: 2023-09-25 | End: 2024-03-11

## 2023-10-02 RX ORDER — MUPIROCIN 20 MG/G
OINTMENT TOPICAL
Status: ON HOLD | COMMUNITY
Start: 2023-09-25 | End: 2023-11-16

## 2023-10-02 RX ORDER — INSULIN HUMAN 100 [IU]/ML
INJECTION, SOLUTION PARENTERAL
COMMUNITY
Start: 2023-09-25 | End: 2023-10-02

## 2023-10-02 RX ORDER — CEFADROXIL 500 MG/1
CAPSULE ORAL
Status: ON HOLD | COMMUNITY
Start: 2023-09-25 | End: 2024-03-11

## 2023-10-02 RX ORDER — OXYCODONE HYDROCHLORIDE 5 MG/1
5-10 TABLET ORAL EVERY 4 HOURS PRN
Status: ON HOLD | COMMUNITY
Start: 2023-09-25 | End: 2024-03-11

## 2023-10-02 ASSESSMENT — PAIN SCALES - GENERAL: PAINLEVEL: NO PAIN (0)

## 2023-10-02 NOTE — PROGRESS NOTES
[facility]  [address] [place] [address] [address]VE [facility] [address]  [facility] [address]  Phone: [phone]  Fax: [phone]  Primary Provider: Shira Contreras  Pre-op Performing Provider: LOUISE MURO      PREOPERATIVE EVALUATION:  Today's date: 10/2/2023    Christopher is a 72 year old male who presents for a preoperative evaluation.      10/2/2023    11:10 AM   Additional Questions   Roomed by Melissa       Surgical Information:  Surgery/Procedure: R Total Knee Arthroplasty  Surgery Location: [facility]  Surgeon: Dr. Urbano  Surgery Date: 10/12/2023  Time of Surgery: TBD  Where patient plans to recover: At home with family  Fax number for surgical facility: [phone]    Assessment & Plan     The proposed surgical procedure is considered INTERMEDIATE risk.    Christopher was seen today for pre-op exam.    Diagnoses and all orders for this visit:    Preop general physical exam    Primary osteoarthritis of right knee    Type 2 diabetes mellitus without complication, without long-term current use of insulin (H)  -     Basic metabolic panel  -     CBC with platelets  -     EKG 12-lead, tracing only    History of gout    Benign essential hypertension    TERESA (obstructive sleep apnea)    Obesity (BMI 35.0-39.9) with comorbidity (H)            - No identified additional risk factors other than previously addressed    Antiplatelet or Anticoagulation Medication Instructions:   - Patient is on no antiplatelet or anticoagulation medications.    Additional Medication Instructions:   - ACE/ARB: HOLD on day of surgery (minimum 11 hours for general anesthesia).   - Calcium Channel Blockers: May be continued on the day of surgery.   - fenofibrate, niacin, non-statin lipid meds: HOLD on day of surgery.   - Statins: Continue taking on the day of surgery.    - metformin: HOLD day of surgery.   - SSRIs, SNRIs, TCAs, Antipsychotics: Continue without modification.     RECOMMENDATION:  APPROVAL GIVEN to proceed with  proposed procedure, without further diagnostic evaluation.            Subjective       HPI related to upcoming procedure:     He has severe symptomatic right knee osteoarthritis and has exhausted conservative means for treatment and is now scheduled for knee arthroplasty.    He has diabetes current A1c 6.8.    Blood pressure is controlled.    He has normal renal function.    He drinks alcohol regularly is working to cut back. Drinks approximately seven glasses of wine on a nightly basis and is working to further reduce alcohol consumption. No history of withdrawal symptoms.    He is a history of gout with no recent gout attacks. Remains on suppression therapy.    He has no history of heart disease or other vascular disease.    He is a history of obstructive sleep apnea, he states he was placed on BiPAP and he could not handle wearing any masks so he is untreated currently.    He reports no signs or symptoms of an acute illness.    He reports no history of any significant problems or complications related to anesthesia.        10/2/2023    11:01 AM   Preop Questions   1. Have you ever had a heart attack or stroke? No   2. Have you ever had surgery on your heart or blood vessels, such as a stent placement, a coronary artery bypass, or surgery on an artery in your head, neck, heart, or legs? No   3. Do you have chest pain with activity? No   4. Do you have a history of  heart failure? No   5. Do you currently have a cold, bronchitis or symptoms of other infection? No   6. Do you have a cough, shortness of breath, or wheezing? No   7. Do you or anyone in your family have previous history of blood clots? No   8. Do you or does anyone in your family have a serious bleeding problem such as prolonged bleeding following surgeries or cuts? No   9. Have you ever had problems with anemia or been told to take iron pills? No   10. Have you had any abnormal blood loss such as black, tarry or bloody stools? YES -    11. Have you  ever had a blood transfusion? No   12. Are you willing to have a blood transfusion if it is medically needed before, during, or after your surgery? Yes   13. Have you or any of your relatives ever had problems with anesthesia? No   14. Do you have sleep apnea, excessive snoring or daytime drowsiness? YES -    14a. Do you have a CPAP machine? No   15. Do you have any artifical heart valves or other implanted medical devices like a pacemaker, defibrillator, or continuous glucose monitor? No   16. Do you have artificial joints? No   17. Are you allergic to latex? No       Health Care Directive:  Patient does not have a Health Care Directive or Living Will:     Preoperative Review of :   reviewed - controlled substances reflected in medication list.      Status of Chronic Conditions:  See problem list for active medical problems.  Problems all longstanding and stable, except as noted/documented.  See ROS for pertinent symptoms related to these conditions.    Review of Systems  Complete review of systems is obtained.  Other than the specific considerations noted above complete review of systems is negative.      Patient Active Problem List    Diagnosis Date Noted    Alcohol dependence (H) 09/30/2022     Priority: Medium    Type 2 diabetes mellitus without complication, without long-term current use of insulin (H) 07/07/2020     Priority: Medium    Hx of gout 01/10/2019     Priority: Medium     Uric acid level 5.5 in 2017        Obesity hypoventilation syndrome (H) 01/08/2019     Priority: Medium    Restrictive lung disease 01/08/2019     Priority: Medium    Hearing problem of both ears 01/08/2019     Priority: Medium     Saw audiology 2017 - rec f/u 3 yrs        Multiple thyroid nodules 01/08/2019     Priority: Medium     Noted on thyroid US 2014 - 0.6 cm right nodule, 0.4 cm left nodule - rec   f/u 6-12 months        Abnormal liver ultrasound 01/08/2019     Priority: Medium     Changes c/w hepatocellular disease  vs fatty liver        Hx of colonic polyps 01/08/2019     Priority: Medium     Colonoscopy 2016 - 2 hyperplastic polyps - likely f/u 5 yrs        Elevated triglycerides with high cholesterol 12/04/2018     Priority: Medium    Benign essential hypertension 12/04/2018     Priority: Medium     Coronary calcium score 133 in 2017        Other emphysema (H) 12/04/2018     Priority: Medium     Hx following with pulmonology, [facility]        Recurrent major depressive disorder, in full remission (H24) 12/04/2018     Priority: Medium    SVT (supraventricular tachycardia) 12/04/2018     Priority: Medium     Hx 2 episodes in past year, now on diltiazem for this        TERESA (obstructive sleep apnea) 12/04/2018     Priority: Medium     Has BIPAP machine        Bilateral primary osteoarthritis of knee 12/04/2018     Priority: Medium    Obesity (BMI 35.0-39.9) with comorbidity (H) 12/04/2018     Priority: Medium      Past Medical History:   Diagnosis Date    Benign essential hypertension 12/4/2018    Elevated triglycerides with high cholesterol 12/4/2018    TERESA (obstructive sleep apnea) 12/4/2018    Has BIPAP machine    Other emphysema (H) 12/4/2018    Hx following with pulmonology, [facility]    Patellofemoral disorder of both knees 12/4/2018    Prediabetes 12/4/2018    Recurrent major depressive disorder, in full remission (H) 12/4/2018    SVT (supraventricular tachycardia) (H) 12/4/2018    Hx 2 episodes in past year, now on diltiazem for this     Past Surgical History:   Procedure Laterality Date    HEMORRHOIDECTOMY EXTERNAL  2004    INCISION AND DRAINAGE PERIRECTAL ABSCESS       Current Outpatient Medications   Medication Sig Dispense Refill    alcohol swab prep pads Use to swab area of injection/malia as directed. 100 each 6    allopurinol (ZYLOPRIM) 100 MG tablet TAKE ONE TABLET BY MOUTH EVERY DAY 90 tablet 1    atorvastatin (LIPITOR) 20 MG tablet TAKE ONE TABLET BY MOUTH EVERY DAY AT BEDTIME 90 tablet 3    blood glucose (NO  BRAND SPECIFIED) lancets standard Use to test blood sugar 1 times daily or as directed. 100 each 1    blood glucose (NO BRAND SPECIFIED) test strip Use to test blood sugar 1 times daily or as directed. OneTouch Verio test strips 100 strip 4    blood glucose (NO BRAND SPECIFIED) test strip Use to test blood sugar 1 times daily or as directed. 100 strip 1    blood glucose (ONE TOUCH DELICA) lancing device Lancing device to be used with lancets. 1 each 0    blood glucose monitoring (NO BRAND SPECIFIED) meter device kit Use to test blood sugar 1 times daily or as directed. 1 kit 1    blood glucose monitoring (ONETOUCH VERIO) meter device kit Use to test blood sugar 1 times daily or as directed. 1 kit 0    buPROPion (WELLBUTRIN XL) 150 MG 24 hr tablet TAKE ONE TABLET BY MOUTH EVERY DAY 90 tablet 1    cefadroxil (DURICEF) 500 MG capsule take one capsule by mouth twice a day for 7 days      diltiazem ER COATED BEADS (CARDIZEM CD/CARTIA XT) 180 MG 24 hr capsule TAKE ONE CAPSULE BY MOUTH EVERY DAY 90 capsule 3    fenofibrate (TRICOR) 48 MG tablet Take 1 tablet (48 mg) by mouth daily 90 tablet 3    FLUoxetine (PROZAC) 20 MG capsule TAKE ONE CAPSULE BY MOUTH EVERY DAY 90 capsule 0    hydrOXYzine (ATARAX) 10 MG tablet Take 10 mg by mouth 4 times daily as needed      losartan (COZAAR) 50 MG tablet TAKE ONE TABLET BY MOUTH EVERY DAY 90 tablet 2    metFORMIN (GLUCOPHAGE) 500 MG tablet Take 1 tablet (500 mg) by mouth 2 times daily (with meals) 180 tablet 1    mupirocin (BACTROBAN) 2 % external ointment SWAB EACH NOSTRIL TWO TIMES A DAY , BEGIN 5 DAYS PRIOR TO SURGERY.      OLANZapine (ZYPREXA) 5 MG tablet TAKE TWO TABLETS BY MOUTH EVERY  tablet 1    ondansetron (ZOFRAN ODT) 4 MG ODT tab DISSOLVE ONE TABLET BY MOUTH FOUR TIMES A DAY AS NEEDED FOR FOR NAUSEA      oxyCODONE (ROXICODONE) 5 MG tablet TAKE ONE TO TWO TABLETS BY MOUTH EVERY 4 HOURS AS NEEDED FOR PAIN MAX 6TABS/DAY         No Known Allergies     Social History      Tobacco Use    Smoking status: Former     Packs/day: 1.00     Years: 40.00     Pack years: 40.00     Types: Cigarettes     Quit date: 2014     Years since quittin.7    Smokeless tobacco: Never    Tobacco comments:     still using e-cigs at lowest level, trying to quit   Substance Use Topics    Alcohol use: Yes     Alcohol/week: 56.0 standard drinks of alcohol     Types: 56 Glasses of wine per week     Comment: Drinks 8 glasses of wine daily     Family History   Problem Relation Age of Onset    Pancreatic Cancer Mother 62.00    Depression Mother     Cancer Father 88.00        gallbladder    Diabetes Sister     Depression Sister     No Known Problems Brother     Heart Failure Maternal Grandmother          early 70s    Cerebrovascular Disease Maternal Grandfather          in 80s    Heart Failure Paternal Grandmother          in 90s    Coronary Artery Disease Paternal Grandfather         late 60s    Heart Failure Paternal Grandfather     No Known Problems Brother      History   Drug Use No         Objective     /62 (BP Location: Left arm, Patient Position: Sitting, Cuff Size: Adult Large)   Pulse 70   Temp 97.7  F (36.5  C) (Temporal)   Resp 16   Wt 114.7 kg (252 lb 12.8 oz)   SpO2 90%   BMI 38.54 kg/m      Physical Exam        General Appearance:    Alert, cooperative, no distress   Eyes:   No scleral icterus or conjunctival irritation       Ears:    Normal TM's and external ear canals, both ears   Throat:   Lips, mucosa, and tongue normal; teeth and gums normal   Neck:   Supple, symmetrical, trachea midline, no adenopathy;        thyroid:  No enlargement/tenderness/nodules   Lungs:     Clear to auscultation bilaterally, respirations unlabored, no wheezes or crackles   Heart:    Regular rate and rhythm,  No murmur   Abdomen:    Soft, no distention, no tenderness on palpation, no masses, no organomegaly     Extremities:  No edema, no joint swelling or redness, no evidence of any  injuries   Skin:  No concerning skin findings, no suspicious moles, no rashes   Neurologic:  On gross examination there is no motor or sensory deficit.  Patient walks with a normal gait             Recent Labs   Lab Test 09/11/23  1324 03/02/23  0942   HGB 14.9 14.4    181    140   POTASSIUM 4.4 4.2   CR 0.77 0.75   A1C 6.8* 6.5*        Diagnostics:  Labs pending at this time.  Results will be reviewed when available.     Recent Results (from the past 24 hour(s))   EKG 12-lead, tracing only    Collection Time: 10/02/23 12:09 PM   Result Value Ref Range    Systolic Blood Pressure  mmHg    Diastolic Blood Pressure  mmHg    Ventricular Rate 67 BPM    Atrial Rate 67 BPM    OR Interval 180 ms    QRS Duration 110 ms     ms    QTc 454 ms    P Axis 49 degrees    R AXIS 6 degrees    T Axis 42 degrees    Interpretation ECG       Sinus rhythm  Normal ECG  No previous ECGs available          Hemoglobin A1C   Date Value Ref Range Status   09/11/2023 6.8 (H) 0.0 - 5.6 % Final     Comment:     Normal <5.7%   Prediabetes 5.7-6.4%    Diabetes 6.5% or higher     Note: Adopted from ADA consensus guidelines.        Revised Cardiac Risk Index (RCRI):  The patient has the following serious cardiovascular risks for perioperative complications:   - No serious cardiac risks = 0 points     RCRI Interpretation: 0 points: Class I (very low risk - 0.4% complication rate)         Signed Electronically by: Rashi Nichols MD, MD  Copy of this evaluation report is provided to requesting physician.

## 2023-10-03 LAB
ATRIAL RATE - MUSE: 67 BPM
DIASTOLIC BLOOD PRESSURE - MUSE: NORMAL MMHG
INTERPRETATION ECG - MUSE: NORMAL
P AXIS - MUSE: 49 DEGREES
PR INTERVAL - MUSE: 180 MS
QRS DURATION - MUSE: 110 MS
QT - MUSE: 430 MS
QTC - MUSE: 454 MS
R AXIS - MUSE: 6 DEGREES
SYSTOLIC BLOOD PRESSURE - MUSE: NORMAL MMHG
T AXIS - MUSE: 42 DEGREES
VENTRICULAR RATE- MUSE: 67 BPM

## 2023-10-05 DIAGNOSIS — I10 BENIGN ESSENTIAL HYPERTENSION: ICD-10-CM

## 2023-10-05 DIAGNOSIS — E11.9 TYPE 2 DIABETES MELLITUS WITHOUT COMPLICATION, WITHOUT LONG-TERM CURRENT USE OF INSULIN (H): Primary | ICD-10-CM

## 2023-10-05 DIAGNOSIS — F10.20 UNCOMPLICATED ALCOHOL DEPENDENCE (H): ICD-10-CM

## 2023-10-06 ENCOUNTER — LAB (OUTPATIENT)
Dept: LAB | Facility: CLINIC | Age: 73
End: 2023-10-06
Payer: MEDICARE

## 2023-10-06 DIAGNOSIS — I10 BENIGN ESSENTIAL HYPERTENSION: ICD-10-CM

## 2023-10-06 DIAGNOSIS — F10.20 UNCOMPLICATED ALCOHOL DEPENDENCE (H): ICD-10-CM

## 2023-10-06 DIAGNOSIS — E11.9 TYPE 2 DIABETES MELLITUS WITHOUT COMPLICATION, WITHOUT LONG-TERM CURRENT USE OF INSULIN (H): ICD-10-CM

## 2023-10-06 LAB
ALBUMIN SERPL BCG-MCNC: 4.2 G/DL (ref 3.5–5.2)
ALP SERPL-CCNC: 104 U/L (ref 40–129)
ALT SERPL W P-5'-P-CCNC: 34 U/L (ref 0–70)
ANION GAP SERPL CALCULATED.3IONS-SCNC: 12 MMOL/L (ref 7–15)
AST SERPL W P-5'-P-CCNC: 50 U/L (ref 0–45)
BILIRUB SERPL-MCNC: 0.4 MG/DL
BUN SERPL-MCNC: 8.5 MG/DL (ref 8–23)
CALCIUM SERPL-MCNC: 9.4 MG/DL (ref 8.8–10.2)
CHLORIDE SERPL-SCNC: 102 MMOL/L (ref 98–107)
CREAT SERPL-MCNC: 0.68 MG/DL (ref 0.67–1.17)
DEPRECATED HCO3 PLAS-SCNC: 28 MMOL/L (ref 22–29)
EGFRCR SERPLBLD CKD-EPI 2021: >90 ML/MIN/1.73M2
GLUCOSE SERPL-MCNC: 164 MG/DL (ref 70–99)
POTASSIUM SERPL-SCNC: 4.1 MMOL/L (ref 3.4–5.3)
PROT SERPL-MCNC: 7.4 G/DL (ref 6.4–8.3)
SODIUM SERPL-SCNC: 142 MMOL/L (ref 135–145)

## 2023-10-06 PROCEDURE — 36415 COLL VENOUS BLD VENIPUNCTURE: CPT

## 2023-10-06 PROCEDURE — 80053 COMPREHEN METABOLIC PANEL: CPT

## 2023-11-03 ENCOUNTER — OFFICE VISIT (OUTPATIENT)
Dept: FAMILY MEDICINE | Facility: CLINIC | Age: 73
End: 2023-11-03
Payer: MEDICARE

## 2023-11-03 VITALS
DIASTOLIC BLOOD PRESSURE: 68 MMHG | HEIGHT: 69 IN | TEMPERATURE: 98.2 F | HEART RATE: 72 BPM | RESPIRATION RATE: 18 BRPM | BODY MASS INDEX: 38.08 KG/M2 | SYSTOLIC BLOOD PRESSURE: 124 MMHG | OXYGEN SATURATION: 98 % | WEIGHT: 257.1 LBS

## 2023-11-03 DIAGNOSIS — M17.11 PRIMARY OSTEOARTHRITIS OF RIGHT KNEE: ICD-10-CM

## 2023-11-03 DIAGNOSIS — E11.9 TYPE 2 DIABETES MELLITUS WITHOUT COMPLICATION, WITHOUT LONG-TERM CURRENT USE OF INSULIN (H): ICD-10-CM

## 2023-11-03 DIAGNOSIS — Z01.818 PREOP GENERAL PHYSICAL EXAM: Primary | ICD-10-CM

## 2023-11-03 DIAGNOSIS — I10 BENIGN ESSENTIAL HYPERTENSION: ICD-10-CM

## 2023-11-03 DIAGNOSIS — E66.01 MORBID OBESITY (H): ICD-10-CM

## 2023-11-03 DIAGNOSIS — G47.33 OSA (OBSTRUCTIVE SLEEP APNEA): ICD-10-CM

## 2023-11-03 DIAGNOSIS — F10.20 UNCOMPLICATED ALCOHOL DEPENDENCE (H): ICD-10-CM

## 2023-11-03 DIAGNOSIS — Z87.39 HISTORY OF GOUT: ICD-10-CM

## 2023-11-03 LAB
ANION GAP SERPL CALCULATED.3IONS-SCNC: 10 MMOL/L (ref 7–15)
BUN SERPL-MCNC: 9.9 MG/DL (ref 8–23)
CALCIUM SERPL-MCNC: 9.1 MG/DL (ref 8.8–10.2)
CHLORIDE SERPL-SCNC: 105 MMOL/L (ref 98–107)
CREAT SERPL-MCNC: 0.68 MG/DL (ref 0.67–1.17)
DEPRECATED HCO3 PLAS-SCNC: 27 MMOL/L (ref 22–29)
EGFRCR SERPLBLD CKD-EPI 2021: >90 ML/MIN/1.73M2
ERYTHROCYTE [DISTWIDTH] IN BLOOD BY AUTOMATED COUNT: 13.8 % (ref 10–15)
GLUCOSE SERPL-MCNC: 168 MG/DL (ref 70–99)
HCT VFR BLD AUTO: 46.6 % (ref 40–53)
HGB BLD-MCNC: 14.8 G/DL (ref 13.3–17.7)
MCH RBC QN AUTO: 32.7 PG (ref 26.5–33)
MCHC RBC AUTO-ENTMCNC: 31.8 G/DL (ref 31.5–36.5)
MCV RBC AUTO: 103 FL (ref 78–100)
PLATELET # BLD AUTO: 161 10E3/UL (ref 150–450)
POTASSIUM SERPL-SCNC: 4.4 MMOL/L (ref 3.4–5.3)
RBC # BLD AUTO: 4.52 10E6/UL (ref 4.4–5.9)
SODIUM SERPL-SCNC: 142 MMOL/L (ref 135–145)
WBC # BLD AUTO: 7.1 10E3/UL (ref 4–11)

## 2023-11-03 PROCEDURE — 36415 COLL VENOUS BLD VENIPUNCTURE: CPT | Performed by: FAMILY MEDICINE

## 2023-11-03 PROCEDURE — 99214 OFFICE O/P EST MOD 30 MIN: CPT | Performed by: FAMILY MEDICINE

## 2023-11-03 PROCEDURE — 85027 COMPLETE CBC AUTOMATED: CPT | Performed by: FAMILY MEDICINE

## 2023-11-03 PROCEDURE — 80048 BASIC METABOLIC PNL TOTAL CA: CPT | Performed by: FAMILY MEDICINE

## 2023-11-03 RX ORDER — LANCETS 33 GAUGE
1 EACH MISCELLANEOUS DAILY
COMMUNITY
Start: 2023-09-27 | End: 2024-04-26

## 2023-11-03 ASSESSMENT — PAIN SCALES - GENERAL: PAINLEVEL: MODERATE PAIN (4)

## 2023-11-03 NOTE — PROGRESS NOTES
[facility]  [address]  [facility] [address]  Phone: [phone]  Fax: [phone]  Primary Provider: Rashi Muro  Pre-op Performing Provider: RASHI MURO      PREOPERATIVE EVALUATION:  Today's date: 11/3/2023    Christopher is a 73 year old male who presents for a preoperative evaluation.      Surgical Information:  Surgery/Procedure: RIGHT TOTAL KNEE ARTHROPLASTY   Surgery Location:   Surgeon: Dr Ajay Pendleton  Surgery Date: 11/16/23  Time of Surgery: 9:35 am  Where patient plans to recover: At home with family  Fax number for surgical facility: Note does not need to be faxed, will be available electronically in Epic.    Assessment & Plan     The proposed surgical procedure is considered INTERMEDIATE risk.    Christopher was seen today for recheck medication and pre-op exam.    Diagnoses and all orders for this visit:    Preop general physical exam    Primary osteoarthritis of right knee    Type 2 diabetes mellitus without complication, without long-term current use of insulin (H)    Benign essential hypertension    Uncomplicated alcohol dependence (H)    History of gout    TERESA (obstructive sleep apnea)    Obesity (BMI 35.0-39.9) with comorbidity (H)          - No identified additional risk factors other than previously addressed    Antiplatelet or Anticoagulation Medication Instructions:   - Patient is on no antiplatelet or anticoagulation medications.    Additional Medication Instructions:   - ACE/ARB: HOLD on day of surgery (minimum 11 hours for general anesthesia).   - Calcium Channel Blockers: May be continued on the day of surgery.   - metformin: HOLD day of surgery.   - SSRIs, SNRIs, TCAs, Antipsychotics: Continue without modification.     RECOMMENDATION:  APPROVAL GIVEN to proceed with proposed procedure, without further diagnostic evaluation.        Subjective       HPI related to upcoming procedure:     He has severe symptomatic right knee osteoarthritis and has  exhausted conservative means for treatment and is now scheduled for knee arthroplasty.     He has diabetes current A1c 6.8 (09/11/2023).  Reports no new or worsened concerns since A1C was obtained.     Blood pressure is controlled.     He has normal renal function.     He drinks alcohol regularly is working to cut back. Drinks approximately 5-6 glasses of wine on a nightly basis (reduced from 7 glasses,  1 month ago) and is working to further reduce alcohol consumption. No history of withdrawal symptoms.     He has a history of gout with no recent gout attacks. Remains on suppression therapy.     He has NO history of heart disease or other vascular disease.     He has a history of obstructive sleep apnea, he states he was placed on BiPAP and he could not handle wearing any masks so he is untreated currently.     He reports no signs or symptoms of an acute illness.     He reports no history of any significant problems or complications related to anesthesia.        10/27/2023     9:29 AM   Preop Questions   1. Have you ever had a heart attack or stroke? No   2. Have you ever had surgery on your heart or blood vessels, such as a stent placement, a coronary artery bypass, or surgery on an artery in your head, neck, heart, or legs? No   3. Do you have chest pain with activity? No   4. Do you have a history of  heart failure? No   5. Do you currently have a cold, bronchitis or symptoms of other infection? No   6. Do you have a cough, shortness of breath, or wheezing? No   7. Do you or anyone in your family have previous history of blood clots? Sister with history of stroke - no known DVT, PE or clotting disorder   8. Do you or does anyone in your family have a serious bleeding problem such as prolonged bleeding following surgeries or cuts? No   9. Have you ever had problems with anemia or been told to take iron pills? No   10. Have you had any abnormal blood loss such as black, tarry or bloody stools? No   11. Have you  ever had a blood transfusion? No   12. Are you willing to have a blood transfusion if it is medically needed before, during, or after your surgery? Yes   13. Have you or any of your relatives ever had problems with anesthesia? No   14. Do you have sleep apnea, excessive snoring or daytime drowsiness? YES   14a. Do you have a CPAP machine? No   15. Do you have any artifical heart valves or other implanted medical devices like a pacemaker, defibrillator, or continuous glucose monitor? No   16. Do you have artificial joints? No   17. Are you allergic to latex? No       Health Care Directive:  Patient does not have a Health Care Directive or Living Will:     Preoperative Review of :   reviewed - controlled substances reflected in medication list.      Status of Chronic Conditions:  See problem list for active medical problems.  Problems all longstanding and stable, except as noted/documented.  See ROS for pertinent symptoms related to these conditions.    Review of Systems  Complete review of systems is obtained.  Other than the specific considerations noted above complete review of systems is negative.      Patient Active Problem List    Diagnosis Date Noted    Alcohol dependence (H) 09/30/2022     Priority: Medium    Type 2 diabetes mellitus without complication, without long-term current use of insulin (H) 07/07/2020     Priority: Medium    Hx of gout 01/10/2019     Priority: Medium     Uric acid level 5.5 in 2017        Obesity hypoventilation syndrome (H) 01/08/2019     Priority: Medium    Restrictive lung disease 01/08/2019     Priority: Medium    Hearing problem of both ears 01/08/2019     Priority: Medium     Saw audiology 2017 - rec f/u 3 yrs        Multiple thyroid nodules 01/08/2019     Priority: Medium     Noted on thyroid US 2014 - 0.6 cm right nodule, 0.4 cm left nodule - rec   f/u 6-12 months        Abnormal liver ultrasound 01/08/2019     Priority: Medium     Changes c/w hepatocellular disease vs  fatty liver        Hx of colonic polyps 01/08/2019     Priority: Medium     Colonoscopy 2016 - 2 hyperplastic polyps - likely f/u 5 yrs        Elevated triglycerides with high cholesterol 12/04/2018     Priority: Medium    Benign essential hypertension 12/04/2018     Priority: Medium     Coronary calcium score 133 in 2017        Other emphysema (H) 12/04/2018     Priority: Medium     Hx following with pulmonology, [facility]        Recurrent major depressive disorder, in full remission (H24) 12/04/2018     Priority: Medium    SVT (supraventricular tachycardia) 12/04/2018     Priority: Medium     Hx 2 episodes in past year, now on diltiazem for this        TERESA (obstructive sleep apnea) 12/04/2018     Priority: Medium     Has BIPAP machine        Bilateral primary osteoarthritis of knee 12/04/2018     Priority: Medium    Obesity (BMI 35.0-39.9) with comorbidity (H) 12/04/2018     Priority: Medium      Past Medical History:   Diagnosis Date    Arrhythmia     Arthritis     Benign essential hypertension 12/04/2018    Diabetes (H)     Elevated triglycerides with high cholesterol 12/04/2018    TERESA (obstructive sleep apnea) 12/04/2018    Has BIPAP machine    Other emphysema (H) 12/04/2018    Hx following with pulmonology, [facility]    Patellofemoral disorder of both knees 12/04/2018    Prediabetes 12/04/2018    Recurrent major depressive disorder, in full remission (H24) 12/04/2018    Spinal headache     SVT (supraventricular tachycardia) 12/04/2018    Hx 2 episodes in past year, now on diltiazem for this     Past Surgical History:   Procedure Laterality Date    HEMORRHOIDECTOMY EXTERNAL  2004    INCISION AND DRAINAGE PERIRECTAL ABSCESS       Current Outpatient Medications   Medication Sig Dispense Refill    alcohol swab prep pads Use to swab area of injection/malia as directed. 100 each 6    allopurinol (ZYLOPRIM) 100 MG tablet TAKE ONE TABLET BY MOUTH EVERY DAY 90 tablet 1    atorvastatin (LIPITOR) 20 MG tablet TAKE ONE  TABLET BY MOUTH EVERY DAY AT BEDTIME 90 tablet 3    blood glucose (NO BRAND SPECIFIED) lancets standard Use to test blood sugar 1 times daily or as directed. 100 each 1    blood glucose (NO BRAND SPECIFIED) test strip Use to test blood sugar 1 times daily or as directed. OneTouch Verio test strips 100 strip 4    blood glucose (NO BRAND SPECIFIED) test strip Use to test blood sugar 1 times daily or as directed. 100 strip 1    blood glucose (ONE TOUCH DELICA) lancing device Lancing device to be used with lancets. 1 each 0    blood glucose monitoring (NO BRAND SPECIFIED) meter device kit Use to test blood sugar 1 times daily or as directed. 1 kit 1    blood glucose monitoring (ONETOUCH VERIO) meter device kit Use to test blood sugar 1 times daily or as directed. 1 kit 0    buPROPion (WELLBUTRIN XL) 150 MG 24 hr tablet TAKE ONE TABLET BY MOUTH EVERY DAY 90 tablet 1    cefadroxil (DURICEF) 500 MG capsule take one capsule by mouth twice a day for 7 days      diltiazem ER COATED BEADS (CARDIZEM CD/CARTIA XT) 180 MG 24 hr capsule TAKE ONE CAPSULE BY MOUTH EVERY DAY 90 capsule 3    fenofibrate (TRICOR) 48 MG tablet Take 1 tablet (48 mg) by mouth daily 90 tablet 3    FLUoxetine (PROZAC) 20 MG capsule TAKE ONE CAPSULE BY MOUTH EVERY DAY 90 capsule 0    hydrOXYzine (ATARAX) 10 MG tablet Take 10 mg by mouth 4 times daily as needed      Lancets (ONETOUCH DELICA PLUS [id number]) MISC Inject 1 Lancet Subcutaneous daily      losartan (COZAAR) 50 MG tablet TAKE ONE TABLET BY MOUTH EVERY DAY 90 tablet 2    mupirocin (BACTROBAN) 2 % external ointment SWAB EACH NOSTRIL TWO TIMES A DAY , BEGIN 5 DAYS PRIOR TO SURGERY.      OLANZapine (ZYPREXA) 5 MG tablet TAKE TWO TABLETS BY MOUTH EVERY  tablet 1    ondansetron (ZOFRAN ODT) 4 MG ODT tab DISSOLVE ONE TABLET BY MOUTH FOUR TIMES A DAY AS NEEDED FOR FOR NAUSEA      metFORMIN (GLUCOPHAGE) 500 MG tablet Take 1 tablet (500 mg) by mouth 2 times daily (with meals) (Patient not taking:  "Reported on 11/3/2023) 180 tablet 1    oxyCODONE (ROXICODONE) 5 MG tablet TAKE ONE TO TWO TABLETS BY MOUTH EVERY 4 HOURS AS NEEDED FOR PAIN MAX 6TABS/DAY (Patient not taking: Reported on 11/3/2023)         No Known Allergies     Social History     Tobacco Use    Smoking status: Former     Packs/day: 1.00     Years: 40.00     Additional pack years: 0.00     Total pack years: 40.00     Types: Cigarettes     Quit date: 2014     Years since quittin.8    Smokeless tobacco: Never    Tobacco comments:     still using e-cigs at lowest level, trying to quit   Substance Use Topics    Alcohol use: Yes     Alcohol/week: 56.0 standard drinks of alcohol     Types: 56 Glasses of wine per week     Comment: 21-28 per week     Family History   Problem Relation Age of Onset    Pancreatic Cancer Mother 62.00    Depression Mother     Cancer Father 88.00        gallbladder    Diabetes Sister     Depression Sister     No Known Problems Brother     Heart Failure Maternal Grandmother          early 70s    Cerebrovascular Disease Maternal Grandfather          in 80s    Heart Failure Paternal Grandmother          in 90s    Coronary Artery Disease Paternal Grandfather         late 60s    Heart Failure Paternal Grandfather     No Known Problems Brother      History   Drug Use No         Objective     /68   Pulse 72   Temp 98.2  F (36.8  C)   Resp 18   Ht 1.753 m (5' 9\")   Wt 116.6 kg (257 lb 1.6 oz)   SpO2 98%   BMI 37.97 kg/m      Physical Exam        General Appearance:    Alert, cooperative, no distress   Eyes:   No scleral icterus or conjunctival irritation       Ears:    Normal TM's and external ear canals, both ears   Throat:   Lips, mucosa, and tongue normal; teeth and gums normal   Neck:   Supple, symmetrical, trachea midline, no adenopathy;        thyroid:  No enlargement/tenderness/nodules   Lungs:     Clear to auscultation bilaterally, respirations unlabored, no wheezes or crackles   Heart:    Regular " rate and rhythm,  No murmur   Abdomen:    Soft, no distention, no tenderness on palpation, no masses, no organomegaly     Extremities:  No edema, no joint swelling or redness, no evidence of any injuries   Skin:  No concerning skin findings, no suspicious moles, no rashes   Neurologic:  On gross examination there is no motor or sensory deficit.  Patient walks with a normal gait       Recent Labs   Lab Test 10/06/23  0959 10/02/23  1213 09/11/23  1324 03/02/23  0942   HGB  --  13.8 14.9 14.4   PLT  --  180 169 181    143 142 140   POTASSIUM 4.1 4.5 4.4 4.2   CR 0.68 0.71 0.77 0.75   A1C  --   --  6.8* 6.5*        Diagnostics:  Labs pending at this time.  Results will be reviewed when available.   No EKG this visit, completed in the last 90 days.    Revised Cardiac Risk Index (RCRI):  The patient has the following serious cardiovascular risks for perioperative complications:   - No serious cardiac risks = 0 points     RCRI Interpretation: 0 points: Class I (very low risk - 0.4% complication rate)      Signed Electronically by: Rashi Nichols MD, MD  Copy of this evaluation report is provided to requesting physician.

## 2023-11-03 NOTE — H&P (VIEW-ONLY)
[facility]  [address]  [facility] [address]  Phone: [phone]  Fax: [phone]  Primary Provider: Rashi Muro  Pre-op Performing Provider: RASHI MURO      PREOPERATIVE EVALUATION:  Today's date: 11/3/2023    Christopher is a 73 year old male who presents for a preoperative evaluation.      Surgical Information:  Surgery/Procedure: RIGHT TOTAL KNEE ARTHROPLASTY   Surgery Location:   Surgeon: Dr Ajay Pendleton  Surgery Date: 11/16/23  Time of Surgery: 9:35 am  Where patient plans to recover: At home with family  Fax number for surgical facility: Note does not need to be faxed, will be available electronically in Epic.    Assessment & Plan     The proposed surgical procedure is considered INTERMEDIATE risk.    Christopher was seen today for recheck medication and pre-op exam.    Diagnoses and all orders for this visit:    Preop general physical exam    Primary osteoarthritis of right knee    Type 2 diabetes mellitus without complication, without long-term current use of insulin (H)    Benign essential hypertension    Uncomplicated alcohol dependence (H)    History of gout    TERESA (obstructive sleep apnea)    Obesity (BMI 35.0-39.9) with comorbidity (H)          - No identified additional risk factors other than previously addressed    Antiplatelet or Anticoagulation Medication Instructions:   - Patient is on no antiplatelet or anticoagulation medications.    Additional Medication Instructions:   - ACE/ARB: HOLD on day of surgery (minimum 11 hours for general anesthesia).   - Calcium Channel Blockers: May be continued on the day of surgery.   - metformin: HOLD day of surgery.   - SSRIs, SNRIs, TCAs, Antipsychotics: Continue without modification.     RECOMMENDATION:  APPROVAL GIVEN to proceed with proposed procedure, without further diagnostic evaluation.        Subjective       HPI related to upcoming procedure:     He has severe symptomatic right knee osteoarthritis and has  exhausted conservative means for treatment and is now scheduled for knee arthroplasty.     He has diabetes current A1c 6.8 (09/11/2023).  Reports no new or worsened concerns since A1C was obtained.     Blood pressure is controlled.     He has normal renal function.     He drinks alcohol regularly is working to cut back. Drinks approximately 5-6 glasses of wine on a nightly basis (reduced from 7 glasses,  1 month ago) and is working to further reduce alcohol consumption. No history of withdrawal symptoms.     He has a history of gout with no recent gout attacks. Remains on suppression therapy.     He has NO history of heart disease or other vascular disease.     He has a history of obstructive sleep apnea, he states he was placed on BiPAP and he could not handle wearing any masks so he is untreated currently.     He reports no signs or symptoms of an acute illness.     He reports no history of any significant problems or complications related to anesthesia.        10/27/2023     9:29 AM   Preop Questions   1. Have you ever had a heart attack or stroke? No   2. Have you ever had surgery on your heart or blood vessels, such as a stent placement, a coronary artery bypass, or surgery on an artery in your head, neck, heart, or legs? No   3. Do you have chest pain with activity? No   4. Do you have a history of  heart failure? No   5. Do you currently have a cold, bronchitis or symptoms of other infection? No   6. Do you have a cough, shortness of breath, or wheezing? No   7. Do you or anyone in your family have previous history of blood clots? Sister with history of stroke - no known DVT, PE or clotting disorder   8. Do you or does anyone in your family have a serious bleeding problem such as prolonged bleeding following surgeries or cuts? No   9. Have you ever had problems with anemia or been told to take iron pills? No   10. Have you had any abnormal blood loss such as black, tarry or bloody stools? No   11. Have you  ever had a blood transfusion? No   12. Are you willing to have a blood transfusion if it is medically needed before, during, or after your surgery? Yes   13. Have you or any of your relatives ever had problems with anesthesia? No   14. Do you have sleep apnea, excessive snoring or daytime drowsiness? YES   14a. Do you have a CPAP machine? No   15. Do you have any artifical heart valves or other implanted medical devices like a pacemaker, defibrillator, or continuous glucose monitor? No   16. Do you have artificial joints? No   17. Are you allergic to latex? No       Health Care Directive:  Patient does not have a Health Care Directive or Living Will:     Preoperative Review of :   reviewed - controlled substances reflected in medication list.      Status of Chronic Conditions:  See problem list for active medical problems.  Problems all longstanding and stable, except as noted/documented.  See ROS for pertinent symptoms related to these conditions.    Review of Systems  Complete review of systems is obtained.  Other than the specific considerations noted above complete review of systems is negative.      Patient Active Problem List    Diagnosis Date Noted    Alcohol dependence (H) 09/30/2022     Priority: Medium    Type 2 diabetes mellitus without complication, without long-term current use of insulin (H) 07/07/2020     Priority: Medium    Hx of gout 01/10/2019     Priority: Medium     Uric acid level 5.5 in 2017        Obesity hypoventilation syndrome (H) 01/08/2019     Priority: Medium    Restrictive lung disease 01/08/2019     Priority: Medium    Hearing problem of both ears 01/08/2019     Priority: Medium     Saw audiology 2017 - rec f/u 3 yrs        Multiple thyroid nodules 01/08/2019     Priority: Medium     Noted on thyroid US 2014 - 0.6 cm right nodule, 0.4 cm left nodule - rec   f/u 6-12 months        Abnormal liver ultrasound 01/08/2019     Priority: Medium     Changes c/w hepatocellular disease vs  fatty liver        Hx of colonic polyps 01/08/2019     Priority: Medium     Colonoscopy 2016 - 2 hyperplastic polyps - likely f/u 5 yrs        Elevated triglycerides with high cholesterol 12/04/2018     Priority: Medium    Benign essential hypertension 12/04/2018     Priority: Medium     Coronary calcium score 133 in 2017        Other emphysema (H) 12/04/2018     Priority: Medium     Hx following with pulmonology, [facility]        Recurrent major depressive disorder, in full remission (H24) 12/04/2018     Priority: Medium    SVT (supraventricular tachycardia) 12/04/2018     Priority: Medium     Hx 2 episodes in past year, now on diltiazem for this        TERESA (obstructive sleep apnea) 12/04/2018     Priority: Medium     Has BIPAP machine        Bilateral primary osteoarthritis of knee 12/04/2018     Priority: Medium    Obesity (BMI 35.0-39.9) with comorbidity (H) 12/04/2018     Priority: Medium      Past Medical History:   Diagnosis Date    Arrhythmia     Arthritis     Benign essential hypertension 12/04/2018    Diabetes (H)     Elevated triglycerides with high cholesterol 12/04/2018    TERESA (obstructive sleep apnea) 12/04/2018    Has BIPAP machine    Other emphysema (H) 12/04/2018    Hx following with pulmonology, [facility]    Patellofemoral disorder of both knees 12/04/2018    Prediabetes 12/04/2018    Recurrent major depressive disorder, in full remission (H24) 12/04/2018    Spinal headache     SVT (supraventricular tachycardia) 12/04/2018    Hx 2 episodes in past year, now on diltiazem for this     Past Surgical History:   Procedure Laterality Date    HEMORRHOIDECTOMY EXTERNAL  2004    INCISION AND DRAINAGE PERIRECTAL ABSCESS       Current Outpatient Medications   Medication Sig Dispense Refill    alcohol swab prep pads Use to swab area of injection/malia as directed. 100 each 6    allopurinol (ZYLOPRIM) 100 MG tablet TAKE ONE TABLET BY MOUTH EVERY DAY 90 tablet 1    atorvastatin (LIPITOR) 20 MG tablet TAKE ONE  TABLET BY MOUTH EVERY DAY AT BEDTIME 90 tablet 3    blood glucose (NO BRAND SPECIFIED) lancets standard Use to test blood sugar 1 times daily or as directed. 100 each 1    blood glucose (NO BRAND SPECIFIED) test strip Use to test blood sugar 1 times daily or as directed. OneTouch Verio test strips 100 strip 4    blood glucose (NO BRAND SPECIFIED) test strip Use to test blood sugar 1 times daily or as directed. 100 strip 1    blood glucose (ONE TOUCH DELICA) lancing device Lancing device to be used with lancets. 1 each 0    blood glucose monitoring (NO BRAND SPECIFIED) meter device kit Use to test blood sugar 1 times daily or as directed. 1 kit 1    blood glucose monitoring (ONETOUCH VERIO) meter device kit Use to test blood sugar 1 times daily or as directed. 1 kit 0    buPROPion (WELLBUTRIN XL) 150 MG 24 hr tablet TAKE ONE TABLET BY MOUTH EVERY DAY 90 tablet 1    cefadroxil (DURICEF) 500 MG capsule take one capsule by mouth twice a day for 7 days      diltiazem ER COATED BEADS (CARDIZEM CD/CARTIA XT) 180 MG 24 hr capsule TAKE ONE CAPSULE BY MOUTH EVERY DAY 90 capsule 3    fenofibrate (TRICOR) 48 MG tablet Take 1 tablet (48 mg) by mouth daily 90 tablet 3    FLUoxetine (PROZAC) 20 MG capsule TAKE ONE CAPSULE BY MOUTH EVERY DAY 90 capsule 0    hydrOXYzine (ATARAX) 10 MG tablet Take 10 mg by mouth 4 times daily as needed      Lancets (ONETOUCH DELICA PLUS [id number]) MISC Inject 1 Lancet Subcutaneous daily      losartan (COZAAR) 50 MG tablet TAKE ONE TABLET BY MOUTH EVERY DAY 90 tablet 2    mupirocin (BACTROBAN) 2 % external ointment SWAB EACH NOSTRIL TWO TIMES A DAY , BEGIN 5 DAYS PRIOR TO SURGERY.      OLANZapine (ZYPREXA) 5 MG tablet TAKE TWO TABLETS BY MOUTH EVERY  tablet 1    ondansetron (ZOFRAN ODT) 4 MG ODT tab DISSOLVE ONE TABLET BY MOUTH FOUR TIMES A DAY AS NEEDED FOR FOR NAUSEA      metFORMIN (GLUCOPHAGE) 500 MG tablet Take 1 tablet (500 mg) by mouth 2 times daily (with meals) (Patient not taking:  "Reported on 11/3/2023) 180 tablet 1    oxyCODONE (ROXICODONE) 5 MG tablet TAKE ONE TO TWO TABLETS BY MOUTH EVERY 4 HOURS AS NEEDED FOR PAIN MAX 6TABS/DAY (Patient not taking: Reported on 11/3/2023)         No Known Allergies     Social History     Tobacco Use    Smoking status: Former     Packs/day: 1.00     Years: 40.00     Additional pack years: 0.00     Total pack years: 40.00     Types: Cigarettes     Quit date: 2014     Years since quittin.8    Smokeless tobacco: Never    Tobacco comments:     still using e-cigs at lowest level, trying to quit   Substance Use Topics    Alcohol use: Yes     Alcohol/week: 56.0 standard drinks of alcohol     Types: 56 Glasses of wine per week     Comment: 21-28 per week     Family History   Problem Relation Age of Onset    Pancreatic Cancer Mother 62.00    Depression Mother     Cancer Father 88.00        gallbladder    Diabetes Sister     Depression Sister     No Known Problems Brother     Heart Failure Maternal Grandmother          early 70s    Cerebrovascular Disease Maternal Grandfather          in 80s    Heart Failure Paternal Grandmother          in 90s    Coronary Artery Disease Paternal Grandfather         late 60s    Heart Failure Paternal Grandfather     No Known Problems Brother      History   Drug Use No         Objective     /68   Pulse 72   Temp 98.2  F (36.8  C)   Resp 18   Ht 1.753 m (5' 9\")   Wt 116.6 kg (257 lb 1.6 oz)   SpO2 98%   BMI 37.97 kg/m      Physical Exam        General Appearance:    Alert, cooperative, no distress   Eyes:   No scleral icterus or conjunctival irritation       Ears:    Normal TM's and external ear canals, both ears   Throat:   Lips, mucosa, and tongue normal; teeth and gums normal   Neck:   Supple, symmetrical, trachea midline, no adenopathy;        thyroid:  No enlargement/tenderness/nodules   Lungs:     Clear to auscultation bilaterally, respirations unlabored, no wheezes or crackles   Heart:    Regular " rate and rhythm,  No murmur   Abdomen:    Soft, no distention, no tenderness on palpation, no masses, no organomegaly     Extremities:  No edema, no joint swelling or redness, no evidence of any injuries   Skin:  No concerning skin findings, no suspicious moles, no rashes   Neurologic:  On gross examination there is no motor or sensory deficit.  Patient walks with a normal gait       Recent Labs   Lab Test 10/06/23  0959 10/02/23  1213 09/11/23  1324 03/02/23  0942   HGB  --  13.8 14.9 14.4   PLT  --  180 169 181    143 142 140   POTASSIUM 4.1 4.5 4.4 4.2   CR 0.68 0.71 0.77 0.75   A1C  --   --  6.8* 6.5*        Diagnostics:  Labs pending at this time.  Results will be reviewed when available.   No EKG this visit, completed in the last 90 days.    Revised Cardiac Risk Index (RCRI):  The patient has the following serious cardiovascular risks for perioperative complications:   - No serious cardiac risks = 0 points     RCRI Interpretation: 0 points: Class I (very low risk - 0.4% complication rate)      Signed Electronically by: Rashi Nichols MD, MD  Copy of this evaluation report is provided to requesting physician.

## 2023-11-07 NOTE — PROGRESS NOTES
Discharge plan according to [facility]:       10/11/23 1052   Discharge Planning   Patient/Family Anticipates Transition to home with family   Living Arrangements   People in Home spouse   Type of Residence Private Residence   Is your private residence a single family home or apartment? Single family home   Number of Stairs, Within Home, Primary ten   Stair Railings, Within Home, Primary railings safe and in good condition   Once home, are you able to live on one level? Yes   Which level? Main Level   Bathroom Shower/Tub Walk-in shower   Equipment Currently Used at Home raised toilet seat   Support System   Support Systems Spouse/Significant Other   Do you have someone available to stay with you one or two nights once you are home? Yes

## 2023-11-13 NOTE — PROVIDER NOTIFICATION
I am evaluating this patient for upcoming Right Total Knee Arthroplasty with Dr. Pendleton at [facility] on 11/16/23:    - Reviewed preop H&P and labs. Patient cleared for surgery by PCP but was noted to have alcohol dependence and consumes 5-6 glasses of wine each night. PCP did advise patient to cut back on alcohol use prior to surgery and patient says he has been cutting back. Notified Dr. Pendleton of above information and asked to him to notify us if any concerns about proceeding. Full Treatment Plan note to follow.       SULLY Davis, CNP   Advanced Practice Nurse Navigator- Orthopedics  [facility]   Office Phone: [phone]  Direct Fax: [phone]

## 2023-11-13 NOTE — TREATMENT PLAN
Orthopedic Surgery Pre-Op Plan: Christopher Lindsey  pre-op review. This is NOT an H&P   Surgeon: Dr. Pendleton   [facility]: [facility]  Name of Surgery: Right Total Knee Arthroplasty  Date of Surgery: 11/16/23  H&P: Completed on 11/3/23 by Dr. Rashi Nichols at [facility].   History of ASA, NSAIDS, vitamin and/or herbal supplements, GLP-1 Agonist medication taken within 10 days?: No  History of blood thinners?: No    Plan:   1) Discharge Plan: Home morning of POD 1 with assist of Spouse. Please see Discharge Planning section near bottom of this note for further details.     2) History of SVT: reportedly improved since being on diltiazem and no longer follows with Cardiology.  Most recent ECG on 10/2/2023 showed sinus rhythm with heart rate of 67 bpm.    3) Hypercholesterolemia and Hypertriglyceridemia: On atorvastatin and fenofibrate.    4) Hypertension: Well-controlled on losartan and diltiazem.  Patient instructed to hold losartan on the morning of surgery but to continue taking diltiazem.    5) Emphysema: Stable.  Does not require medication or supplemental oxygen.    6) Obstructive Sleep Apnea: had BiPap but unable to tolerate mask. No longer using BiPap.  I recommend close monitoring of postop respiratory status.  If frequent O2 desats or apneic episodes, nursing to please notify Hospitalist.    7) Type 2 Diabetes Mellitus: Good control.  Hemoglobin A1c 6.8 on 9/11/2023.  On metformin. I recommend blood glucose checks at least three times a day and at bedtime during hospital stay. Goal BG < 180 to decrease risk for infection and wound healing complications. Nursing to please notify Hospitalist if BG > 180.      8) Alcohol Dependence: Uncomplicated: consumes 5-6 glasses of wine each night. PCP did advise patient to cut back on alcohol use prior to surgery and patient says he has been cutting back. Notified Dr. Pendleton of above information and asked to him to notify us if any concerns about  proceeding. Recommend monitoring for any signs/symptoms of alcohol withdrawal. Implement withdrawal protocol as needed.      9) Depression: On fluoxetine, bupropion and olanzapine.     10) Obesity: BMI 37.9, Wt: 257 lbs at preop exam.  I recommend continued efforts at safe weight loss following recovery from surgery.    Patient appears medically stable for upcoming surgery.  I would recommend Hospitalist Consult to assist with medical management. Please call me below with any questions on this patient.       Review of Systems Notable for: History of SVT-improved on diltiazem, hypercholesterolemia, hypertriglyceridemia, hypertension, emphysema, obstructive sleep apnea-does not use BiPAP or CPAP-unable to tolerate mask, type 2 diabetes mellitus-good control, alcohol [id number] 5-6 glasses of wine each night, depression, obesity.    Past Medical History:   Past Medical History:   Diagnosis Date    Arrhythmia     Arthritis     Benign essential hypertension 12/04/2018    Diabetes (H)     Elevated triglycerides with high cholesterol 12/04/2018    TERESA (obstructive sleep apnea) 12/04/2018    Has BIPAP machine    Other emphysema (H) 12/04/2018    Hx following with pulmonology, [facility]    Patellofemoral disorder of both knees 12/04/2018    Prediabetes 12/04/2018    Recurrent major depressive disorder, in full remission (H24) 12/04/2018    Spinal headache     SVT (supraventricular tachycardia) 12/04/2018    Hx 2 episodes in past year, now on diltiazem for this     Past Surgical History:   Procedure Laterality Date    HEMORRHOIDECTOMY EXTERNAL  2004    INCISION AND DRAINAGE PERIRECTAL ABSCESS         Current Medications:  Patient's Medications   New Prescriptions    No medications on file   Previous Medications    ALCOHOL SWAB PREP PADS    Use to swab area of injection/malia as directed.    ALLOPURINOL (ZYLOPRIM) 100 MG TABLET    TAKE ONE TABLET BY MOUTH EVERY DAY    ATORVASTATIN (LIPITOR) 20 MG TABLET     TAKE ONE TABLET BY MOUTH EVERY DAY AT BEDTIME    BLOOD GLUCOSE (NO BRAND SPECIFIED) LANCETS STANDARD    Use to test blood sugar 1 times daily or as directed.    BLOOD GLUCOSE (NO BRAND SPECIFIED) TEST STRIP    Use to test blood sugar 1 times daily or as directed.    BLOOD GLUCOSE (NO BRAND SPECIFIED) TEST STRIP    Use to test blood sugar 1 times daily or as directed. OneTouch Verio test strips    BLOOD GLUCOSE (ONE TOUCH DELICA) LANCING DEVICE    Lancing device to be used with lancets.    BLOOD GLUCOSE MONITORING (NO BRAND SPECIFIED) METER DEVICE KIT    Use to test blood sugar 1 times daily or as directed.    BLOOD GLUCOSE MONITORING (ONETOUCH VERIO) METER DEVICE KIT    Use to test blood sugar 1 times daily or as directed.    BUPROPION (WELLBUTRIN XL) 150 MG 24 HR TABLET    TAKE ONE TABLET BY MOUTH EVERY DAY    CEFADROXIL (DURICEF) 500 MG CAPSULE    take one capsule by mouth twice a day for 7 days    DILTIAZEM ER COATED BEADS (CARDIZEM CD/CARTIA XT) 180 MG 24 HR CAPSULE    TAKE ONE CAPSULE BY MOUTH EVERY DAY    FENOFIBRATE (TRICOR) 48 MG TABLET    Take 1 tablet (48 mg) by mouth daily    FLUOXETINE (PROZAC) 20 MG CAPSULE    TAKE ONE CAPSULE BY MOUTH EVERY DAY    HYDROXYZINE (ATARAX) 10 MG TABLET    Take 10 mg by mouth 4 times daily as needed    LANCETS (ONETOUCH DELICA PLUS [id number]) MISC    Inject 1 Lancet Subcutaneous daily    LOSARTAN (COZAAR) 50 MG TABLET    TAKE ONE TABLET BY MOUTH EVERY DAY    METFORMIN (GLUCOPHAGE) 500 MG TABLET    Take 1 tablet (500 mg) by mouth 2 times daily (with meals)    MUPIROCIN (BACTROBAN) 2 % EXTERNAL OINTMENT    SWAB EACH NOSTRIL TWO TIMES A DAY , BEGIN 5 DAYS PRIOR TO SURGERY.    OLANZAPINE (ZYPREXA) 5 MG TABLET    TAKE TWO TABLETS BY MOUTH EVERY DAY    ONDANSETRON (ZOFRAN ODT) 4 MG ODT TAB    DISSOLVE ONE TABLET BY MOUTH FOUR TIMES A DAY AS NEEDED FOR FOR NAUSEA    OXYCODONE (ROXICODONE) 5 MG TABLET    TAKE ONE TO TWO TABLETS BY MOUTH EVERY 4 HOURS AS NEEDED FOR PAIN MAX  6TABS/DAY   Modified Medications    No medications on file   Discontinued Medications    No medications on file       ALLERGIES:  No Known Allergies    Social History  Social History     Tobacco Use    Smoking status: Former     Packs/day: 1.00     Years: 40.00     Additional pack years: 0.00     Total pack years: 40.00     Types: Cigarettes     Quit date: 2014     Years since quittin.8    Smokeless tobacco: Never    Tobacco comments:     still using e-cigs at lowest level, trying to quit   Vaping Use    Vaping Use: Every day   Substance Use Topics    Alcohol use: Yes     Alcohol/week: 56.0 standard drinks of alcohol     Types: 56 Glasses of wine per week     Comment: 21-28 per week    Drug use: No       Any Abnormal Recent Diagnostics? Yes  Hemoglobin A1c 6.8 on 2023: Shows good control of type 2 diabetes on metformin.  Blood glucose 168 on 11/3/2023: We will monitor BG's closely during hospital stay.  Goal BG <180.  AST 50 on 10/6/2023: Mildly elevated.  Likely related to alcohol dependence.     Discharge Planning:   Discharge plan according to [facility]:     Home morning of POD 1 with assist of Spouse     10/11/23 1052   Discharge Planning   Patient/Family Anticipates Transition to home with family   Living Arrangements   People in Home spouse   Type of Residence Private Residence   Is your private residence a single family home or apartment? Single family home   Number of Stairs, Within Home, Primary ten   Stair Railings, Within Home, Primary railings safe and in good condition   Once home, are you able to live on one level? Yes   Which level? Main Level   Bathroom Shower/Tub Walk-in shower   Equipment Currently Used at Home raised toilet seat   Support System   Support Systems Spouse/Significant Other   Do you have someone available to stay with you one or two nights once you are home? Yes       SULLY Davis, CNP   Advanced Practice Nurse Navigator- Orthopedics  [facility]  [facility]   Phone: [phone]

## 2023-11-15 ENCOUNTER — ANESTHESIA EVENT (OUTPATIENT)
Dept: SURGERY | Facility: CLINIC | Age: 73
End: 2023-11-15
Payer: MEDICARE

## 2023-11-16 ENCOUNTER — APPOINTMENT (OUTPATIENT)
Dept: PHYSICAL THERAPY | Facility: CLINIC | Age: 73
End: 2023-11-16
Attending: ORTHOPAEDIC SURGERY
Payer: MEDICARE

## 2023-11-16 ENCOUNTER — APPOINTMENT (OUTPATIENT)
Dept: RADIOLOGY | Facility: CLINIC | Age: 73
End: 2023-11-16
Attending: PHYSICIAN ASSISTANT
Payer: MEDICARE

## 2023-11-16 ENCOUNTER — ANESTHESIA (OUTPATIENT)
Dept: SURGERY | Facility: CLINIC | Age: 73
End: 2023-11-16
Payer: MEDICARE

## 2023-11-16 ENCOUNTER — HOSPITAL ENCOUNTER (OUTPATIENT)
Facility: CLINIC | Age: 73
Discharge: HOME | End: 2023-11-17
Attending: ORTHOPAEDIC SURGERY | Admitting: ORTHOPAEDIC SURGERY
Payer: MEDICARE

## 2023-11-16 DIAGNOSIS — M17.11 PRIMARY OSTEOARTHRITIS OF RIGHT KNEE: Primary | ICD-10-CM

## 2023-11-16 LAB
GLUCOSE BLDC GLUCOMTR-MCNC: 181 MG/DL (ref 70–99)
GLUCOSE BLDC GLUCOMTR-MCNC: 193 MG/DL (ref 70–99)
GLUCOSE BLDC GLUCOMTR-MCNC: 199 MG/DL (ref 70–99)
GLUCOSE BLDC GLUCOMTR-MCNC: 216 MG/DL (ref 70–99)
GLUCOSE BLDC GLUCOMTR-MCNC: 238 MG/DL (ref 70–99)

## 2023-11-16 PROCEDURE — 82962 GLUCOSE BLOOD TEST: CPT

## 2023-11-16 PROCEDURE — C1713 ANCHOR/SCREW BN/BN,TIS/BN: HCPCS | Performed by: ORTHOPAEDIC SURGERY

## 2023-11-16 PROCEDURE — 250N000012 HC RX MED GY IP 250 OP 636 PS 637: Performed by: FAMILY MEDICINE

## 2023-11-16 PROCEDURE — 250N000025 HC SEVOFLURANE, PER MIN: Performed by: ORTHOPAEDIC SURGERY

## 2023-11-16 PROCEDURE — 272N000001 HC OR GENERAL SUPPLY STERILE: Performed by: ORTHOPAEDIC SURGERY

## 2023-11-16 PROCEDURE — 258N000001 HC RX 258: Performed by: ORTHOPAEDIC SURGERY

## 2023-11-16 PROCEDURE — 97161 PT EVAL LOW COMPLEX 20 MIN: CPT | Mod: GP

## 2023-11-16 PROCEDURE — 250N000011 HC RX IP 250 OP 636: Performed by: PHYSICIAN ASSISTANT

## 2023-11-16 PROCEDURE — 97530 THERAPEUTIC ACTIVITIES: CPT | Mod: GP

## 2023-11-16 PROCEDURE — 250N000011 HC RX IP 250 OP 636: Mod: JZ | Performed by: STUDENT IN AN ORGANIZED HEALTH CARE EDUCATION/TRAINING PROGRAM

## 2023-11-16 PROCEDURE — 250N000011 HC RX IP 250 OP 636: Mod: JZ | Performed by: ANESTHESIOLOGY

## 2023-11-16 PROCEDURE — 250N000011 HC RX IP 250 OP 636: Performed by: STUDENT IN AN ORGANIZED HEALTH CARE EDUCATION/TRAINING PROGRAM

## 2023-11-16 PROCEDURE — 250N000009 HC RX 250: Performed by: STUDENT IN AN ORGANIZED HEALTH CARE EDUCATION/TRAINING PROGRAM

## 2023-11-16 PROCEDURE — 258N000003 HC RX IP 258 OP 636: Performed by: ANESTHESIOLOGY

## 2023-11-16 PROCEDURE — C1776 JOINT DEVICE (IMPLANTABLE): HCPCS | Performed by: ORTHOPAEDIC SURGERY

## 2023-11-16 PROCEDURE — 999N000141 HC STATISTIC PRE-PROCEDURE NURSING ASSESSMENT: Performed by: ORTHOPAEDIC SURGERY

## 2023-11-16 PROCEDURE — 710N000010 HC RECOVERY PHASE 1, LEVEL 2, PER MIN: Performed by: ORTHOPAEDIC SURGERY

## 2023-11-16 PROCEDURE — 250N000013 HC RX MED GY IP 250 OP 250 PS 637: Performed by: PHYSICIAN ASSISTANT

## 2023-11-16 PROCEDURE — 360N000077 HC SURGERY LEVEL 4, PER MIN: Performed by: ORTHOPAEDIC SURGERY

## 2023-11-16 PROCEDURE — 250N000013 HC RX MED GY IP 250 OP 250 PS 637: Performed by: FAMILY MEDICINE

## 2023-11-16 PROCEDURE — 999N000065 XR KNEE PORT RIGHT 1/2 VIEWS: Mod: RT

## 2023-11-16 PROCEDURE — 370N000017 HC ANESTHESIA TECHNICAL FEE, PER MIN: Performed by: ORTHOPAEDIC SURGERY

## 2023-11-16 PROCEDURE — 250N000011 HC RX IP 250 OP 636: Mod: JZ | Performed by: PHYSICIAN ASSISTANT

## 2023-11-16 PROCEDURE — 250N000013 HC RX MED GY IP 250 OP 250 PS 637: Performed by: ANESTHESIOLOGY

## 2023-11-16 PROCEDURE — 250N000009 HC RX 250: Performed by: PHYSICIAN ASSISTANT

## 2023-11-16 PROCEDURE — 258N000003 HC RX IP 258 OP 636: Performed by: PHYSICIAN ASSISTANT

## 2023-11-16 PROCEDURE — 99204 OFFICE O/P NEW MOD 45 MIN: CPT | Performed by: FAMILY MEDICINE

## 2023-11-16 DEVICE — CRUCIATE RETAINING FEMORAL
Type: IMPLANTABLE DEVICE | Site: KNEE | Status: FUNCTIONAL
Brand: TRIATHLON

## 2023-11-16 DEVICE — UNIVERSAL TIBIAL BASEPLATE
Type: IMPLANTABLE DEVICE | Site: KNEE | Status: FUNCTIONAL
Brand: TRIATHLON

## 2023-11-16 DEVICE — IMP BONE CEMENT SIMPLEX W/GENTAMICIN 6195-1-001: Type: IMPLANTABLE DEVICE | Site: KNEE | Status: FUNCTIONAL

## 2023-11-16 DEVICE — TIBIAL BEARING INSERT
Type: IMPLANTABLE DEVICE | Site: KNEE | Status: FUNCTIONAL
Brand: TRIATHLON

## 2023-11-16 DEVICE — PATELLA
Type: IMPLANTABLE DEVICE | Site: KNEE | Status: FUNCTIONAL
Brand: TRIATHLON

## 2023-11-16 RX ORDER — FENTANYL CITRATE 0.05 MG/ML
INJECTION, SOLUTION INTRAMUSCULAR; INTRAVENOUS PRN
Status: DISCONTINUED | OUTPATIENT
Start: 2023-11-16 | End: 2023-11-16

## 2023-11-16 RX ORDER — OLANZAPINE 5 MG/1
10 TABLET ORAL DAILY
Status: DISCONTINUED | OUTPATIENT
Start: 2023-11-17 | End: 2023-11-17 | Stop reason: HOSPADM

## 2023-11-16 RX ORDER — ONDANSETRON 4 MG/1
4 TABLET, ORALLY DISINTEGRATING ORAL EVERY 30 MIN PRN
Status: DISCONTINUED | OUTPATIENT
Start: 2023-11-16 | End: 2023-11-16

## 2023-11-16 RX ORDER — DILTIAZEM HYDROCHLORIDE 180 MG/1
180 CAPSULE, COATED, EXTENDED RELEASE ORAL DAILY
Status: DISCONTINUED | OUTPATIENT
Start: 2023-11-17 | End: 2023-11-17 | Stop reason: HOSPADM

## 2023-11-16 RX ORDER — ASPIRIN 81 MG/1
81 TABLET ORAL 2 TIMES DAILY
Qty: 60 TABLET | Refills: 0 | Status: SHIPPED | OUTPATIENT
Start: 2023-11-16 | End: 2024-03-06

## 2023-11-16 RX ORDER — ONDANSETRON 4 MG/1
4 TABLET, ORALLY DISINTEGRATING ORAL EVERY 6 HOURS PRN
Status: DISCONTINUED | OUTPATIENT
Start: 2023-11-16 | End: 2023-11-17 | Stop reason: HOSPADM

## 2023-11-16 RX ORDER — ONDANSETRON 2 MG/ML
INJECTION INTRAMUSCULAR; INTRAVENOUS PRN
Status: DISCONTINUED | OUTPATIENT
Start: 2023-11-16 | End: 2023-11-16

## 2023-11-16 RX ORDER — PROPOFOL 10 MG/ML
INJECTION, EMULSION INTRAVENOUS PRN
Status: DISCONTINUED | OUTPATIENT
Start: 2023-11-16 | End: 2023-11-16

## 2023-11-16 RX ORDER — HYDROXYZINE HYDROCHLORIDE 10 MG/1
10 TABLET, FILM COATED ORAL EVERY 6 HOURS PRN
Status: DISCONTINUED | OUTPATIENT
Start: 2023-11-16 | End: 2023-11-17 | Stop reason: HOSPADM

## 2023-11-16 RX ORDER — CEFAZOLIN SODIUM/WATER 2 G/20 ML
2 SYRINGE (ML) INTRAVENOUS SEE ADMIN INSTRUCTIONS
Status: DISCONTINUED | OUTPATIENT
Start: 2023-11-16 | End: 2023-11-16 | Stop reason: HOSPADM

## 2023-11-16 RX ORDER — ASPIRIN 81 MG/1
81 TABLET ORAL 2 TIMES DAILY
Status: DISCONTINUED | OUTPATIENT
Start: 2023-11-16 | End: 2023-11-17 | Stop reason: HOSPADM

## 2023-11-16 RX ORDER — NAPROXEN 250 MG/1
250 TABLET ORAL EVERY 12 HOURS PRN
Status: DISCONTINUED | OUTPATIENT
Start: 2023-11-16 | End: 2023-11-17 | Stop reason: HOSPADM

## 2023-11-16 RX ORDER — SODIUM CHLORIDE, SODIUM LACTATE, POTASSIUM CHLORIDE, CALCIUM CHLORIDE 600; 310; 30; 20 MG/100ML; MG/100ML; MG/100ML; MG/100ML
INJECTION, SOLUTION INTRAVENOUS CONTINUOUS
Status: DISCONTINUED | OUTPATIENT
Start: 2023-11-16 | End: 2023-11-16 | Stop reason: HOSPADM

## 2023-11-16 RX ORDER — NALOXONE HYDROCHLORIDE 0.4 MG/ML
0.4 INJECTION, SOLUTION INTRAMUSCULAR; INTRAVENOUS; SUBCUTANEOUS
Status: DISCONTINUED | OUTPATIENT
Start: 2023-11-16 | End: 2023-11-17 | Stop reason: HOSPADM

## 2023-11-16 RX ORDER — ONDANSETRON 2 MG/ML
4 INJECTION INTRAMUSCULAR; INTRAVENOUS EVERY 6 HOURS PRN
Status: DISCONTINUED | OUTPATIENT
Start: 2023-11-16 | End: 2023-11-17 | Stop reason: HOSPADM

## 2023-11-16 RX ORDER — NALOXONE HYDROCHLORIDE 0.4 MG/ML
0.2 INJECTION, SOLUTION INTRAMUSCULAR; INTRAVENOUS; SUBCUTANEOUS
Status: DISCONTINUED | OUTPATIENT
Start: 2023-11-16 | End: 2023-11-17 | Stop reason: HOSPADM

## 2023-11-16 RX ORDER — NAPROXEN SODIUM 220 MG
220 TABLET ORAL DAILY
COMMUNITY

## 2023-11-16 RX ORDER — HYDROMORPHONE HCL IN WATER/PF 6 MG/30 ML
0.4 PATIENT CONTROLLED ANALGESIA SYRINGE INTRAVENOUS
Status: DISCONTINUED | OUTPATIENT
Start: 2023-11-16 | End: 2023-11-17 | Stop reason: HOSPADM

## 2023-11-16 RX ORDER — HYDROMORPHONE HCL IN WATER/PF 6 MG/30 ML
0.2 PATIENT CONTROLLED ANALGESIA SYRINGE INTRAVENOUS
Status: DISCONTINUED | OUTPATIENT
Start: 2023-11-16 | End: 2023-11-17 | Stop reason: HOSPADM

## 2023-11-16 RX ORDER — LIDOCAINE 40 MG/G
CREAM TOPICAL
Status: DISCONTINUED | OUTPATIENT
Start: 2023-11-16 | End: 2023-11-17 | Stop reason: HOSPADM

## 2023-11-16 RX ORDER — SODIUM CHLORIDE, SODIUM LACTATE, POTASSIUM CHLORIDE, CALCIUM CHLORIDE 600; 310; 30; 20 MG/100ML; MG/100ML; MG/100ML; MG/100ML
INJECTION, SOLUTION INTRAVENOUS CONTINUOUS
Status: DISCONTINUED | OUTPATIENT
Start: 2023-11-16 | End: 2023-11-17 | Stop reason: HOSPADM

## 2023-11-16 RX ORDER — AMOXICILLIN 250 MG
1 CAPSULE ORAL 2 TIMES DAILY
Status: DISCONTINUED | OUTPATIENT
Start: 2023-11-16 | End: 2023-11-17 | Stop reason: HOSPADM

## 2023-11-16 RX ORDER — PROCHLORPERAZINE MALEATE 5 MG
5 TABLET ORAL EVERY 6 HOURS PRN
Status: DISCONTINUED | OUTPATIENT
Start: 2023-11-16 | End: 2023-11-17 | Stop reason: HOSPADM

## 2023-11-16 RX ORDER — BISACODYL 10 MG
10 SUPPOSITORY, RECTAL RECTAL DAILY PRN
Status: DISCONTINUED | OUTPATIENT
Start: 2023-11-16 | End: 2023-11-17 | Stop reason: HOSPADM

## 2023-11-16 RX ORDER — ONDANSETRON 2 MG/ML
4 INJECTION INTRAMUSCULAR; INTRAVENOUS EVERY 30 MIN PRN
Status: DISCONTINUED | OUTPATIENT
Start: 2023-11-16 | End: 2023-11-16

## 2023-11-16 RX ORDER — ACETAMINOPHEN 325 MG/1
975 TABLET ORAL ONCE
Status: COMPLETED | OUTPATIENT
Start: 2023-11-16 | End: 2023-11-16

## 2023-11-16 RX ORDER — KETAMINE HYDROCHLORIDE 10 MG/ML
INJECTION INTRAMUSCULAR; INTRAVENOUS PRN
Status: DISCONTINUED | OUTPATIENT
Start: 2023-11-16 | End: 2023-11-16

## 2023-11-16 RX ORDER — OXYCODONE HYDROCHLORIDE 5 MG/1
5 TABLET ORAL
Status: DISCONTINUED | OUTPATIENT
Start: 2023-11-16 | End: 2023-11-16

## 2023-11-16 RX ORDER — LABETALOL HYDROCHLORIDE 5 MG/ML
5 INJECTION, SOLUTION INTRAVENOUS 4 TIMES DAILY PRN
Status: DISCONTINUED | OUTPATIENT
Start: 2023-11-16 | End: 2023-11-17 | Stop reason: HOSPADM

## 2023-11-16 RX ORDER — OXYCODONE HYDROCHLORIDE 5 MG/1
10 TABLET ORAL EVERY 4 HOURS PRN
Status: DISCONTINUED | OUTPATIENT
Start: 2023-11-16 | End: 2023-11-17 | Stop reason: HOSPADM

## 2023-11-16 RX ORDER — LIDOCAINE 40 MG/G
CREAM TOPICAL
Status: DISCONTINUED | OUTPATIENT
Start: 2023-11-16 | End: 2023-11-16 | Stop reason: HOSPADM

## 2023-11-16 RX ORDER — GLYCOPYRROLATE 0.2 MG/ML
INJECTION, SOLUTION INTRAMUSCULAR; INTRAVENOUS PRN
Status: DISCONTINUED | OUTPATIENT
Start: 2023-11-16 | End: 2023-11-16

## 2023-11-16 RX ORDER — OXYCODONE HYDROCHLORIDE 5 MG/1
10 TABLET ORAL
Status: DISCONTINUED | OUTPATIENT
Start: 2023-11-16 | End: 2023-11-16

## 2023-11-16 RX ORDER — LIDOCAINE HYDROCHLORIDE 10 MG/ML
INJECTION, SOLUTION INFILTRATION; PERINEURAL PRN
Status: DISCONTINUED | OUTPATIENT
Start: 2023-11-16 | End: 2023-11-16

## 2023-11-16 RX ORDER — OXYCODONE HYDROCHLORIDE 5 MG/1
5 TABLET ORAL EVERY 4 HOURS PRN
Status: DISCONTINUED | OUTPATIENT
Start: 2023-11-16 | End: 2023-11-17 | Stop reason: HOSPADM

## 2023-11-16 RX ORDER — FENTANYL CITRATE 50 UG/ML
25-100 INJECTION, SOLUTION INTRAMUSCULAR; INTRAVENOUS
Status: DISCONTINUED | OUTPATIENT
Start: 2023-11-16 | End: 2023-11-16 | Stop reason: HOSPADM

## 2023-11-16 RX ORDER — NICOTINE POLACRILEX 4 MG
15-30 LOZENGE BUCCAL
Status: DISCONTINUED | OUTPATIENT
Start: 2023-11-16 | End: 2023-11-17 | Stop reason: HOSPADM

## 2023-11-16 RX ORDER — CEFAZOLIN SODIUM/WATER 2 G/20 ML
2 SYRINGE (ML) INTRAVENOUS
Status: COMPLETED | OUTPATIENT
Start: 2023-11-16 | End: 2023-11-16

## 2023-11-16 RX ORDER — DEXAMETHASONE SODIUM PHOSPHATE 10 MG/ML
INJECTION, SOLUTION INTRAMUSCULAR; INTRAVENOUS PRN
Status: DISCONTINUED | OUTPATIENT
Start: 2023-11-16 | End: 2023-11-16

## 2023-11-16 RX ORDER — BUPROPION HYDROCHLORIDE 150 MG/1
150 TABLET ORAL DAILY
Status: DISCONTINUED | OUTPATIENT
Start: 2023-11-17 | End: 2023-11-17 | Stop reason: HOSPADM

## 2023-11-16 RX ORDER — ALLOPURINOL 100 MG/1
100 TABLET ORAL DAILY
Status: DISCONTINUED | OUTPATIENT
Start: 2023-11-16 | End: 2023-11-17 | Stop reason: HOSPADM

## 2023-11-16 RX ORDER — POLYETHYLENE GLYCOL 3350 17 G/17G
17 POWDER, FOR SOLUTION ORAL DAILY
Status: DISCONTINUED | OUTPATIENT
Start: 2023-11-17 | End: 2023-11-17 | Stop reason: HOSPADM

## 2023-11-16 RX ORDER — CEFAZOLIN SODIUM 2 G/100ML
2 INJECTION, SOLUTION INTRAVENOUS EVERY 8 HOURS
Qty: 200 ML | Refills: 0 | Status: COMPLETED | OUTPATIENT
Start: 2023-11-16 | End: 2023-11-16

## 2023-11-16 RX ORDER — DEXTROSE MONOHYDRATE 25 G/50ML
25-50 INJECTION, SOLUTION INTRAVENOUS
Status: DISCONTINUED | OUTPATIENT
Start: 2023-11-16 | End: 2023-11-17 | Stop reason: HOSPADM

## 2023-11-16 RX ORDER — TRANEXAMIC ACID 650 MG/1
1950 TABLET ORAL ONCE
Status: COMPLETED | OUTPATIENT
Start: 2023-11-16 | End: 2023-11-16

## 2023-11-16 RX ORDER — MAGNESIUM SULFATE 4 G/50ML
4 INJECTION INTRAVENOUS ONCE
Status: COMPLETED | OUTPATIENT
Start: 2023-11-16 | End: 2023-11-16

## 2023-11-16 RX ORDER — LOSARTAN POTASSIUM 50 MG/1
50 TABLET ORAL DAILY
Status: DISCONTINUED | OUTPATIENT
Start: 2023-11-17 | End: 2023-11-17 | Stop reason: HOSPADM

## 2023-11-16 RX ORDER — AMOXICILLIN 250 MG
1-2 CAPSULE ORAL 2 TIMES DAILY
Qty: 60 TABLET | Refills: 1 | Status: ON HOLD | OUTPATIENT
Start: 2023-11-16 | End: 2024-03-11

## 2023-11-16 RX ORDER — FENOFIBRATE 48 MG/1
48 TABLET, COATED ORAL DAILY
Status: DISCONTINUED | OUTPATIENT
Start: 2023-11-16 | End: 2023-11-17 | Stop reason: HOSPADM

## 2023-11-16 RX ORDER — ATORVASTATIN CALCIUM 10 MG/1
20 TABLET, FILM COATED ORAL EVERY EVENING
Status: DISCONTINUED | OUTPATIENT
Start: 2023-11-16 | End: 2023-11-17 | Stop reason: HOSPADM

## 2023-11-16 RX ADMIN — GLYCOPYRROLATE 0.1 MG: 0.2 INJECTION INTRAMUSCULAR; INTRAVENOUS at 07:55

## 2023-11-16 RX ADMIN — PROPOFOL 180 MG: 10 INJECTION, EMULSION INTRAVENOUS at 07:26

## 2023-11-16 RX ADMIN — ONDANSETRON 4 MG: 2 INJECTION INTRAMUSCULAR; INTRAVENOUS at 09:29

## 2023-11-16 RX ADMIN — SODIUM CHLORIDE, POTASSIUM CHLORIDE, SODIUM LACTATE AND CALCIUM CHLORIDE: 600; 310; 30; 20 INJECTION, SOLUTION INTRAVENOUS at 12:28

## 2023-11-16 RX ADMIN — INSULIN ASPART 2 UNITS: 100 INJECTION, SOLUTION INTRAVENOUS; SUBCUTANEOUS at 17:13

## 2023-11-16 RX ADMIN — GLYCOPYRROLATE 0.1 MG: 0.2 INJECTION INTRAMUSCULAR; INTRAVENOUS at 07:42

## 2023-11-16 RX ADMIN — SENNOSIDES AND DOCUSATE SODIUM 1 TABLET: 8.6; 5 TABLET ORAL at 13:18

## 2023-11-16 RX ADMIN — ATORVASTATIN CALCIUM 20 MG: 10 TABLET, FILM COATED ORAL at 19:34

## 2023-11-16 RX ADMIN — FENTANYL CITRATE 50 MCG: 0.05 INJECTION, SOLUTION INTRAMUSCULAR; INTRAVENOUS at 07:26

## 2023-11-16 RX ADMIN — HYDROXYZINE HYDROCHLORIDE 10 MG: 10 TABLET ORAL at 19:34

## 2023-11-16 RX ADMIN — LABETALOL HYDROCHLORIDE 5 MG: 5 INJECTION, SOLUTION INTRAVENOUS at 10:25

## 2023-11-16 RX ADMIN — Medication 2 G: at 07:22

## 2023-11-16 RX ADMIN — SODIUM CHLORIDE, POTASSIUM CHLORIDE, SODIUM LACTATE AND CALCIUM CHLORIDE: 600; 310; 30; 20 INJECTION, SOLUTION INTRAVENOUS at 06:49

## 2023-11-16 RX ADMIN — MIDAZOLAM HYDROCHLORIDE 2 MG: 1 INJECTION, SOLUTION INTRAMUSCULAR; INTRAVENOUS at 06:59

## 2023-11-16 RX ADMIN — ROCURONIUM BROMIDE 10 MG: 10 INJECTION, SOLUTION INTRAVENOUS at 08:19

## 2023-11-16 RX ADMIN — LIDOCAINE HYDROCHLORIDE 20 MG: 10 INJECTION, SOLUTION INFILTRATION; PERINEURAL at 07:26

## 2023-11-16 RX ADMIN — DEXAMETHASONE SODIUM PHOSPHATE 5 MG: 10 INJECTION, SOLUTION INTRAMUSCULAR; INTRAVENOUS at 07:26

## 2023-11-16 RX ADMIN — ASPIRIN 81 MG: 81 TABLET, COATED ORAL at 13:18

## 2023-11-16 RX ADMIN — TRANEXAMIC ACID 1950 MG: 650 TABLET ORAL at 06:01

## 2023-11-16 RX ADMIN — ROCURONIUM BROMIDE 50 MG: 10 INJECTION, SOLUTION INTRAVENOUS at 07:26

## 2023-11-16 RX ADMIN — FENTANYL CITRATE 50 MCG: 50 INJECTION, SOLUTION INTRAMUSCULAR; INTRAVENOUS at 06:59

## 2023-11-16 RX ADMIN — INSULIN ASPART 2 UNITS: 100 INJECTION, SOLUTION INTRAVENOUS; SUBCUTANEOUS at 13:19

## 2023-11-16 RX ADMIN — ALLOPURINOL 100 MG: 100 TABLET ORAL at 13:18

## 2023-11-16 RX ADMIN — CEFAZOLIN SODIUM 2 G: 2 INJECTION, SOLUTION INTRAVENOUS at 22:30

## 2023-11-16 RX ADMIN — SUGAMMADEX 200 MG: 100 INJECTION, SOLUTION INTRAVENOUS at 09:24

## 2023-11-16 RX ADMIN — ASPIRIN 81 MG: 81 TABLET, COATED ORAL at 19:34

## 2023-11-16 RX ADMIN — FENTANYL CITRATE 50 MCG: 0.05 INJECTION, SOLUTION INTRAMUSCULAR; INTRAVENOUS at 07:51

## 2023-11-16 RX ADMIN — SENNOSIDES AND DOCUSATE SODIUM 1 TABLET: 8.6; 5 TABLET ORAL at 19:34

## 2023-11-16 RX ADMIN — SODIUM CHLORIDE, POTASSIUM CHLORIDE, SODIUM LACTATE AND CALCIUM CHLORIDE: 600; 310; 30; 20 INJECTION, SOLUTION INTRAVENOUS at 09:13

## 2023-11-16 RX ADMIN — OXYCODONE HYDROCHLORIDE 5 MG: 5 TABLET ORAL at 18:53

## 2023-11-16 RX ADMIN — LABETALOL HYDROCHLORIDE 5 MG: 5 INJECTION, SOLUTION INTRAVENOUS at 11:22

## 2023-11-16 RX ADMIN — KETAMINE HYDROCHLORIDE 30 MG: 10 INJECTION INTRAMUSCULAR; INTRAVENOUS at 07:55

## 2023-11-16 RX ADMIN — MAGNESIUM SULFATE HEPTAHYDRATE 4 G: 4 INJECTION, SOLUTION INTRAVENOUS at 06:35

## 2023-11-16 RX ADMIN — CEFAZOLIN SODIUM 2 G: 2 INJECTION, SOLUTION INTRAVENOUS at 15:27

## 2023-11-16 RX ADMIN — LABETALOL HYDROCHLORIDE 5 MG: 5 INJECTION, SOLUTION INTRAVENOUS at 10:40

## 2023-11-16 RX ADMIN — ACETAMINOPHEN 975 MG: 325 TABLET ORAL at 06:01

## 2023-11-16 RX ADMIN — FENOFIBRATE 48 MG: 48 TABLET, COATED ORAL at 13:18

## 2023-11-16 ASSESSMENT — ACTIVITIES OF DAILY LIVING (ADL)
ADLS_ACUITY_SCORE: 21
ADLS_ACUITY_SCORE: 35
ADLS_ACUITY_SCORE: 21
ADLS_ACUITY_SCORE: 35
ADLS_ACUITY_SCORE: 35
ADLS_ACUITY_SCORE: 21
ADLS_ACUITY_SCORE: 21

## 2023-11-16 ASSESSMENT — COPD QUESTIONNAIRES: COPD: 1

## 2023-11-16 NOTE — ANESTHESIA POSTPROCEDURE EVALUATION
Patient: Christopher Lindsey    Procedure: Procedure(s):  RIGHT TOTAL KNEE ARTHROPLASTY       Anesthesia Type:  No value filed.    Note:  Disposition: Outpatient   Postop Pain Control: Uneventful            Sign Out: Well controlled pain   PONV: No   Neuro/Psych: Uneventful            Sign Out: Acceptable/Baseline neuro status   Airway/Respiratory: Uneventful            Sign Out: Acceptable/Baseline resp. status   CV/Hemodynamics: Uneventful            Sign Out: Acceptable CV status; No obvious hypovolemia; No obvious fluid overload   Other NRE: NONE   DID A NON-ROUTINE EVENT OCCUR? No    Event details/Postop Comments:  Patient recovering comfortably.        Last vitals:  Vitals Value Taken Time   /83 11/16/23 1150   Temp 37.5  C (99.5  F) 11/16/23 1159   Pulse 77 11/16/23 1157   Resp 31 11/16/23 1155   SpO2 91 % 11/16/23 1157   Vitals shown include unfiled device data.    Electronically Signed By: Lauro Small DO  November 16, 2023  1:31 PM

## 2023-11-16 NOTE — PROVIDER NOTIFICATION
Patient oxygen saturations 88-91% on room air. Patient denies shortness of breath or chest pain. Patient reports history of COPD and sleep apnea. He does not tolerate his Bipap at home. Lung sounds diminished and shallow breathing and encouraged deep breaths. Patient reports 5-6 glasses of wine per day and had 3 glasses last night. Notified Dr Staci sylvester to proceed and will continue to monitor.

## 2023-11-16 NOTE — PLAN OF CARE
Note from admission-1930. Pt rated pain 2-5/10 during shift thus far. No new skin issues noted. ACE is CDI. Full sensation per pt. Assist of 1 with walker for transferring. Fluids running, IV patent. Voiding adequately. Education on medication administration and use of call-light to reduce risk for falls and injury. Alarms in place. No further issues noted. VSS, denies shortness of breath but unable to wean off 3L of O2 at this time.    Patient vital signs are at baseline: No,  Reason:  On 3L of O2.  Patient able to ambulate as they were prior to admission or with assist devices provided by therapies during their stay:  Yes  Patient MUST void prior to discharge:  Yes  Patient able to tolerate oral intake:  Yes  Pain has adequate pain control using Oral analgesics:  Yes  Does patient have an identified :  Yes  Has goal D/C date and time been discussed with patient:  Yes    Taylor R Schoenecker, RN

## 2023-11-16 NOTE — PROVIDER NOTIFICATION
MDA notified patient's -190. Per MDA, RN to continue to monitor pt. MDA will put in orders for medication and parameters. MDA at bedside.

## 2023-11-16 NOTE — PHARMACY-ADMISSION MEDICATION HISTORY
Pharmacist JOSE DAVID Medication History    Admission medication history is complete. The information provided in this note is only as accurate as the sources available at the time of the update.    Medication reconciliation/reorder completed by provider prior to medication history? No    Information Source(s): Patient and Clinic records and Care Everywhere via in-person    Pertinent Information: N/A    Medication Affordability:  Not including over the counter (OTC) medications, was there a time in the past 3 months when you did not take your medications as prescribed because of cost?: No    Allergies reviewed with patient and updates made in EHR: yes    Medications available for use during hospital stay: None.      Medication History Completed By: Sanket Ashford [facility] 11/16/2023 6:37 AM    PTA Med List   Medication Sig Note Last Dose    allopurinol (ZYLOPRIM) 100 MG tablet TAKE ONE TABLET BY MOUTH EVERY DAY  11/15/2023 at AM    atorvastatin (LIPITOR) 20 MG tablet TAKE ONE TABLET BY MOUTH EVERY DAY AT BEDTIME  11/15/2023 at AM    buPROPion (WELLBUTRIN XL) 150 MG 24 hr tablet TAKE ONE TABLET BY MOUTH EVERY DAY  11/16/2023 at AM    cefadroxil (DURICEF) 500 MG capsule take one capsule by mouth twice a day for 7 days 11/16/2023: For post-op. post-op    diltiazem ER COATED BEADS (CARDIZEM CD/CARTIA XT) 180 MG 24 hr capsule TAKE ONE CAPSULE BY MOUTH EVERY DAY  11/14/2023 at AM    fenofibrate (TRICOR) 48 MG tablet Take 1 tablet (48 mg) by mouth daily  11/15/2023 at AM    FLUoxetine (PROZAC) 20 MG capsule TAKE ONE CAPSULE BY MOUTH EVERY DAY  11/16/2023 at AM    hydrOXYzine (ATARAX) 10 MG tablet Take 10 mg by mouth 4 times daily as needed 11/16/2023: For post-op post-op    losartan (COZAAR) 50 MG tablet TAKE ONE TABLET BY MOUTH EVERY DAY  11/16/2023 at AM    metFORMIN (GLUCOPHAGE) 500 MG tablet Take 1 tablet (500 mg) by mouth 2 times daily (with meals)  11/15/2023 at PM    naproxen sodium (ANAPROX) 220 MG tablet Take 220 mg by  mouth daily  11/9/2023 at AM    OLANZapine (ZYPREXA) 5 MG tablet TAKE TWO TABLETS BY MOUTH EVERY DAY  11/16/2023 at AM    ondansetron (ZOFRAN ODT) 4 MG ODT tab Take 4 mg by mouth every 8 hours as needed for nausea 11/16/2023: For post-op Unknown    oxyCODONE (ROXICODONE) 5 MG tablet Take 5-10 mg by mouth every 4 hours as needed for pain 11/16/2023: For post-op post-op

## 2023-11-16 NOTE — CONSULTS
[facility] MEDICINE CONSULT NOTE   Physician requesting consult: Ajay Pendleton DO    Reason for consult: Postoperative medical management of medical co-morbidities as below    Assessment and Plan    Christopher Lindsey is a 73 year old  male with a history of hypertension, DM2, dyslipidemia, moderate obesity, depression, underwent right total knee arthroplasty.  EBL 25 mL.  Right knee pain is stable.  Hemodynamically stable postoperatively.      Procedure(s):  RIGHT TOTAL KNEE ARTHROPLASTY  Post-operative Day: Day of Surgery    Estimated Blood Loss:  25 mL    Essential hypertension  Losartan 50 mg daily  Diltiazem 180 mg daily  Hold antihypertensive if systolic blood pressure is less than 110 as risk of postop hypotension    History of SVT  Heart rate is stable  Resume diltiazem    Dyslipidemia  Lipitor 20 mg daily  Tricor 48 mg daily    DM2  Metformin 500 mg twice a day, on hold  Diabetic diet and insulin sliding scale  Globin A1c 6.8    Depression and anxiety  Has stable mood and affect  Fluoxetine 20 mg daily  Olanzapine 10 mg daily  Wellbutrin  mg daily    Moderate obesity Body mass index is 37.95 kg/m .  Modification of lifestyle for weight loss when able    Obstructive sleep apnea  Not using CPAP    History of alcohol use  Close monitoring for alcohol withdrawal    Status post right total knee arthroplasty  Resume routine postoperative care  Physical and Occupational Therapy  Use incentive spirometry frequently  DVT prophylaxis per orthopedics, aspirin 81 mg twice a day  Pain control with Tylenol 975 mg every 8 hours, 650 mg every 4 hours as needed, oxycodone 5 to 10 mg every 4 hours as needed, IV Dilaudid 0.2 to 0.4 mg every 2 hours as needed    -Reviewed the patient's preoperative H and P and updated missing elements.  -Home medication reconciliation has been reviewed.  Medications have been ordered as noted from the home list and changes are documented above     HISTORY      Christopher Lindsey is a 73 year old male history of hypertension, DM2, dyslipidemia, moderate obesity, depression, underwent right total knee arthroplasty.  EBL 25 mL.  Right knee pain is stable.  Hemodynamically stable postoperatively.  On losartan 50 mg daily, diltiazem 180 mg daily for hypertension.  Blood pressure is stable.  Taking Lipitor 20 mg daily, Tricor 48 mg daily for dyslipidemia.  On metformin 500 mg twice a day for DM2.  Hemoglobin A1c 6.8.  On fluoxetine 20 mg daily, Wellbutrin  mg daily, olanzapine 10 mg daily for depression and anxiety.  Has stable mood and affect.  History of alcohol dependence.  No history of alcohol withdrawal.  Has obstructive sleep apnea.  Not using CPAP.  Denies headache, chest pain, breathing difficulty, palpitation, nausea, vomiting, abdominal pain or urinary symptoms.  Does not have history of heart disease, lung disease, bleeding or clotting disorder.  Physical is reviewed.  History is provided by the patient.    Past Medical History     Patient Active Problem List    Diagnosis Date Noted    Primary osteoarthritis of knee 11/16/2023     Priority: Medium    Alcohol dependence (H) 09/30/2022     Priority: Medium    Type 2 diabetes mellitus without complication, without long-term current use of insulin (H) 07/07/2020     Priority: Medium    Hx of gout 01/10/2019     Priority: Medium     Uric acid level 5.5 in 2017        Obesity hypoventilation syndrome (H) 01/08/2019     Priority: Medium    Restrictive lung disease 01/08/2019     Priority: Medium    Hearing problem of both ears 01/08/2019     Priority: Medium     Saw audiology 2017 - rec f/u 3 yrs        Multiple thyroid nodules 01/08/2019     Priority: Medium     Noted on thyroid US 2014 - 0.6 cm right nodule, 0.4 cm left nodule - rec   f/u 6-12 months        Abnormal liver ultrasound 01/08/2019     Priority: Medium     Changes c/w hepatocellular disease vs fatty liver        Hx of colonic polyps 01/08/2019      Priority: Medium     Colonoscopy 2016 - 2 hyperplastic polyps - likely f/u 5 yrs        Elevated triglycerides with high cholesterol 12/04/2018     Priority: Medium    Benign essential hypertension 12/04/2018     Priority: Medium     Coronary calcium score 133 in 2017        Other emphysema (H) 12/04/2018     Priority: Medium     Hx following [facility] pul[facility]ology, [facility]        Recurrent major depressive disorder, in full remission (H24) 12/04/2018     Priority: Medium    SVT (supraventricular tachycardia) 12/04/2018     Priority: Medium     Hx 2 episodes in past year, now on diltiazem for this        TERESA (obstructive sleep apnea) 12/04/2018     Priority: Medium     Has BIPAP machine        Bilateral primary osteoarthritis of knee 12/04/2018     Priority: Medium    Obesity (BMI 35.0-39.9) with comorbidity (H) 12/04/2018     Priority: Medium        Surgical History   He  has a past surgical history that includes Hemorrhoidectomy external (2004) and Incision And Drainage Perirectal Abscess.     Past Surgical History:   Procedure Laterality Date    HEMORRHOIDECTOMY EXTERNAL  2004    INCISION AND DRAINAGE PERIRECTAL ABSCESS         Family History    Reviewed, and family history includes Cancer (age of onset: 88.00) in his father; Cerebrovascular Disease in his maternal grandfather; Coronary Artery Disease in his paternal grandfather; Depression in his mother and sister; Diabetes in his sister; Heart Failure in his maternal grandmother, paternal grandfather, and paternal grandmother; No Known Problems in his brother and brother; Pancreatic Cancer (age of onset: 62.00) in his mother.    Social History    Reviewed, and he  reports that he quit smoking about 9 years ago. His smoking use included cigarettes. He has a 40.00 pack-year smoking history. He has never used smokeless tobacco. He reports current alcohol use of about 56.0 standard drinks of alcohol per week. He reports that he does not use drugs.  Social History      Tobacco Use    Smoking status: Former     Packs/day: 1.00     Years: 40.00     Additional pack years: 0.00     Total pack years: 40.00     Types: Cigarettes     Quit date: 2014     Years since quittin.8    Smokeless tobacco: Never    Tobacco comments:     still using e-cigs at lowest level, trying to quit   Substance Use Topics    Alcohol use: Yes     Alcohol/week: 56.0 standard drinks of alcohol     Types: 56 Glasses of wine per week     Comment: 21-28 per week       Allergies   No Known Allergies    Prior to Admission Medications      Medications Prior to Admission   Medication Sig Dispense Refill Last Dose    allopurinol (ZYLOPRIM) 100 MG tablet TAKE ONE TABLET BY MOUTH EVERY DAY 90 tablet 1 11/15/2023 at AM    atorvastatin (LIPITOR) 20 MG tablet TAKE ONE TABLET BY MOUTH EVERY DAY AT BEDTIME 90 tablet 3 11/15/2023 at AM    buPROPion (WELLBUTRIN XL) 150 MG 24 hr tablet TAKE ONE TABLET BY MOUTH EVERY DAY 90 tablet 1 2023 at AM    cefadroxil (DURICEF) 500 MG capsule take one capsule by mouth twice a day for 7 days   post-op    diltiazem ER COATED BEADS (CARDIZEM CD/CARTIA XT) 180 MG 24 hr capsule TAKE ONE CAPSULE BY MOUTH EVERY DAY 90 capsule 3 2023 at AM    fenofibrate (TRICOR) 48 MG tablet Take 1 tablet (48 mg) by mouth daily 90 tablet 3 11/15/2023 at AM    FLUoxetine (PROZAC) 20 MG capsule TAKE ONE CAPSULE BY MOUTH EVERY DAY 90 capsule 0 2023 at AM    hydrOXYzine (ATARAX) 10 MG tablet Take 10 mg by mouth 4 times daily as needed   post-op    losartan (COZAAR) 50 MG tablet TAKE ONE TABLET BY MOUTH EVERY DAY 90 tablet 2 2023 at AM    metFORMIN (GLUCOPHAGE) 500 MG tablet Take 1 tablet (500 mg) by mouth 2 times daily (with meals) 180 tablet 1 11/15/2023 at PM    naproxen sodium (ANAPROX) 220 MG tablet Take 220 mg by mouth daily   2023 at AM    OLANZapine (ZYPREXA) 5 MG tablet TAKE TWO TABLETS BY MOUTH EVERY  tablet 1 2023 at AM    ondansetron (ZOFRAN ODT) 4 MG ODT  tab Take 4 mg by mouth every 8 hours as needed for nausea   Unknown    oxyCODONE (ROXICODONE) 5 MG tablet Take 5-10 mg by mouth every 4 hours as needed for pain   post-op    alcohol swab prep pads Use to swab area of injection/malia as directed. 100 each 6     blood glucose (NO BRAND SPECIFIED) lancets standard Use to test blood sugar 1 times daily or as directed. 100 each 1     blood glucose (NO BRAND SPECIFIED) test strip Use to test blood sugar 1 times daily or as directed. OneTouch Verio test strips 100 strip 4     blood glucose (NO BRAND SPECIFIED) test strip Use to test blood sugar 1 times daily or as directed. 100 strip 1     blood glucose (ONE TOUCH DELICA) lancing device Lancing device to be used with lancets. 1 each 0     blood glucose monitoring (NO BRAND SPECIFIED) meter device kit Use to test blood sugar 1 times daily or as directed. 1 kit 1     blood glucose monitoring (ONETOUCH VERIO) meter device kit Use to test blood sugar 1 times daily or as directed. 1 kit 0     Lancets (ONETOUCH DELICA PLUS [id number]) MISC Inject 1 Lancet Subcutaneous daily          Review of Systems   A 12 point comprehensive review of systems was negative except as noted above.    OBJECTIVE         Physical Exam   Temp:  [98.3  F (36.8  C)-99.1  F (37.3  C)] 99.1  F (37.3  C)  Pulse:  [73-98] 73  Resp:  [18-29] 25  BP: (119-193)/(60-87) 172/80  SpO2:  [90 %-98 %] 95 %  Body mass index is 37.95 kg/m .    GENERAL:  Alert, appears comfortable, in no acute distress, appears stated age, obese   HEAD:  Normocephalic, without obvious abnormality, atraumatic   EYES:  PERRL, conjunctiva clear,  EOM's intact   NOSE: Nares normal,  mucosa normal, no drainage   THROAT: Lips, mucosa,  gums normal, mouth moist   NECK: Supple, symmetrical, trachea midline   BACK:   Symmetric, no curvature, ROM normal   LUNGS:   Clear to auscultation bilaterally, no rales, rhonchi, or wheezing, symmetric chest rise on inhalation, respirations unlabored   CHEST  WALL:  No tenderness or deformity   HEART:  Regular rate and rhythm, S1 and S2 normal, no murmur    ABDOMEN:   Soft, non-tender, bowel sounds active   no masses, no organomegaly, no rebound or guarding   EXTREMITIES: Status post right total knee arthroplasty   SKIN: No rashes in the visualized areas   NEURO: Alert, oriented x 3   PSYCH: Cooperative, behavior is appropriate        Imaging Reviewed Personally By Myself    Radiology Results:   Right knee arthroplasty  Completed right total knee arthroplasty. Normal joint alignment. No evidence of periprosthetic fracture or other immediate complication. Postoperative air in the joint space and surrounding soft tissues.    Labs Reviewed Personally By Myself   WBC 7.1, hemoglobin 14.8, platelet 161  Sodium 142, potassium 4.4, creatinine 0.68, glucose 160    Preoperative Labs Reviewed Personally By Myself     Thank you for this consultation.  Appreciate the opportunity to participate in the care of Christopher Lindsey, please feel free to contact us for any questions or concerns.    Jennifer Redd MD  [facility]  Phone: #[phone]

## 2023-11-16 NOTE — ANESTHESIA PREPROCEDURE EVALUATION
Anesthesia Pre-Procedure Evaluation    Patient: Christopher Lindsey   MRN: [id number] : 1950        Procedure : Procedure(s):  RIGHT TOTAL KNEE ARTHROPLASTY          Past Medical History:   Diagnosis Date     Arrhythmia      Arthritis      Benign essential hypertension 2018     Diabetes (H)      Elevated triglycerides with high cholesterol 2018     TERESA (obstructive sleep apnea) 2018    Has BIPAP machine     Other emphysema (H) 2018    Hx following with pulmonology, [facility]     Patellofemoral disorder of both knees 2018     Prediabetes 2018     Recurrent major depressive disorder, in full remission (H24) 2018     Spinal headache      SVT (supraventricular tachycardia) 2018    Hx 2 episodes in past year, now on diltiazem for this      Past Surgical History:   Procedure Laterality Date     HEMORRHOIDECTOMY EXTERNAL  2004     INCISION AND DRAINAGE PERIRECTAL ABSCESS        No Known Allergies   Social History     Tobacco Use     Smoking status: Former     Packs/day: 1.00     Years: 40.00     Additional pack years: 0.00     Total pack years: 40.00     Types: Cigarettes     Quit date: 2014     Years since quittin.8     Smokeless tobacco: Never     Tobacco comments:     still using e-cigs at lowest level, trying to quit   Substance Use Topics     Alcohol use: Yes     Alcohol/week: 56.0 standard drinks of alcohol     Types: 56 Glasses of wine per week     Comment: 21-28 per week      Wt Readings from Last 1 Encounters:   23 116.6 kg (257 lb)        Anesthesia Evaluation            ROS/MED HX  ENT/Pulmonary: Comment: SPO2 88-90 on room air     (+) sleep apnea,    TERESA risk factors,                    COPD,              Neurologic:       Cardiovascular:     (+)  hypertension- -   -  - -                                      METS/Exercise Tolerance:     Hematologic:       Musculoskeletal:       GI/Hepatic:       Renal/Genitourinary:       Endo:     (+)  type II  "DM,        thyroid problem, hypothyroidism,    Obesity,       Psychiatric/Substance Use:     (+)   alcohol abuse      Infectious Disease:       Malignancy:       Other:            Physical Exam    Airway        Mallampati: III   TM distance: > 3 FB   Neck ROM: full   Mouth opening: > 3 cm    Respiratory Devices and Support         Dental           Cardiovascular             Pulmonary               OUTSIDE LABS:  CBC:   Lab Results   Component Value Date    WBC 7.1 11/03/2023    WBC 7.6 10/02/2023    HGB 14.8 11/03/2023    HGB 13.8 10/02/2023    HCT 46.6 11/03/2023    HCT 43.0 10/02/2023     11/03/2023     10/02/2023     BMP:   Lab Results   Component Value Date     11/03/2023     10/06/2023    POTASSIUM 4.4 11/03/2023    POTASSIUM 4.1 10/06/2023    CHLORIDE 105 11/03/2023    CHLORIDE 102 10/06/2023    CO2 27 11/03/2023    CO2 28 10/06/2023    BUN 9.9 11/03/2023    BUN 8.5 10/06/2023    CR 0.68 11/03/2023    CR 0.68 10/06/2023     (H) 11/03/2023     (H) 10/06/2023     COAGS: No results found for: \"PTT\", \"INR\", \"FIBR\"  POC: No results found for: \"BGM\", \"HCG\", \"HCGS\"  HEPATIC:   Lab Results   Component Value Date    ALBUMIN 4.2 10/06/2023    PROTTOTAL 7.4 10/06/2023    ALT 34 10/06/2023    AST 50 (H) 10/06/2023    ALKPHOS 104 10/06/2023    BILITOTAL 0.4 10/06/2023     OTHER:   Lab Results   Component Value Date    A1C 6.8 (H) 09/11/2023    YOLA 9.1 11/03/2023    MAG 2.2 03/02/2023    TSH 1.68 09/24/2021       Anesthesia Plan    ASA Status:  3       Anesthesia Type: General.     - Airway: ETT              Consents    Anesthesia Plan(s) and associated risks, benefits, and realistic alternatives discussed. Questions answered and patient/representative(s) expressed understanding.     - Discussed: Risks, Benefits and Alternatives for BOTH SEDATION and the PROCEDURE were discussed     - Discussed with:  Patient      - Extended Intubation/Ventilatory Support Discussed: No.      - Patient " is DNR/DNI Status: No     Use of blood products discussed: No .     Postoperative Care    Pain management: IV analgesics, Oral pain medications.   PONV prophylaxis: Ondansetron (or other 5HT-3), Dexamethasone or Solumedrol     Comments:              Lauro Small DO

## 2023-11-16 NOTE — ANESTHESIA PROCEDURE NOTES
Airway       Patient location during procedure: OR       Procedure Start/Stop Times: 11/16/2023 7:31 AM  Staff -        Anesthesiologist:  Lauro Small DO       CRNA: Rodolfo Finn APRN CRNA       Performed By: CRNA  Consent for Airway        Urgency: elective  Indications and Patient Condition       Indications for airway management: yasmeen-procedural       Induction type:intravenous       Mask difficulty assessment: 2 - vent by mask + OA or adjuvant +/- NMBA    Final Airway Details       Final airway type: endotracheal airway       Successful airway: ETT - single  Endotracheal Airway Details        ETT size (mm): 8.0       Cuffed: yes       Successful intubation technique: direct laryngoscopy       DL Blade Type: MAC 4       Grade View of Cords: 1       Adjucts: stylet       Position: Center       Measured from: lips       Secured at (cm): 24       Bite block used: None    Post intubation assessment        Placement verified by: capnometry, equal breath sounds and chest rise        Number of attempts at approach: 2       Number of other approaches attempted: 0       Secured with: tape       Ease of procedure: easy       Dentition: Intact and Unchanged       Dental guard used and removed. Dental Guard Type: Standard White.    Medication(s) Administered   Medication Administration Time: 11/16/2023 7:31 AM

## 2023-11-16 NOTE — OP NOTE
Operative Report    PATIENT Christopher Lindsey   DATE OF SURGERY:  11/16/2023      PREOPERATIVE DIAGNOSIS   Osteoarthritis of right knee [M17.11].    POSTOPERATIVE DIAGNOSIS   Osteoarthritis of right knee [M17.11].    PROCEDURE PERFORMED   right total knee arthroplasty     IMPLANTS  [employer] Triathlon CR femoral component, right size 4  2.   [employer] Tri[employer] universal tibial baseplate, size 5  3.   [employer] Tri[employer] N2VAC CS polyethylene, 9mm  4.   [employer] Tri[employer]alon N2VAC asymmetric patella, 35mm    SURGEON  Ajay Pendleton, DO     ASSISTANT   Lavon Patel PA-C. PA-C was needed for positioning, draping, retraction/protection of vital structures, assistance with instrumentation and correct implant placement, deep periarticular injection, closure including deep capsular layer and maintaining patient safety throughout the procedure.  Several of these duties can only be performed by a LEXUS and not a lesser trained surgical assistant.    ANESTHESIA  Spinal with Block      ESTIMATED BLOOD LOSS  Less than 50 ml    TOURNIQUET TIME:  45 minutes    COMPLICATIONS   None.      FINDINGS: Grade 4 degenerative changes were noted in medial and patellofemoral compartments with exposed bone and multiple osteophytes.  Varus deformity.  10 degree flexion contracture.    Specimens: none    INDICATION   Christopher Lindsey is a 73 year old male who has been followed for right-sided knee pain. X-rays showed severe degenerative osteoarthritis.  Nonoperative treatment measures failed to improved the patient's symptoms. The risks and benefits of further non-operative treatment vs surgical treatment were discussed.  Christopher Lindsey wished to proceed with surgery.  The patient attended a preoperative teaching class for knee replacement.  They were cleared by a medical doctor for joint arthroplasty.    INFORMED CONSENT  Christopher Lindsey was identified in the preoperative holding area and was identified using medical record number, name, and date of  birth, all of which were confirmed. The right knee was marked using an indelible marker and informed consent was signed. Once again, all risks and benefits as well as alternatives to surgical intervention were discussed with the patient in detail and all the questions were answered. Risks discussed included but were not limited to: persistent pain, infection, bleeding, scarring, stiffness, thromboembolic events, fracture, malalignment/malrotation, severe limb dysfunction, loss of limb, and loss of life. The patient verbally confirmed the consent and wished to proceed with surgery as scheduled.     TECHNIQUE   The anesthesia service then proceeded with regional anesthesia and Christopher Lindsey was taken to the operating room and placed on the operating table in a supine position. A spinal was then performed in the operating room.  A tourniquet was placed high on the operative thigh.  Care was taken to pad all bony prominences well. A ZAN stocking was placed on the contralateral leg.  The operative extremity was then prepped and draped in a standard orthopedic fashion using DuraPrep. A preprocedural timeout was then performed once again confirming the patient's correct identity, correct procedure, correct side, and correct site. In addition, allergy status and required equipment were reviewed. Three independent members of the operating room team agreed on the above-mentioned parameters.  It was also confirmed that the patient received IV antibiotics and TXA per protocol.    At this point the leg was exsanguinated and the tourniquet was inflated.  A standard midline approach to the knee was utilized.  Sharp dissection was performed down to the level of the capsule.   A standard midvastus arthrotomy was performed. The anterior horn of the medial meniscus was resected and a medial release was performed around the tibial plateau. Medial osteophytes were resected.    The knee was extended and patellar fat pad was excised  being careful to protect the patellar tendon. The patellar osteophytes were removed and the patella was turned using towel clips in the soft tissue to secure it.  The patella was sized with a caliper, and an oscillating saw was used to make a flush cut.  The size of the patella was determined, and the lugs holes were drilled.  The patella protector was then placed.     Attention was turned to the femur.  The knee was flexed, and the remnants of the ACL and anterior horn of lateral menisci were resected.  The notch osteophytes were then removed.  A drill was used to access the femoral canal in line with the center of the notch. A 5 degree guide on the flexi-brayden was impacted flush and pinned.  The distal valgus cut was made using an oscillating saw.      Attention was turned back to the Tibia.  The retractors were placed and the extramedullary guide was assembled.  The slope was checked and the distal aspect was centered on the ankle.  The jig was pinned proximally, and the  was used to determine the depth of resection.  The proximal tibial resection was performed using an oscillating saw being careful to protect the bone block and PCL.  The jig was removed and a reciprocating saw was used to resect the remainder of the bone wafers.  The island was then sculpted using a rongeur. Laminar spreaders were placed with the knee in extension and the menisci were removed both medially and laterally. A 9mm spacer block was placed to check the extension gap for adequate resection of bone and alignment/balance. This was found to be appropriate.    Attention was turned back to the distal femur. A tibial protector was placed. The 3-degree external rotation guide was pinned in place.   Alignment was confirmed using whitesides line. The knee was taken into extension and the anterior femur synovium was minimally resected. Sizing was done in extension, referencing off of the posterior femur. The knee was flexed and the 4-in-1  cutting block was pinned in place.  Careful attention was paid to protecting the soft tissue, and the anterior, posterior, and chamfer cuts were made. Lamina  was once again placed. The back of the femur was checked for osteophytes and remaining soft tissue, and a curved osteotome was used to remove the bone there, and do a minimal posterior release.      The tibia was exposed, and the tibial tray was pinned in the correct amount of external rotation.  The trial femur was placed.  The 9mm poly trial was inserted into place.  The trial patellar button was inserted as well.  The knee was examined and found to be well balanced in flexion and extension with good PCL tension.  The varus/valgus was stable. There was full extension and good flexion.  The patella tracked well. The femur lug holes were drilled and the femoral trial was removed.    The proximal tibia was exposed and the tibial tray was drilled using the guide. The punch was inserted carefully.  All trials were then removed.    The bony surfaces were irrigated with copious pulse lavage. The cement was mixed and the components were sequentially cemented with antibiotic cement, due to history of diabetes.  The tibia first, followed by the femur and patella.  At each stage, excess cement was meticulously cleared.  The trial poly was inserted and the cement was allowed to polymerize in full extension. At this point, the knee was once again checked for loose cement.  Betadine lavage performed and tourniquet deflated.  Hemostasis obtained.  The final irrigation was performed using pulsatile lavage. A portion of the local injection was placed in the posterior capsule, with care to avoid vascular structures.  The definitive poly insert was placed and secured.      Remaining local injection was injected into the anterior soft tissues. The wound was closed in layers with #1 StrataFix, 0 Vicryl, 2-0 vicryl, and 3-0 monocryl, followed by skin glue. The aquacel  dressing was applied. Followed by a full leg ACE wrap.    The patient was taken to the PACU in stable condition. The case was clean.  All sponge and needle counts were correct at the end of the case.    The patient will be weight bearing as tolerated.  They will receive 24 hours of prophylactic antibiotics as well as DVT prophylaxis with mechanical and chemical means.     Implant Name Type Inv. Item Serial No.  Lot No. LRB No. Used Action   IMP BONE CEMENT SIMPLEX W/GENTAMICIN 6195-1-001 - [id number] Cement, Bone IMP BONE CEMENT SIMPLEX W/GENTAMICIN 6195-1-001  [employer] [id number] Right 2 Implanted   IMP COMP PATELLA HOWM TRI 35 10MM 5551-L-350 - [id number] Total Joint Component/Insert IMP COMP PATELLA HOWM TRI 35 10MM 5551-L-350  [employer] [id number] Right 1 Implanted   IMP INSERT BASEPLATE TIBIAL HOWM TRI 5 5521-B-500 - [id number] Total Joint Component/Insert IMP INSERT BASEPLATE TIBIAL HOWM TRI 5 5521-B-500  [employer] [id number] Right 1 Implanted   IMP COMP FEM STRK TRIATHLN CR RT 4 5510-F-402 - [id number] Total Joint Component/Insert IMP COMP FEM STRK TRIATHLN CR RT 4 5510-F-402  [employer] [id number] Right 1 Implanted   INSERT TIB 5 9MM KN PE CNDRL STAB BRNG TRTHLN STRL LF - [id number] Total Joint Component/Insert INSERT TIB 5 9MM KN PE CNDRL STAB BRNG TRTHLN STRL LF  JO[employer] [id number] Right 1 Implanted       Ajay Pendleton DO

## 2023-11-16 NOTE — INTERVAL H&P NOTE
"I have reviewed the surgical (or preoperative) H&P that is linked to this encounter, and examined the patient. There are no significant changes    Clinical Conditions Present on Arrival:  Clinically Significant Risk Factors Present on Admission                  # DMII: A1C = 6.8 % (Ref range: 0.0 - 5.6 %) within past 6 months  # Obesity: Estimated body mass index is 37.95 kg/m  as calculated from the following:    Height as of this encounter: 1.753 m (5' 9\").    Weight as of this encounter: 116.6 kg (257 lb).       "

## 2023-11-16 NOTE — PROVIDER NOTIFICATION
MDA notified patient's blood sugar 193mg/dL. Per MDA, no interventions needed by RN at this time. Will continue to monitor patient.

## 2023-11-16 NOTE — ANESTHESIA CARE TRANSFER NOTE
Patient: Christopher Lindsey    Procedure: Procedure(s):  RIGHT TOTAL KNEE ARTHROPLASTY       Diagnosis: Osteoarthritis of right knee [M17.11]  Diagnosis Additional Information: No value filed.    Anesthesia Type:   No value filed.     Note:    Oropharynx: oropharynx clear of all foreign objects  Level of Consciousness: awake  Oxygen Supplementation: face mask  Level of Supplemental Oxygen (L/min / FiO2): 6  Independent Airway: airway patency satisfactory and stable  Dentition: dentition unchanged  Vital Signs Stable: post-procedure vital signs reviewed and stable  Report to RN Given: handoff report given  Patient transferred to: PACU    Handoff Report: Identifed the Patient, Identified the Reponsible Provider, Reviewed the pertinent medical history, Discussed the surgical course, Reviewed Intra-OP anesthesia mangement and issues during anesthesia, Set expectations for post-procedure period and Allowed opportunity for questions and acknowledgement of understanding      Vitals:  Vitals Value Taken Time   /82 11/16/23 0950   Temp 37.2  C (98.96  F) 11/16/23 0952   Pulse 96 11/16/23 0952   Resp 26 11/16/23 0952   SpO2 96 % 11/16/23 0952   Vitals shown include unfiled device data.    Electronically Signed By: SULLY Wheat CRNA  November 16, 2023  9:54 AM

## 2023-11-17 ENCOUNTER — APPOINTMENT (OUTPATIENT)
Dept: OCCUPATIONAL THERAPY | Facility: CLINIC | Age: 73
End: 2023-11-17
Attending: ORTHOPAEDIC SURGERY
Payer: MEDICARE

## 2023-11-17 ENCOUNTER — APPOINTMENT (OUTPATIENT)
Dept: PHYSICAL THERAPY | Facility: CLINIC | Age: 73
End: 2023-11-17
Attending: ORTHOPAEDIC SURGERY
Payer: MEDICARE

## 2023-11-17 VITALS
OXYGEN SATURATION: 92 % | BODY MASS INDEX: 38.06 KG/M2 | SYSTOLIC BLOOD PRESSURE: 124 MMHG | WEIGHT: 257 LBS | HEART RATE: 79 BPM | TEMPERATURE: 97.9 F | RESPIRATION RATE: 18 BRPM | HEIGHT: 69 IN | DIASTOLIC BLOOD PRESSURE: 71 MMHG

## 2023-11-17 LAB
ANION GAP SERPL CALCULATED.3IONS-SCNC: 8 MMOL/L (ref 7–15)
BUN SERPL-MCNC: 10.8 MG/DL (ref 8–23)
CALCIUM SERPL-MCNC: 8.7 MG/DL (ref 8.8–10.2)
CHLORIDE SERPL-SCNC: 102 MMOL/L (ref 98–107)
CREAT SERPL-MCNC: 0.69 MG/DL (ref 0.67–1.17)
DEPRECATED HCO3 PLAS-SCNC: 29 MMOL/L (ref 22–29)
EGFRCR SERPLBLD CKD-EPI 2021: >90 ML/MIN/1.73M2
ERYTHROCYTE [DISTWIDTH] IN BLOOD BY AUTOMATED COUNT: 13.6 % (ref 10–15)
GLUCOSE BLDC GLUCOMTR-MCNC: 164 MG/DL (ref 70–99)
GLUCOSE BLDC GLUCOMTR-MCNC: 185 MG/DL (ref 70–99)
GLUCOSE SERPL-MCNC: 160 MG/DL (ref 70–99)
HCT VFR BLD AUTO: 41.3 % (ref 40–53)
HGB BLD-MCNC: 13.3 G/DL (ref 13.3–17.7)
MCH RBC QN AUTO: 32.8 PG (ref 26.5–33)
MCHC RBC AUTO-ENTMCNC: 32.2 G/DL (ref 31.5–36.5)
MCV RBC AUTO: 102 FL (ref 78–100)
PLATELET # BLD AUTO: 153 10E3/UL (ref 150–450)
POTASSIUM SERPL-SCNC: 4.4 MMOL/L (ref 3.4–5.3)
RBC # BLD AUTO: 4.06 10E6/UL (ref 4.4–5.9)
SODIUM SERPL-SCNC: 139 MMOL/L (ref 135–145)
WBC # BLD AUTO: 9.9 10E3/UL (ref 4–11)

## 2023-11-17 PROCEDURE — 97116 GAIT TRAINING THERAPY: CPT | Mod: GP

## 2023-11-17 PROCEDURE — 82962 GLUCOSE BLOOD TEST: CPT

## 2023-11-17 PROCEDURE — 250N000013 HC RX MED GY IP 250 OP 250 PS 637: Performed by: PHYSICIAN ASSISTANT

## 2023-11-17 PROCEDURE — 99232 SBSQ HOSP IP/OBS MODERATE 35: CPT | Performed by: INTERNAL MEDICINE

## 2023-11-17 PROCEDURE — 97530 THERAPEUTIC ACTIVITIES: CPT | Mod: GP

## 2023-11-17 PROCEDURE — 85027 COMPLETE CBC AUTOMATED: CPT | Performed by: FAMILY MEDICINE

## 2023-11-17 PROCEDURE — 97535 SELF CARE MNGMENT TRAINING: CPT | Mod: GO

## 2023-11-17 PROCEDURE — 97166 OT EVAL MOD COMPLEX 45 MIN: CPT | Mod: GO

## 2023-11-17 PROCEDURE — 97110 THERAPEUTIC EXERCISES: CPT | Mod: GP

## 2023-11-17 PROCEDURE — 36415 COLL VENOUS BLD VENIPUNCTURE: CPT | Performed by: FAMILY MEDICINE

## 2023-11-17 PROCEDURE — 258N000003 HC RX IP 258 OP 636: Performed by: PHYSICIAN ASSISTANT

## 2023-11-17 PROCEDURE — 80048 BASIC METABOLIC PNL TOTAL CA: CPT | Performed by: FAMILY MEDICINE

## 2023-11-17 PROCEDURE — 250N000013 HC RX MED GY IP 250 OP 250 PS 637: Performed by: FAMILY MEDICINE

## 2023-11-17 RX ADMIN — OXYCODONE HYDROCHLORIDE 10 MG: 5 TABLET ORAL at 09:51

## 2023-11-17 RX ADMIN — SODIUM CHLORIDE, POTASSIUM CHLORIDE, SODIUM LACTATE AND CALCIUM CHLORIDE: 600; 310; 30; 20 INJECTION, SOLUTION INTRAVENOUS at 00:15

## 2023-11-17 RX ADMIN — FENOFIBRATE 48 MG: 48 TABLET, COATED ORAL at 10:21

## 2023-11-17 RX ADMIN — OLANZAPINE 10 MG: 10 TABLET, FILM COATED ORAL at 10:21

## 2023-11-17 RX ADMIN — ALLOPURINOL 100 MG: 100 TABLET ORAL at 09:52

## 2023-11-17 RX ADMIN — SENNOSIDES AND DOCUSATE SODIUM 1 TABLET: 8.6; 5 TABLET ORAL at 09:51

## 2023-11-17 RX ADMIN — DILTIAZEM HYDROCHLORIDE 180 MG: 180 CAPSULE, EXTENDED RELEASE ORAL at 09:51

## 2023-11-17 RX ADMIN — INSULIN ASPART 1 UNITS: 100 INJECTION, SOLUTION INTRAVENOUS; SUBCUTANEOUS at 07:02

## 2023-11-17 RX ADMIN — ASPIRIN 81 MG: 81 TABLET, COATED ORAL at 09:51

## 2023-11-17 RX ADMIN — FLUOXETINE 20 MG: 20 CAPSULE ORAL at 09:51

## 2023-11-17 RX ADMIN — POLYETHYLENE GLYCOL 3350 17 G: 17 POWDER, FOR SOLUTION ORAL at 09:52

## 2023-11-17 RX ADMIN — OXYCODONE HYDROCHLORIDE 10 MG: 5 TABLET ORAL at 00:15

## 2023-11-17 RX ADMIN — BUPROPION HYDROCHLORIDE 150 MG: 150 TABLET, EXTENDED RELEASE ORAL at 09:51

## 2023-11-17 RX ADMIN — LOSARTAN POTASSIUM 50 MG: 50 TABLET, FILM COATED ORAL at 09:52

## 2023-11-17 ASSESSMENT — ACTIVITIES OF DAILY LIVING (ADL)
ADLS_ACUITY_SCORE: 21

## 2023-11-17 NOTE — PLAN OF CARE
Goal Outcome Evaluation:       Patient vital signs are at baseline: No,  Reason:  Pt on 1L O2 via NC  Patient able to ambulate as they were prior to admission or with assist devices provided by therapies during their stay:  Yes, up with assist of 1 with gait belt and walker  Patient MUST void prior to discharge:  Yes  Patient able to tolerate oral intake:  Yes  Pain has adequate pain control using Oral analgesics:  Yes utilizing PRN oxycodone, scheduled pain meds, ice  Does patient have an identified :  Yes  Has goal D/C date and time been discussed with patient:  Yes

## 2023-11-17 NOTE — PROGRESS NOTES
Patient vital signs are at baseline: Yes  Patient able to ambulate as they were prior to admission or with assist devices provided by therapies during their stay:  Yes  Patient MUST void prior to discharge:  Yes  Patient able to tolerate oral intake:  Yes  Pain has adequate pain control using Oral analgesics:  Yes  Does patient have an identified :  Yes, spouse  Has goal D/C date and time been discussed with patient:  Yes. Discharge today. Spouse will transport.

## 2023-11-17 NOTE — DISCHARGE SUMMARY
"ORTHOPEDIC DISCHARGE SUMMARY       Christopher Lindsey,  1950, MRN [id number]    Admission Date: 2023      Admission Diagnoses: Osteoarthritis of right knee [M17.11]  Primary osteoarthritis of knee [M17.10]     Discharge Date: 2023    Post-operative Day:  1 Day Post-Op    Reason for Admission: The patient was admitted for the following: Procedure(s):  RIGHT TOTAL KNEE ARTHROPLASTY    BRIEF HOSPITAL COURSE   Christopher Lindsey is a pleasant 73 year old male who underwent the aforementioned procedure with Dr. ePndleton on 2023. There were no intraoperative complications and the patient was transferred to the recovery room and later the orthopedic unit in stable condition. Once the patient reached the orthopedic floor our orthopedic pain protocol was implemented along with the following:    Anticoagulation Medications: ASA  Therapy: PT and OT  Activity: WBAT  Bracing: None    Consultations during Admission: Hospitalist service for medical management     COMPLICATIONS/SIGNIFICANT FINDINGS    None    DISCHARGE INFORMATION   Condition at discharge: Good  Discharge destination: Home  Patient was seen by myself on the date of discharge.    FOLLOW UP CARE   Follow up with orthopedics in 2 weeks or sooner should the need arise. Ortho will continue to manage pain control, post op anticoagulation and incision care.     Follow up with your PCP for management of chronic medical problems and to evaluate for post op medical complications including constipation, nausea/vomiting, DVT/PE, anemia, changes in blood pressure, fevers/chills, urinary retention and atelectasis/pneumonia.     Subjective   Patient is doing well on POD #1. Pain is well controlled with oral medications. Ambulating. Tolerating oral intake. Voiding. Hgb 13.3.    Physical Exam   /71 (BP Location: Right arm)   Pulse 79   Temp 97.9  F (36.6  C) (Oral)   Resp 18   Ht 1.753 m (5' 9\")   Wt 116.6 kg (257 lb)   SpO2 92%   BMI 37.95 kg/m  "   The patient is A&Ox3. Appears comfortable sitting at bedside.   Sensation is intact.  Dorsiflexion and plantar flexion is intact. Quad is firing.  Dorsalis pedis pulse intact.  Calves are soft and non-tender. Negative Ela's.  The incision is covered. Dressing C/D/I.    Pertinent Results at Discharge     Hemoglobin   Date/Time Value Ref Range Status   11/17/2023 05:26 AM 13.3 13.3 - 17.7 g/dL Final   11/03/2023 08:56 AM 14.8 13.3 - 17.7 g/dL Final   10/02/2023 12:13 PM 13.8 13.3 - 17.7 g/dL Final     Platelet Count   Date/Time Value Ref Range Status   11/17/2023 05:26  150 - 450 10e3/uL Final   11/03/2023 08:56  150 - 450 10e3/uL Final   10/02/2023 12:13  150 - 450 10e3/uL Final       Problem List   Principal Problem:    Primary osteoarthritis of knee      Belinda Bailey PA-C/Dr. Pendleton  [facility]  [phone]  Date: 11/17/2023  Time: 10:30 AM

## 2023-11-17 NOTE — PROGRESS NOTES
[facility]    Medicine Progress Note - Hospitalist Service    Date of Admission:  11/16/2023    Assessment & Plan    73 year old  male with a history of hypertension, DM2, dyslipidemia, moderate obesity, depression, underwent right total knee arthroplasty.  EBL 25 mL.    Essential hypertension  Losartan 50 mg daily  Diltiazem 180 mg daily  Resume at discharge.     History of SVT  Heart rate is stable  Continue diltiazem as above.     Dyslipidemia  Lipitor 20 mg daily  Tricor 48 mg daily     DM2  Metformin 500 mg twice a day, on hold  Diabetic diet and insulin sliding scale  Globin A1c 6.8     Depression and anxiety  Has stable mood and affect  Fluoxetine 20 mg daily  Olanzapine 10 mg daily  Wellbutrin  mg daily     Moderate obesity Body mass index is 37.95 kg/m .  Modification of lifestyle for weight loss when able     Obstructive sleep apnea  Patient has not utilized BiPAP due to intolerance to different masks tried.  Patient will discuss sleep clinic referral with primary care provider at discharge.     History of alcohol use  Close monitoring for alcohol withdrawal     Status post right total knee arthroplasty  Physical and Occupational Therapy  DVT prophylaxis per orthopedics, aspirin 81 mg twice a day  Pain control with Tylenol 975 mg every 8 hours, 650 mg every 4 hours as needed, oxycodone 5 to 10 mg every 4 hours as needed, IV Dilaudid 0.2 to 0.4 mg every 2 hours as needed        Diet: Advance Diet as Tolerated: Regular Diet Adult  Discharge Instruction - Regular Diet Adult    DVT Prophylaxis: Defer to primary service  Dye Catheter: Not present  Lines: None     Cardiac Monitoring: None  Code Status: Full Code      Clinically Significant Risk Factors Present on Admission                  # Hypertension: Noted on problem list   # Acute Respiratory Failure: Documented O2 saturation < 91%.  Continue supplemental oxygen as needed    # DMII: A1C = 6.8 % (Ref range: 0.0 - 5.6 %)  "within past 6 months    # Obesity: Estimated body mass index is 37.95 kg/m  as calculated from the following:    Height as of this encounter: 1.753 m (5' 9\").    Weight as of this encounter: 116.6 kg (257 lb).              Disposition Plan     Expected Discharge Date: 11/17/2023                    Lavon Lovett DO  Hospitalist Service  [facility]  Securely message with [employer] (more info)  Text page via [employer] Paging/Directory   ______________________________________________________________________    Interval History   Patient with no new complaints.  Currently on half liter per minute via nasal cannula.  Saturating 95 to 97%.  Patient normally saturates between 90 and 92% at home.  He denies any chest pain or shortness of breath.  He is tolerating diet.    Physical Exam   Vital Signs: Temp: 97.9  F (36.6  C) Temp src: Oral BP: 124/71 Pulse: 79   Resp: 18 SpO2: 92 % O2 Device: None (Room air) Oxygen Delivery: 1/2 LPM  Weight: 257 lbs 0 oz    GENERAL:  Alert, appears comfortable, in no acute distress, appears stated age, obese   HEAD:  Normocephalic, without obvious abnormality, atraumatic   NECK: Supple, symmetrical, trachea midline   BACK:   Symmetric, no curvature, ROM normal   LUNGS:   Clear to auscultation bilaterally, no rales, rhonchi, or wheezing, symmetric chest rise on inhalation, respirations unlabored   CHEST WALL:  No tenderness or deformity   HEART:  Regular rate and rhythm, S1 and S2 normal, no murmur    ABDOMEN:   Soft, non-tender, bowel sounds active   no masses, no organomegaly, no rebound or guarding   EXTREMITIES: Status post right total knee arthroplasty, no calf tenderness.   SKIN: No rashes in the visualized areas   NEURO: Alert, oriented x 3   PSYCH: Cooperative, behavior is appropriate        Medical Decision Making       40 MINUTES SPENT BY ME on the date of service doing chart review, history, exam, documentation & further activities per the note.      Data "     I have personally reviewed the following data over the past 24 hrs:    9.9  \   13.3   / 153     139 102 10.8 /  164 (H)   4.4 29 0.69 \       Imaging results reviewed over the past 24 hrs:   Recent Results (from the past 24 hour(s))   XR Knee Port Right 1/2 Views    Narrative    EXAM: XR KNEE PORT RIGHT 1/2 VIEWS  LOCATION: [facility]  DATE: 11/16/2023    INDICATION: Post Op Total Knee  COMPARISON: None.      Impression    IMPRESSION: Completed right total knee arthroplasty. Normal joint alignment. No evidence of periprosthetic fracture or other immediate complication. Postoperative air in the joint space and surrounding soft tissues.

## 2023-11-17 NOTE — PROGRESS NOTES
11/17/23 0900   Appointment Info   Signing Clinician's Name / Credentials (OT) GARY Aguilar/L, CLT   Rehab Comments (OT) OT antonio   Quick Adds   Quick Adds Certification   Living Environment   People in Home spouse   Self-Care   Usual Activity Tolerance good   Current Activity Tolerance moderate   Equipment Currently Used at Home none   Fall history within last six months no   Activity/Exercise/Self-Care Comment Indep with ADL, but spouse can assist PRN   General Information   Onset of Illness/Injury or Date of Surgery 11/16/23   Referring Physician Dr. Pendleton   Patient/Family Therapy Goal Statement (OT) home with spouse   Additional Occupational Profile Info/Pertinent History of Current Problem mod:TKA   Existing Precautions/Restrictions weight bearing   Right Lower Extremity (Weight-bearing Status) weight-bearing as tolerated (WBAT)   Cognitive Status Examination   Orientation Status orientation to person, place and time   Affect/Mental Status (Cognitive) WFL   Visual Perception   Visual Impairment/Limitations WFL   Sensory   Sensory Quick Adds sensation intact   Pain Assessment   Patient Currently in Pain No   Posture   Posture not impaired   Range of Motion Comprehensive   General Range of Motion no range of motion deficits identified   Strength Comprehensive (MMT)   General Manual Muscle Testing (MMT) Assessment   (some gen weakness)   Muscle Tone Assessment   Muscle Tone Quick Adds No deficits were identified   Coordination   Upper Extremity Coordination No deficits were identified   Bed Mobility   Comment (Bed Mobility) N/T-pt to sleep in recliner   Transfers   Transfer Comments min A   Balance   Balance Comments decreased   Activities of Daily Living   BADL Assessment/Intervention lower body dressing;grooming   Lower Body Dressing Assessment/Training   [place] Level (Lower Body Dressing) maximum assist (25% patient effort)   Grooming Assessment/Training   [place] Level (Grooming) contact  guard assist   Clinical Impression   Criteria for Skilled Therapeutic Interventions Met (OT) Yes, treatment indicated   OT Diagnosis decr ADL indep/safety   Influenced by the following impairments s/p TKA   OT Problem List-Impairments impacting ADL activity tolerance impaired;balance;flexibility;mobility;strength   Assessment of Occupational Performance 3-5 Performance Deficits   Identified Performance Deficits dressing, home mgmt, bathing, func mob   Planned Therapy Interventions (OT) ADL retraining;transfer training;progressive activity/exercise   Clinical Decision Making Complexity (OT) detailed assessment/moderate complexity   Risk & Benefits of therapy have been explained care plan/treatment goals reviewed;patient   OT Total Evaluation Time   OT Eval, Moderate Complexity Minutes (31683) 10   Therapy Certification   Medical Diagnosis TKA   Start of Care Date 11/17/23   Certification date from 11/17/23   Certification date to 12/17/23   OT Goals   Therapy Frequency (OT) One time eval and treatment   OT Predicted Duration/Target Date for Goal Attainment 11/17/23   OT Goals Lower Body Dressing;Transfers   OT: Lower Body Dressing Modified independent;using adaptive equipment;within precautions;Completed;Goal Met  (except shoes)   OT: Transfer Modified independent;Goal Met;Completed  (chair)   Interventions   Interventions Quick Adds Self-Care/Home Management   Self-Care/Home Management   Self-Care/Home Mgmt/ADL, Compensatory, Meal Prep Minutes (24428) 30   Symptoms Noted During/After Treatment (Meal Preparation/Planning Training) shortness of breath   Treatment Detail/Skilled Intervention Educ pt in PLB for incr pain. Educated pt in all transfers including  toilet, chair, and shower. Pt mod I with FWW for chair with cues for tech and PLB after instruction. Educ in LE dressing with precautions for post-surgery. Pt mod I for LE dressing including pants/socks(shoes with mod A) using AE after instruction. UB dress with  mod I after set-up.  Handout given for PLB. All questions answered. All tasks requ'd incr time/effort second to pain/fatigue/SOB.   OT Discharge Planning   OT Plan OT d/c   OT Discharge Recommendation (DC Rec)   (defer to ortho)   OT Rationale for DC Rec spouse can assist   OT Brief overview of current status mod I for BADL   OT Equipment Needed at Discharge   (sock aide)   Total Session Time   Timed Code Treatment Minutes 30   Total Session Time (sum of timed and untimed services) 40    [facility]  OUTPATIENT OCCUPATIONAL THERAPY  EVALUATION  PLAN OF TREATMENT FOR OUTPATIENT REHABILITATION  (COMPLETE FOR INITIAL CLAIMS ONLY)  Patient's Last Name, First Name, M.I.  YOB: 1950  Christopher Lindsey                          Provider's Name  [facility] Medical Record No.  [id number]                             Onset Date:  11/16/23   Start of Care Date:  11/17/23   Type:     ___PT   _X_OT   ___SLP Medical Diagnosis:  TKA                    OT Diagnosis:  decr ADL indep/safety Visits from SOC:  1     See note for plan of treatment, functional goals and certification details    I CERTIFY THE NEED FOR THESE SERVICES FURNISHED UNDER        THIS PLAN OF TREATMENT AND WHILE UNDER MY CARE     (Physician co-signature of this document indicates review and certification of the therapy plan).

## 2023-11-17 NOTE — PROGRESS NOTES
Care Management Follow Up    Length of Stay (days): 0    Expected Discharge Date: 11/17/2023     Concerns to be Addressed:     medical progression   Patient plan of care discussed at interdisciplinary rounds: Yes    Anticipated Discharge Disposition:  home      Anticipated Discharge Services:  none   Anticipated Discharge DME:  per treatment team     Patient/family educated on Medicare website which has current facility and service quality ratings:  NA  Education Provided on the Discharge Plan:  yes  Patient/Family in Agreement with the Plan:  yes    Referrals Placed by CM/SW:  none required  Private pay costs discussed: Not applicable    Additional Information:  SW reviewed chart and therapy evaluations.  Pt lives at home with spouse, independent at baseline.  Anticipate Pt will return home with support from family.  No CM needs identified. Family to transport.     JANA Walters

## 2023-11-17 NOTE — PROGRESS NOTES
Discharge AVS reviewed with patient and wife.  Questions answered and teachings acknowledged.  Belongings and paperwork sent with via wife transport. Orthopedic [employer] acknowledged (YES)

## 2023-11-17 NOTE — PLAN OF CARE
Problem: Adult Inpatient Plan of Care  Goal: Absence of Hospital-Acquired Illness or Injury  Outcome: Progressing  Intervention: Identify and Manage Fall Risk  Recent Flowsheet Documentation  Taken 11/16/2023 1945 by Brayan Dial, RN  Safety Promotion/Fall Prevention:   activity supervised   assistive device/personal items within reach   clutter free environment maintained   nonskid shoes/slippers when out of bed   room door open   room near nurse's station   room organization consistent   safety round/check completed     Problem: Adult Inpatient Plan of Care  Goal: Optimal Comfort and Wellbeing  Outcome: Progressing  Intervention: Monitor Pain and Promote Comfort  Recent Flowsheet Documentation  Taken 11/16/2023 1940 by Brayan Dial, RN  Pain Management Interventions:   medication (see MAR)   care clustered   cold applied   pillow support provided   quiet environment facilitated   repositioned   rest         Patient vital signs are at baseline: Yes  Patient able to ambulate as they were prior to admission or with assist devices provided by therapies during their stay:  Yes  Patient MUST void prior to discharge:  Yes  Patient able to tolerate oral intake:  Yes  Pain has adequate pain control using Oral analgesics:  Yes  Does patient have an identified :  Yes  Has goal D/C date and time been discussed with patient:  Yes

## 2023-11-17 NOTE — PROGRESS NOTES
"Orthopedic Progress Note      Assessment: 1 Day Post-Op  S/P Procedure(s):  RIGHT TOTAL KNEE ARTHROPLASTY     Plan:   - Continue PT/OT  - Weightbearing status: WBAT  - Anticoagulation: ASA 81mg BID in addition to SCDs, tarah stockings and early ambulation.  - Discharge planning: Planning for discharge to home pending medical clearance.      Subjective:  Pain: Moderate  Chest pain, SOB: None, on 1L supplemental O2  Nausea, Vomiting:  None  Lightheadedness, Dizziness:  None  Neuro:  Patient denies new onset numbness or paresthesias    Patient is resting comfortably in chair with wife present. He just completed session with PT. He is on supplemental O2. Patient and wife that his spO2 typically runs low and they do want to follow up for further evaluation after discharge. Not yet passing gas. Voiding. Pain well controlled with oral pain medications.     Objective:  /71 (BP Location: Right arm)   Pulse 79   Temp 97.9  F (36.6  C) (Oral)   Resp 18   Ht 1.753 m (5' 9\")   Wt 116.6 kg (257 lb)   SpO2 92%   BMI 37.95 kg/m    The patient is A&Ox3. Appears comfortable.   Sensation is intact.  Dorsiflexion and plantar flexion is intact.  Dorsalis pedis pulse intact.  Calves are soft and non-tender. Negative Ela's.  The incision is covered. Dressing C/D/I.    No drain.    Pertinent Labs   Lab Results: personally reviewed.   No results found for: \"INR\", \"PROTIME\"  Lab Results   Component Value Date    WBC 9.9 11/17/2023    HGB 13.3 11/17/2023    HCT 41.3 11/17/2023     (H) 11/17/2023     11/17/2023     Lab Results   Component Value Date     11/17/2023    CO2 29 11/17/2023         Report completed by:  Belinda Bailey PA-C/Dr. Pendleton  [facility]    Date: 11/17/2023  Time: 9:31 AM   "

## 2023-11-17 NOTE — PROGRESS NOTES
11/16/23 1500   Appointment Info   Signing Clinician's Name / Credentials (PT) Aakash Murillo, PT, DPT   Living Environment   People in Home spouse   Current Living Arrangements house   Home Accessibility stairs to enter home   Number of Stairs, Main Entrance 2   Self-Care   Usual Activity Tolerance good   Current Activity Tolerance moderate   Equipment Currently Used at Home none   Fall history within last six months no   General Information   Onset of Illness/Injury or Date of Surgery 11/16/23   Referring Physician Dr. Pendleton   Patient/Family Therapy Goals Statement (PT) Improve overall mobility   Pertinent History of Current Problem (include personal factors and/or comorbidities that impact the POC) s/p TKA   Existing Precautions/Restrictions weight bearing   Weight-Bearing Status - LLE full weight-bearing   Weight-Bearing Status - RLE weight-bearing as tolerated   Range of Motion (ROM)   ROM Comment WFL, decreased RLE s/p TKA   Strength (Manual Muscle Testing)   Strength Comments WFL   Bed Mobility   Bed Mobility supine-sit   Supine-Sit [place] (Bed Mobility) supervision   Assistive Device (Bed Mobility) bed rails   Transfers   Transfers sit-stand transfer   Sit-Stand Transfer   Sit-Stand [place] (Transfers) contact guard   Assistive Device (Sit-Stand Transfers) walker, front-wheeled   Gait/Stairs (Locomotion)   [place] Level (Gait) contact guard   Assistive Device (Gait) walker, front-wheeled   Distance in Feet (Gait) 10   Pattern (Gait) step-through   Deviations/Abnormal Patterns (Gait) edvin decreased;stride length decreased   Clinical Impression   Criteria for Skilled Therapeutic Intervention Yes, treatment indicated   PT Diagnosis (PT) Impaired functional mobility   Influenced by the following impairments Weakness, pain   Functional limitations due to impairments Transfers, gait, stairs   Clinical Presentation (PT Evaluation Complexity) stable   Clinical Presentation Rationale Pt presents  as medically diagnosed   Clinical Decision Making (Complexity) low complexity   Planned Therapy Interventions (PT) gait training;home exercise program;patient/family education;ROM (range of motion);stair training;strengthening;transfer training   Risk & Benefits of therapy have been explained care plan/treatment goals reviewed;patient;spouse/significant other   PT Total Evaluation Time   PT Eval, Low Complexity Minutes (20137) 10   Physical Therapy Goals   PT Frequency Daily   PT Predicted Duration/Target Date for Goal Attainment 11/18/23   PT Goals Transfers;Gait;Stairs;PT Goal 1   PT: Transfers Supervision/stand-by assist;Sit to/from stand   PT: Gait Supervision/stand-by assist;Rolling walker;100 feet   PT: Stairs Supervision/stand-by assist;2 stairs;Rail on both sides   PT: Goal 1 I with TKA HEP   Interventions   Interventions Quick Adds Gait Training;Therapeutic Activity   Therapeutic Activity   Therapeutic Activities: dynamic activities to improve functional performance Minutes (74188) 15   Symptoms Noted During/After Treatment None   Treatment Detail/Skilled Intervention Supine to sit Mod I with HOB elevated and use of bed rail, cues throughout for hand placement and sequencing. SBA at EOB. Sit<>stand CGA. Increased time for set up and due to this being first time pt got up.   Gait Training   Gait Training Minutes (32180) 3   Symptoms Noted During/After Treatment (Gait Training) fatigue;increased pain   Treatment Detail/Skilled Intervention Pt amb around the room CGA with FWW, PT managing O2, pt on 3L, and IV pole. No LOB or adverse s/s other than mild increase in pain. Encouraged amb with nursing tonight as able.   Distance in Feet 20'   [place] Level (Gait Training) contact guard   Physical Assistance Level (Gait Training) supervision;verbal cues   Weight Bearing (Gait Training) weight-bearing as tolerated   Assistive Device (Gait Training) rolling walker   Pattern Analysis (Gait Training) swing-to gait    Gait Analysis Deviations decreased edvin;decreased step length   PT Discharge Planning   PT Plan Increase amb, stair trial, TKA HEP   PT Discharge Recommendation (DC Rec)   (Defer to ortho team)   PT Rationale for DC Rec Pt amb in the room, transferring well, expect no mobility concerns with d/c tomorrow.   PT Brief overview of current status CGA transfers and amb in the room with FWW   PT Equipment Needed at Discharge   (Has FWW)

## 2023-11-21 ENCOUNTER — DOCUMENTATION ONLY (OUTPATIENT)
Dept: OTHER | Facility: CLINIC | Age: 73
End: 2023-11-21
Payer: MEDICARE

## 2023-11-28 DIAGNOSIS — F33.42 RECURRENT MAJOR DEPRESSIVE DISORDER, IN FULL REMISSION (H): ICD-10-CM

## 2023-12-06 ENCOUNTER — PATIENT OUTREACH (OUTPATIENT)
Dept: GASTROENTEROLOGY | Facility: CLINIC | Age: 73
End: 2023-12-06
Payer: MEDICARE

## 2023-12-06 DIAGNOSIS — Z12.11 SPECIAL SCREENING FOR MALIGNANT NEOPLASMS, COLON: Primary | ICD-10-CM

## 2023-12-06 NOTE — PROGRESS NOTES
"Patient has a history of polyps and surveillance colonoscopy is overdue. Patient meets criteria for CRC high risk standing order protocol.    CRC Screening Colonoscopy Referral Review    Patient meets the inclusion criteria for screening colonoscopy standing order.    Ordering/Referring Provider:  Rashi Nichols MD    BMI: Estimated body mass index is 37.95 kg/m  as calculated from the following:    Height as of 11/16/23: 1.753 m (5' 9\").    Weight as of 11/16/23: 116.6 kg (257 lb).     Sedation:  Does patient have any of the following conditions affecting sedation?  Hx of chemical dependency: MAC sedation recommended    Previous Scopes:  Any previous recommendations or follow up needs based on previous scope?  na / No recommendations.    Medical Concerns to Postpone Order:  Does patient have any of the following medical concerns that should postpone/delay colonoscopy referral?  No medical conditions affecting colonoscopy referral.    Final Referral Details:  Based on patient's medical history patient is appropriate for referral order with MAC/deep sedation.   BMI<= 45 45 < BMI <= 48 48 < BMI < = 50  BMI > 50   No Restrictions No [facility] ASC  No [facility] with exceptions Hospital Only OR Only     "

## 2023-12-22 DIAGNOSIS — I10 BENIGN ESSENTIAL HYPERTENSION: ICD-10-CM

## 2023-12-27 RX ORDER — LOSARTAN POTASSIUM 50 MG/1
TABLET ORAL
Qty: 90 TABLET | Refills: 0 | Status: SHIPPED | OUTPATIENT
Start: 2023-12-27 | End: 2024-03-27

## 2024-01-03 ENCOUNTER — TRANSFERRED RECORDS (OUTPATIENT)
Dept: HEALTH INFORMATION MANAGEMENT | Facility: CLINIC | Age: 74
End: 2024-01-03
Payer: MEDICARE

## 2024-01-17 ENCOUNTER — TRANSFERRED RECORDS (OUTPATIENT)
Dept: HEALTH INFORMATION MANAGEMENT | Facility: CLINIC | Age: 74
End: 2024-01-17
Payer: MEDICARE

## 2024-01-29 DIAGNOSIS — F33.1 MODERATE EPISODE OF RECURRENT MAJOR DEPRESSIVE DISORDER (H): ICD-10-CM

## 2024-01-29 RX ORDER — OLANZAPINE 5 MG/1
TABLET ORAL
Qty: 180 TABLET | Refills: 1 | Status: SHIPPED | OUTPATIENT
Start: 2024-01-29 | End: 2024-07-29

## 2024-02-14 NOTE — PROGRESS NOTES
Discharge plan according to [facility]:     01/28/24 0713   Discharge Planning   Patient/Family Anticipates Transition to home   Concerns to be Addressed all concerns addressed in this encounter   Living Arrangements   People in Home spouse   Type of Residence Private Residence   Is your private residence a single family home or apartment? Single family home   Bathroom Shower/Tub Walk-in shower   Equipment Currently Used at Home none   Support System   Support Systems Spouse/Significant Other   Do you have someone available to stay with you one or two nights once you are home? Yes

## 2024-02-19 ENCOUNTER — OFFICE VISIT (OUTPATIENT)
Dept: FAMILY MEDICINE | Facility: CLINIC | Age: 74
End: 2024-02-19
Payer: MEDICARE

## 2024-02-19 VITALS
WEIGHT: 242 LBS | TEMPERATURE: 98.6 F | HEART RATE: 67 BPM | RESPIRATION RATE: 18 BRPM | DIASTOLIC BLOOD PRESSURE: 76 MMHG | HEIGHT: 69 IN | OXYGEN SATURATION: 96 % | SYSTOLIC BLOOD PRESSURE: 128 MMHG | BODY MASS INDEX: 35.84 KG/M2

## 2024-02-19 DIAGNOSIS — E66.01 MORBID OBESITY (H): ICD-10-CM

## 2024-02-19 DIAGNOSIS — F10.20 UNCOMPLICATED ALCOHOL DEPENDENCE (H): ICD-10-CM

## 2024-02-19 DIAGNOSIS — I10 BENIGN ESSENTIAL HYPERTENSION: ICD-10-CM

## 2024-02-19 DIAGNOSIS — Z87.39 HISTORY OF GOUT: ICD-10-CM

## 2024-02-19 DIAGNOSIS — Z01.818 PREOP GENERAL PHYSICAL EXAM: Primary | ICD-10-CM

## 2024-02-19 DIAGNOSIS — M17.12 PRIMARY OSTEOARTHRITIS OF LEFT KNEE: ICD-10-CM

## 2024-02-19 DIAGNOSIS — D64.9 ANEMIA, UNSPECIFIED TYPE: ICD-10-CM

## 2024-02-19 DIAGNOSIS — G47.33 OSA (OBSTRUCTIVE SLEEP APNEA): ICD-10-CM

## 2024-02-19 DIAGNOSIS — E11.9 TYPE 2 DIABETES MELLITUS WITHOUT COMPLICATION, WITHOUT LONG-TERM CURRENT USE OF INSULIN (H): ICD-10-CM

## 2024-02-19 DIAGNOSIS — F33.42 RECURRENT MAJOR DEPRESSIVE DISORDER, IN FULL REMISSION (H): ICD-10-CM

## 2024-02-19 LAB
ALBUMIN SERPL BCG-MCNC: 4.3 G/DL (ref 3.5–5.2)
ALP SERPL-CCNC: 93 U/L (ref 40–150)
ALT SERPL W P-5'-P-CCNC: 29 U/L (ref 0–70)
ANION GAP SERPL CALCULATED.3IONS-SCNC: 11 MMOL/L (ref 7–15)
AST SERPL W P-5'-P-CCNC: 28 U/L (ref 0–45)
BILIRUB SERPL-MCNC: 0.3 MG/DL
BUN SERPL-MCNC: 9.3 MG/DL (ref 8–23)
CALCIUM SERPL-MCNC: 9.6 MG/DL (ref 8.8–10.2)
CHLORIDE SERPL-SCNC: 102 MMOL/L (ref 98–107)
CREAT SERPL-MCNC: 0.66 MG/DL (ref 0.67–1.17)
DEPRECATED HCO3 PLAS-SCNC: 28 MMOL/L (ref 22–29)
EGFRCR SERPLBLD CKD-EPI 2021: >90 ML/MIN/1.73M2
ERYTHROCYTE [DISTWIDTH] IN BLOOD BY AUTOMATED COUNT: 14.8 % (ref 10–15)
GLUCOSE SERPL-MCNC: 150 MG/DL (ref 70–99)
HBA1C MFR BLD: 5.8 % (ref 0–5.6)
HCT VFR BLD AUTO: 39 % (ref 40–53)
HGB BLD-MCNC: 12 G/DL (ref 13.3–17.7)
MCH RBC QN AUTO: 28.8 PG (ref 26.5–33)
MCHC RBC AUTO-ENTMCNC: 30.8 G/DL (ref 31.5–36.5)
MCV RBC AUTO: 94 FL (ref 78–100)
PLATELET # BLD AUTO: 177 10E3/UL (ref 150–450)
POTASSIUM SERPL-SCNC: 4.3 MMOL/L (ref 3.4–5.3)
PROT SERPL-MCNC: 7.2 G/DL (ref 6.4–8.3)
RBC # BLD AUTO: 4.17 10E6/UL (ref 4.4–5.9)
SODIUM SERPL-SCNC: 141 MMOL/L (ref 135–145)
WBC # BLD AUTO: 5 10E3/UL (ref 4–11)

## 2024-02-19 PROCEDURE — 83036 HEMOGLOBIN GLYCOSYLATED A1C: CPT | Performed by: FAMILY MEDICINE

## 2024-02-19 PROCEDURE — 80053 COMPREHEN METABOLIC PANEL: CPT | Performed by: FAMILY MEDICINE

## 2024-02-19 PROCEDURE — 36415 COLL VENOUS BLD VENIPUNCTURE: CPT | Performed by: FAMILY MEDICINE

## 2024-02-19 PROCEDURE — 85027 COMPLETE CBC AUTOMATED: CPT | Performed by: FAMILY MEDICINE

## 2024-02-19 PROCEDURE — 99214 OFFICE O/P EST MOD 30 MIN: CPT | Performed by: FAMILY MEDICINE

## 2024-02-19 ASSESSMENT — PAIN SCALES - GENERAL: PAINLEVEL: MODERATE PAIN (4)

## 2024-02-19 ASSESSMENT — PATIENT HEALTH QUESTIONNAIRE - PHQ9: SUM OF ALL RESPONSES TO PHQ QUESTIONS 1-9: 0

## 2024-02-19 NOTE — PROGRESS NOTES
Preoperative Evaluation  [facility]  [address]  [facility] [address]  Phone: [phone]  Fax: [phone]  Primary Provider: Rashi Muro  Pre-op Performing Provider: RASHI MURO  Feb 19, 2024       Christopher is a 73 year old, presenting for the following:  Recheck Medication, Diabetes, and Pre-Op Exam        2/19/2024    11:14 AM   Additional Questions   Roomed by am cma     Do you have a current opioid prescription? no  Do you use any other controlled substances or medications that are not prescribed by a provider?  no  Surgical Information  Surgery/Procedure: left knee replacement  Surgery Location: [facility]  Surgeon: Dr Ajay Urbano  Surgery Date: 3/11/24  Time of Surgery: 12:45 pm   Where patient plans to recover: At home with family  Fax number for surgical facility: Note does not need to be faxed, will be available electronically in Epic.    Assessment & Plan     The proposed surgical procedure is considered INTERMEDIATE risk.    Christopher was seen today for recheck medication, diabetes and pre-op exam.    Diagnoses and all orders for this visit:    Preop general physical exam  -     Comprehensive metabolic panel (BMP + Alb, Alk Phos, ALT, AST, Total. Bili, TP); Future  -     Hemoglobin A1c; Future  -     CBC with platelets; Future  -     Comprehensive metabolic panel (BMP + Alb, Alk Phos, ALT, AST, Total. Bili, TP)  -     Hemoglobin A1c  -     CBC with platelets    Primary osteoarthritis of right knee    Type 2 diabetes mellitus without complication, without long-term current use of insulin (H)  -     Comprehensive metabolic panel (BMP + Alb, Alk Phos, ALT, AST, Total. Bili, TP); Future  -     Hemoglobin A1c; Future  -     Comprehensive metabolic panel (BMP + Alb, Alk Phos, ALT, AST, Total. Bili, TP)  -     Hemoglobin A1c    Benign essential hypertension    Recurrent major depressive disorder, in full remission (H24)    Uncomplicated alcohol dependence (H)    History  of gout    TERESA (obstructive sleep apnea)    Obesity (BMI 35.0-39.9) with comorbidity (H)          - No identified additional risk factors other than previously addressed    Antiplatelet or Anticoagulation Medication Instructions   - aspirin: Discontinue aspirin 7-10 days prior to procedure to reduce bleeding risk. It should be resumed postoperatively.     Additional Medication Instructions  Hold the following medications the day of the procedure: Metformin, losartan, fenofibrate    Take all other medications as scheduled up until the time of surgery taking into account the necessity to not eat or drink anything for the timeframe specified by the surgical team.    Recommendation  APPROVAL GIVEN to proceed with proposed procedure, without further diagnostic evaluation.      Subjective         HPI related to upcoming procedure:     He has severe left knee osteoarthritis and has exhausted conservative means for managing the condition.  He is now scheduled for total knee arthroplasty.  He has type 2 diabetes his A1c currently is 5.8.  Diabetes is treated with metformin 5 mg twice daily.    Blood pressure is well-controlled on current agents.  He is on a statin for vascular disease risk reduction.  He takes aspirin for vascular disease risk reduction.  He does not have any history of vascular disease.  He has normal renal function.    He has a history of gout with no recent gout attacks.  He remains on suppression therapy.    He has a history of obstructive sleep apnea he was placed on BiPAP but could not handle wearing the mask so he is currently untreated.    He reports no signs or symptoms of an acute illness.    He reports no history of any significant problems or complications related to anesthesia.    He underwent right knee arthroplasty without significant problems less than a year ago.    He does have a mild anemia which I would attribute to his knee arthroplasty done back in November.  Do not think that at the  current level this would cause any concern with the planned upcoming procedure.      Wt Readings from Last 3 Encounters:   02/19/24 109.8 kg (242 lb)   11/16/23 116.6 kg (257 lb)   11/03/23 116.6 kg (257 lb 1.6 oz)           2/12/2024    12:10 PM   Preop Questions   1. Have you ever had a heart attack or stroke? No   2. Have you ever had surgery on your heart or blood vessels, such as a stent placement, a coronary artery bypass, or surgery on an artery in your head, neck, heart, or legs? No   3. Do you have chest pain with activity? No   4. Do you have a history of  heart failure? No   5. Do you currently have a cold, bronchitis or symptoms of other infection? No   6. Do you have a cough, shortness of breath, or wheezing? No   7. Do you or anyone in your family have previous history of blood clots? No   8. Do you or does anyone in your family have a serious bleeding problem such as prolonged bleeding following surgeries or cuts? No   9. Have you ever had problems with anemia or been told to take iron pills? No   10. Have you had any abnormal blood loss such as black, tarry or bloody stools? No   11. Have you ever had a blood transfusion? No   12. Are you willing to have a blood transfusion if it is medically needed before, during, or after your surgery? Yes   13. Have you or any of your relatives ever had problems with anesthesia? No   14. Do you have sleep apnea, excessive snoring or daytime drowsiness? YES -    14a. Do you have a CPAP machine? No   15. Do you have any artifical heart valves or other implanted medical devices like a pacemaker, defibrillator, or continuous glucose monitor? No   16. Do you have artificial joints? YES -    17. Are you allergic to latex? No     Health Care Directive  Patient has a Health Care Directive on file    Preoperative Review of    reviewed - controlled substances reflected in medication list.    Status of Chronic Conditions:  See problem list for active medical problems.   Problems all longstanding and stable, except as noted/documented.  See ROS for pertinent symptoms related to these conditions.    Patient Active Problem List    Diagnosis Date Noted    Primary osteoarthritis of knee 11/16/2023     Priority: Medium    Alcohol dependence (H) 09/30/2022     Priority: Medium    Type 2 diabetes mellitus without complication, without long-term current use of insulin (H) 07/07/2020     Priority: Medium    Hx of gout 01/10/2019     Priority: Medium     Uric acid level 5.5 in 2017        Obesity hypoventilation syndrome (H) 01/08/2019     Priority: Medium    Restrictive lung disease 01/08/2019     Priority: Medium    Hearing problem of both ears 01/08/2019     Priority: Medium     Saw audiology 2017 - rec f/u 3 yrs        Multiple thyroid nodules 01/08/2019     Priority: Medium     Noted on thyroid US 2014 - 0.6 cm right nodule, 0.4 cm left nodule - rec   f/u 6-12 months        Abnormal liver ultrasound 01/08/2019     Priority: Medium     Changes c/w hepatocellular disease vs fatty liver        Hx of colonic polyps 01/08/2019     Priority: Medium     Colonoscopy 2016 - 2 hyperplastic polyps - likely f/u 5 yrs        Elevated triglycerides with high cholesterol 12/04/2018     Priority: Medium    Benign essential hypertension 12/04/2018     Priority: Medium     Coronary calcium score 133 in 2017        Other emphysema (H) 12/04/2018     Priority: Medium     [facility] with [facility]        Recurrent major depressive disorder, in full remission (H24) 12/04/2018     Priority: Medium    SVT (supraventricular tachycardia) 12/04/2018     Priority: Medium     Hx 2 episodes in past year, now on diltiazem for this        TERESA (obstructive sleep apnea) 12/04/2018     Priority: Medium     Has BIPAP machine        Bilateral primary osteoarthritis of knee 12/04/2018     Priority: Medium    Obesity (BMI 35.0-39.9) with comorbidity (H) 12/04/2018     Priority: Medium      Past Medical History:    Diagnosis Date    Arrhythmia     Arthritis     Benign essential hypertension 12/04/2018    Diabetes (H)     Elevated triglycerides with high cholesterol 12/04/2018    Obesity (BMI 35.0-39.9) with comorbidity (H) 12/04/2018    TERESA (obstructive sleep apnea) 12/04/2018    Has BIPAP machine    Other emphysema (H) 12/04/2018    Hx following with pulmonology, [facility]    Patellofemoral disorder of both knees 12/04/2018    Prediabetes 12/04/2018    Recurrent major depressive disorder, in full remission (H24) 12/04/2018    Spinal headache     SVT (supraventricular tachycardia) 12/04/2018    Hx 2 episodes in past year, now on diltiazem for this     Past Surgical History:   Procedure Laterality Date    ARTHROPLASTY KNEE Right 11/16/2023    Procedure: RIGHT TOTAL KNEE ARTHROPLASTY;  Surgeon: Ajay Pendleton DO;  Location: [facility] Main OR    HEMORRHOIDECTOMY EXTERNAL  2004    INCISION AND DRAINAGE PERIRECTAL ABSCESS       Current Outpatient Medications   Medication Sig Dispense Refill    allopurinol (ZYLOPRIM) 100 MG tablet TAKE ONE TABLET BY MOUTH EVERY DAY 90 tablet 1    aspirin 81 MG EC tablet Take 1 tablet (81 mg) by mouth 2 times daily Take for 1 month for blood clot prevention (until 12/16/23) 60 tablet 0    atorvastatin (LIPITOR) 20 MG tablet TAKE ONE TABLET BY MOUTH EVERY DAY AT BEDTIME 90 tablet 3    blood glucose (NO BRAND SPECIFIED) lancets standard Use to test blood sugar 1 times daily or as directed. 100 each 1    blood glucose (NO BRAND SPECIFIED) test strip Use to test blood sugar 1 times daily or as directed. 100 strip 1    blood glucose (ONE TOUCH DELICA) lancing device Lancing device to be used with lancets. 1 each 0    blood glucose monitoring (ONETOUCH VERIO) meter device kit Use to test blood sugar 1 times daily or as directed. 1 kit 0    buPROPion (WELLBUTRIN XL) 150 MG 24 hr tablet TAKE ONE TABLET BY MOUTH EVERY DAY 90 tablet 1    cefadroxil (DURICEF) 500 MG capsule take one capsule by mouth twice a  day for 7 days      diltiazem ER COATED BEADS (CARDIZEM CD/CARTIA XT) 180 MG 24 hr capsule TAKE ONE CAPSULE BY MOUTH EVERY DAY 90 capsule 3    fenofibrate (TRICOR) 48 MG tablet Take 1 tablet (48 mg) by mouth daily 90 tablet 3    FLUoxetine (PROZAC) 20 MG capsule TAKE ONE CAPSULE BY MOUTH EVERY DAY 90 capsule 1    hydrOXYzine (ATARAX) 10 MG tablet Take 10 mg by mouth 4 times daily as needed      Lancets (ONETOUCH DELICA PLUS [id number]) MISC Inject 1 Lancet Subcutaneous daily      losartan (COZAAR) 50 MG tablet TAKE ONE TABLET BY MOUTH EVERY DAY 90 tablet 0    metFORMIN (GLUCOPHAGE) 500 MG tablet Take 1 tablet (500 mg) by mouth 2 times daily (with meals) 180 tablet 1    naproxen sodium (ANAPROX) 220 MG tablet Take 220 mg by mouth daily      OLANZapine (ZYPREXA) 5 MG tablet TAKE TWO TABLETS BY MOUTH EVERY  tablet 1    ondansetron (ZOFRAN ODT) 4 MG ODT tab Take 4 mg by mouth every 8 hours as needed for nausea      senna-docusate (SENOKOT-S/PERICOLACE) 8.6-50 MG tablet Take 1-2 tablets by mouth 2 times daily Take while on oral narcotics to prevent or treat constipation. 60 tablet 1    oxyCODONE (ROXICODONE) 5 MG tablet Take 5-10 mg by mouth every 4 hours as needed for pain (Patient not taking: Reported on 2/19/2024)       No Known Allergies     Social History     Tobacco Use    Smoking status: Some Days     Packs/day: 1.00     Years: 40.00     Additional pack years: 0.00     Total pack years: 40.00     Types: Cigars, Cigarettes     Last attempt to quit: 1/1/2014     Years since quitting: 10.1    Smokeless tobacco: Never    Tobacco comments:     Rare cigar and E cig use   Substance Use Topics    Alcohol use: Yes     Alcohol/week: 56.0 standard drinks of alcohol     Types: 56 Glasses of wine per week     Comment: 21-28 per week, will cut back prior to surgery     Family History   Problem Relation Age of Onset    Pancreatic Cancer Mother 62.00    Depression Mother     Cancer Father 88.00        gallbladder     "Diabetes Sister     Depression Sister     No Known Problems Brother     Heart Failure Maternal Grandmother          early 70s    Cerebrovascular Disease Maternal Grandfather          in 80s    Heart Failure Paternal Grandmother          in 90s    Coronary Artery Disease Paternal Grandfather         late 60s    Heart Failure Paternal Grandfather     No Known Problems Brother      History   Drug Use No         Review of Systems  Complete review of systems is obtained.  Other than the specific considerations noted above complete review of systems is negative.      Objective    /76   Pulse 67   Temp 98.6  F (37  C)   Resp 18   Ht 1.753 m (5' 9\")   Wt 109.8 kg (242 lb)   SpO2 96%   BMI 35.74 kg/m     Estimated body mass index is 35.74 kg/m  as calculated from the following:    Height as of this encounter: 1.753 m (5' 9\").    Weight as of this encounter: 109.8 kg (242 lb).    Wt Readings from Last 3 Encounters:   24 109.8 kg (242 lb)   23 116.6 kg (257 lb)   23 116.6 kg (257 lb 1.6 oz)     Physical Exam  General Appearance:    Alert, cooperative, no distress   Eyes:   No scleral icterus or conjunctival irritation       Ears:    Normal TM's and external ear canals, both ears   Throat:   Lips, mucosa, and tongue normal; teeth and gums normal   Neck:   Supple, symmetrical, trachea midline, no adenopathy;        thyroid:  No enlargement/tenderness/nodules   Lungs:     Clear to auscultation bilaterally, respirations unlabored, no wheezes or crackles   Heart:    Regular rate and rhythm,  No murmur   Abdomen:    Soft, no distention, no tenderness on palpation, no masses, no organomegaly     Extremities:  No edema, no joint swelling or redness, no evidence of any injuries   Skin:  No concerning skin findings, no suspicious moles, no rashes   Neurologic:  On gross examination there is no motor or sensory deficit.  Patient walks with a normal gait       Recent Labs   Lab Test " 11/17/23  0526 11/17/23  0525 11/03/23  0856 10/02/23  1213 09/11/23  1324 03/02/23  0942   HGB 13.3  --  14.8   < > 14.9 14.4     --  161   < > 169 181   NA  --  139 142   < > 142 140   POTASSIUM  --  4.4 4.4   < > 4.4 4.2   CR  --  0.69 0.68   < > 0.77 0.75   A1C  --   --   --   --  6.8* 6.5*    < > = values in this interval not displayed.        Diagnostics  Labs pending at this time.  Results will be reviewed when available.   No EKG required, no history of coronary heart disease, significant arrhythmia, peripheral arterial disease or other structural heart disease.    Normal EKG was obtained on October 2, 2023 do not feel any additional cardiac testing is necessary given the absence of specific heart disease.    Revised Cardiac Risk Index (RCRI)  The patient has the following serious cardiovascular risks for perioperative complications:   - No serious cardiac risks = 0 points     RCRI Interpretation: 0 points: Class I (very low risk - 0.4% complication rate)       Signed Electronically by: Rashi Nichols MD, MD  Copy of this evaluation report is provided to requesting physician.

## 2024-02-19 NOTE — H&P (VIEW-ONLY)
Preoperative Evaluation  [facility]  [address]  [place] [address]  Phone: [phone]  Fax: [phone]  Primary Provider: Rashi Muro  Pre-op Performing Provider: RASHI MURO  Feb 19, 2024       Christopher is a 73 year old, presenting for the following:  Recheck Medication, Diabetes, and Pre-Op Exam        2/19/2024    11:14 AM   Additional Questions   Roomed by am cma     Do you have a current opioid prescription? no  Do you use any other controlled substances or medications that are not prescribed by a provider?  no  Surgical Information  Surgery/Procedure: left knee replacement  Surgery Location: [facility]  Surgeon: Dr Ajay Urbano  Surgery Date: 3/11/24  Time of Surgery: 12:45 pm   Where patient plans to recover: At home with family  Fax number for surgical facility: Note does not need to be faxed, will be available electronically in Epic.    Assessment & Plan     The proposed surgical procedure is considered INTERMEDIATE risk.    Christopher was seen today for recheck medication, diabetes and pre-op exam.    Diagnoses and all orders for this visit:    Preop general physical exam  -     Comprehensive metabolic panel (BMP + Alb, Alk Phos, ALT, AST, Total. Bili, TP); Future  -     Hemoglobin A1c; Future  -     CBC with platelets; Future  -     Comprehensive metabolic panel (BMP + Alb, Alk Phos, ALT, AST, Total. Bili, TP)  -     Hemoglobin A1c  -     CBC with platelets    Primary osteoarthritis of right knee    Type 2 diabetes mellitus without complication, without long-term current use of insulin (H)  -     Comprehensive metabolic panel (BMP + Alb, Alk Phos, ALT, AST, Total. Bili, TP); Future  -     Hemoglobin A1c; Future  -     Comprehensive metabolic panel (BMP + Alb, Alk Phos, ALT, AST, Total. Bili, TP)  -     Hemoglobin A1c    Benign essential hypertension    Recurrent major depressive disorder, in full remission (H24)    Uncomplicated alcohol dependence (H)    History  of gout    TERESA (obstructive sleep apnea)    Obesity (BMI 35.0-39.9) with comorbidity (H)          - No identified additional risk factors other than previously addressed    Antiplatelet or Anticoagulation Medication Instructions   - aspirin: Discontinue aspirin 7-10 days prior to procedure to reduce bleeding risk. It should be resumed postoperatively.     Additional Medication Instructions  Hold the following medications the day of the procedure: Metformin, losartan, fenofibrate    Take all other medications as scheduled up until the time of surgery taking into account the necessity to not eat or drink anything for the timeframe specified by the surgical team.    Recommendation  APPROVAL GIVEN to proceed with proposed procedure, without further diagnostic evaluation.      Subjective         HPI related to upcoming procedure:     He has severe left knee osteoarthritis and has exhausted conservative means for managing the condition.  He is now scheduled for total knee arthroplasty.  He has type 2 diabetes his A1c currently is 5.8.  Diabetes is treated with metformin 5 mg twice daily.    Blood pressure is well-controlled on current agents.  He is on a statin for vascular disease risk reduction.  He takes aspirin for vascular disease risk reduction.  He does not have any history of vascular disease.  He has normal renal function.    He has a history of gout with no recent gout attacks.  He remains on suppression therapy.    He has a history of obstructive sleep apnea he was placed on BiPAP but could not handle wearing the mask so he is currently untreated.    He reports no signs or symptoms of an acute illness.    He reports no history of any significant problems or complications related to anesthesia.    He underwent right knee arthroplasty without significant problems less than a year ago.    He does have a mild anemia which I would attribute to his knee arthroplasty done back in November.  Do not think that at the  current level this would cause any concern with the planned upcoming procedure.      Wt Readings from Last 3 Encounters:   02/19/24 109.8 kg (242 lb)   11/16/23 116.6 kg (257 lb)   11/03/23 116.6 kg (257 lb 1.6 oz)           2/12/2024    12:10 PM   Preop Questions   1. Have you ever had a heart attack or stroke? No   2. Have you ever had surgery on your heart or blood vessels, such as a stent placement, a coronary artery bypass, or surgery on an artery in your head, neck, heart, or legs? No   3. Do you have chest pain with activity? No   4. Do you have a history of  heart failure? No   5. Do you currently have a cold, bronchitis or symptoms of other infection? No   6. Do you have a cough, shortness of breath, or wheezing? No   7. Do you or anyone in your family have previous history of blood clots? No   8. Do you or does anyone in your family have a serious bleeding problem such as prolonged bleeding following surgeries or cuts? No   9. Have you ever had problems with anemia or been told to take iron pills? No   10. Have you had any abnormal blood loss such as black, tarry or bloody stools? No   11. Have you ever had a blood transfusion? No   12. Are you willing to have a blood transfusion if it is medically needed before, during, or after your surgery? Yes   13. Have you or any of your relatives ever had problems with anesthesia? No   14. Do you have sleep apnea, excessive snoring or daytime drowsiness? YES -    14a. Do you have a CPAP machine? No   15. Do you have any artifical heart valves or other implanted medical devices like a pacemaker, defibrillator, or continuous glucose monitor? No   16. Do you have artificial joints? YES -    17. Are you allergic to latex? No     Health Care Directive  Patient has a Health Care Directive on file    Preoperative Review of    reviewed - controlled substances reflected in medication list.    Status of Chronic Conditions:  See problem list for active medical problems.   Problems all longstanding and stable, except as noted/documented.  See ROS for pertinent symptoms related to these conditions.    Patient Active Problem List    Diagnosis Date Noted    Primary osteoarthritis of knee 11/16/2023     Priority: Medium    Alcohol dependence (H) 09/30/2022     Priority: Medium    Type 2 diabetes mellitus without complication, without long-term current use of insulin (H) 07/07/2020     Priority: Medium    Hx of gout 01/10/2019     Priority: Medium     Uric acid level 5.5 in 2017        Obesity hypoventilation syndrome (H) 01/08/2019     Priority: Medium    Restrictive lung disease 01/08/2019     Priority: Medium    Hearing problem of both ears 01/08/2019     Priority: Medium     Saw audiology 2017 - rec f/u 3 yrs        Multiple thyroid nodules 01/08/2019     Priority: Medium     Noted on thyroid US 2014 - 0.6 cm right nodule, 0.4 cm left nodule - rec   f/u 6-12 months        Abnormal liver ultrasound 01/08/2019     Priority: Medium     Changes c/w hepatocellular disease vs fatty liver        Hx of colonic polyps 01/08/2019     Priority: Medium     Colonoscopy 2016 - 2 hyperplastic polyps - likely f/u 5 yrs        Elevated triglycerides with high cholesterol 12/04/2018     Priority: Medium    Benign essential hypertension 12/04/2018     Priority: Medium     Coronary calcium score 133 in 2017        Other emphysema (H) 12/04/2018     Priority: Medium     Hx following with [facility]ology[facility]        Recurrent major depressive disorder, in full remission (H24) 12/04/2018     Priority: Medium    SVT (supraventricular tachycardia) 12/04/2018     Priority: Medium     Hx 2 episodes in past year, now on diltiazem for this        TERESA (obstructive sleep apnea) 12/04/2018     Priority: Medium     Has BIPAP machine        Bilateral primary osteoarthritis of knee 12/04/2018     Priority: Medium    Obesity (BMI 35.0-39.9) with comorbidity (H) 12/04/2018     Priority: Medium      Past Medical History:    Diagnosis Date    Arrhythmia     Arthritis     Benign essential hypertension 12/04/2018    Diabetes (H)     Elevated triglycerides with high cholesterol 12/04/2018    Obesity (BMI 35.0-39.9) with comorbidity (H) 12/04/2018    TERESA (obstructive sleep apnea) 12/04/2018    Has BIPAP machine    Other emphysema (H) 12/04/2018    Hx following with pulmonology, [facility]    Patellofemoral disorder of both knees 12/04/2018    Prediabetes 12/04/2018    Recurrent major depressive disorder, in full remission (H24) 12/04/2018    Spinal headache     SVT (supraventricular tachycardia) 12/04/2018    Hx 2 episodes in past year, now on diltiazem for this     Past Surgical History:   Procedure Laterality Date    ARTHROPLASTY KNEE Right 11/16/2023    Procedure: RIGHT TOTAL KNEE ARTHROPLASTY;  Surgeon: Ajay Pendleton DO;  Location: [facility] Main OR    HEMORRHOIDECTOMY EXTERNAL  2004    INCISION AND DRAINAGE PERIRECTAL ABSCESS       Current Outpatient Medications   Medication Sig Dispense Refill    allopurinol (ZYLOPRIM) 100 MG tablet TAKE ONE TABLET BY MOUTH EVERY DAY 90 tablet 1    aspirin 81 MG EC tablet Take 1 tablet (81 mg) by mouth 2 times daily Take for 1 month for blood clot prevention (until 12/16/23) 60 tablet 0    atorvastatin (LIPITOR) 20 MG tablet TAKE ONE TABLET BY MOUTH EVERY DAY AT BEDTIME 90 tablet 3    blood glucose (NO BRAND SPECIFIED) lancets standard Use to test blood sugar 1 times daily or as directed. 100 each 1    blood glucose (NO BRAND SPECIFIED) test strip Use to test blood sugar 1 times daily or as directed. 100 strip 1    blood glucose (ONE TOUCH DELICA) lancing device Lancing device to be used with lancets. 1 each 0    blood glucose monitoring (ONETOUCH VERIO) meter device kit Use to test blood sugar 1 times daily or as directed. 1 kit 0    buPROPion (WELLBUTRIN XL) 150 MG 24 hr tablet TAKE ONE TABLET BY MOUTH EVERY DAY 90 tablet 1    cefadroxil (DURICEF) 500 MG capsule take one capsule by mouth twice a  day for 7 days      diltiazem ER COATED BEADS (CARDIZEM CD/CARTIA XT) 180 MG 24 hr capsule TAKE ONE CAPSULE BY MOUTH EVERY DAY 90 capsule 3    fenofibrate (TRICOR) 48 MG tablet Take 1 tablet (48 mg) by mouth daily 90 tablet 3    FLUoxetine (PROZAC) 20 MG capsule TAKE ONE CAPSULE BY MOUTH EVERY DAY 90 capsule 1    hydrOXYzine (ATARAX) 10 MG tablet Take 10 mg by mouth 4 times daily as needed      Lancets (ONETOUCH DELICA PLUS [id number]) MISC Inject 1 Lancet Subcutaneous daily      losartan (COZAAR) 50 MG tablet TAKE ONE TABLET BY MOUTH EVERY DAY 90 tablet 0    metFORMIN (GLUCOPHAGE) 500 MG tablet Take 1 tablet (500 mg) by mouth 2 times daily (with meals) 180 tablet 1    naproxen sodium (ANAPROX) 220 MG tablet Take 220 mg by mouth daily      OLANZapine (ZYPREXA) 5 MG tablet TAKE TWO TABLETS BY MOUTH EVERY  tablet 1    ondansetron (ZOFRAN ODT) 4 MG ODT tab Take 4 mg by mouth every 8 hours as needed for nausea      senna-docusate (SENOKOT-S/PERICOLACE) 8.6-50 MG tablet Take 1-2 tablets by mouth 2 times daily Take while on oral narcotics to prevent or treat constipation. 60 tablet 1    oxyCODONE (ROXICODONE) 5 MG tablet Take 5-10 mg by mouth every 4 hours as needed for pain (Patient not taking: Reported on 2/19/2024)       No Known Allergies     Social History     Tobacco Use    Smoking status: Some Days     Packs/day: 1.00     Years: 40.00     Additional pack years: 0.00     Total pack years: 40.00     Types: Cigars, Cigarettes     Last attempt to quit: 1/1/2014     Years since quitting: 10.1    Smokeless tobacco: Never    Tobacco comments:     Rare cigar and E cig use   Substance Use Topics    Alcohol use: Yes     Alcohol/week: 56.0 standard drinks of alcohol     Types: 56 Glasses of wine per week     Comment: 21-28 per week, will cut back prior to surgery     Family History   Problem Relation Age of Onset    Pancreatic Cancer Mother 62.00    Depression Mother     Cancer Father 88.00        gallbladder     "Diabetes Sister     Depression Sister     No Known Problems Brother     Heart Failure Maternal Grandmother          early 70s    Cerebrovascular Disease Maternal Grandfather          in 80s    Heart Failure Paternal Grandmother          in 90s    Coronary Artery Disease Paternal Grandfather         late 60s    Heart Failure Paternal Grandfather     No Known Problems Brother      History   Drug Use No         Review of Systems  Complete review of systems is obtained.  Other than the specific considerations noted above complete review of systems is negative.      Objective    /76   Pulse 67   Temp 98.6  F (37  C)   Resp 18   Ht 1.753 m (5' 9\")   Wt 109.8 kg (242 lb)   SpO2 96%   BMI 35.74 kg/m     Estimated body mass index is 35.74 kg/m  as calculated from the following:    Height as of this encounter: 1.753 m (5' 9\").    Weight as of this encounter: 109.8 kg (242 lb).    Wt Readings from Last 3 Encounters:   24 109.8 kg (242 lb)   23 116.6 kg (257 lb)   23 116.6 kg (257 lb 1.6 oz)     Physical Exam  General Appearance:    Alert, cooperative, no distress   Eyes:   No scleral icterus or conjunctival irritation       Ears:    Normal TM's and external ear canals, both ears   Throat:   Lips, mucosa, and tongue normal; teeth and gums normal   Neck:   Supple, symmetrical, trachea midline, no adenopathy;        thyroid:  No enlargement/tenderness/nodules   Lungs:     Clear to auscultation bilaterally, respirations unlabored, no wheezes or crackles   Heart:    Regular rate and rhythm,  No murmur   Abdomen:    Soft, no distention, no tenderness on palpation, no masses, no organomegaly     Extremities:  No edema, no joint swelling or redness, no evidence of any injuries   Skin:  No concerning skin findings, no suspicious moles, no rashes   Neurologic:  On gross examination there is no motor or sensory deficit.  Patient walks with a normal gait       Recent Labs   Lab Test " 11/17/23  0526 11/17/23  0525 11/03/23  0856 10/02/23  1213 09/11/23  1324 03/02/23  0942   HGB 13.3  --  14.8   < > 14.9 14.4     --  161   < > 169 181   NA  --  139 142   < > 142 140   POTASSIUM  --  4.4 4.4   < > 4.4 4.2   CR  --  0.69 0.68   < > 0.77 0.75   A1C  --   --   --   --  6.8* 6.5*    < > = values in this interval not displayed.        Diagnostics  Labs pending at this time.  Results will be reviewed when available.   No EKG required, no history of coronary heart disease, significant arrhythmia, peripheral arterial disease or other structural heart disease.    Normal EKG was obtained on October 2, 2023 do not feel any additional cardiac testing is necessary given the absence of specific heart disease.    Revised Cardiac Risk Index (RCRI)  The patient has the following serious cardiovascular risks for perioperative complications:   - No serious cardiac risks = 0 points     RCRI Interpretation: 0 points: Class I (very low risk - 0.4% complication rate)       Signed Electronically by: Rashi Nichols MD, MD  Copy of this evaluation report is provided to requesting physician.

## 2024-02-29 DIAGNOSIS — I10 BENIGN ESSENTIAL HYPERTENSION: ICD-10-CM

## 2024-02-29 DIAGNOSIS — M1A.00X0 IDIOPATHIC CHRONIC GOUT WITHOUT TOPHUS, UNSPECIFIED SITE: ICD-10-CM

## 2024-02-29 DIAGNOSIS — I47.10 SVT (SUPRAVENTRICULAR TACHYCARDIA) (H): ICD-10-CM

## 2024-03-01 RX ORDER — DILTIAZEM HYDROCHLORIDE 180 MG/1
CAPSULE, COATED, EXTENDED RELEASE ORAL
Qty: 90 CAPSULE | Refills: 3 | Status: SHIPPED | OUTPATIENT
Start: 2024-03-01

## 2024-03-01 RX ORDER — ALLOPURINOL 100 MG/1
TABLET ORAL
Qty: 90 TABLET | Refills: 1 | Status: SHIPPED | OUTPATIENT
Start: 2024-03-01 | End: 2024-09-03

## 2024-03-08 DIAGNOSIS — F33.42 RECURRENT MAJOR DEPRESSIVE DISORDER, IN FULL REMISSION (H): ICD-10-CM

## 2024-03-08 RX ORDER — BUPROPION HYDROCHLORIDE 150 MG/1
TABLET ORAL
Qty: 90 TABLET | Refills: 0 | Status: SHIPPED | OUTPATIENT
Start: 2024-03-08 | End: 2024-06-07

## 2024-03-11 ENCOUNTER — ANESTHESIA (OUTPATIENT)
Dept: SURGERY | Facility: CLINIC | Age: 74
End: 2024-03-11
Payer: MEDICARE

## 2024-03-11 ENCOUNTER — HOSPITAL ENCOUNTER (OUTPATIENT)
Facility: CLINIC | Age: 74
Discharge: HOME | End: 2024-03-12
Attending: ORTHOPAEDIC SURGERY | Admitting: ORTHOPAEDIC SURGERY
Payer: MEDICARE

## 2024-03-11 ENCOUNTER — APPOINTMENT (OUTPATIENT)
Dept: RADIOLOGY | Facility: CLINIC | Age: 74
End: 2024-03-11
Attending: PHYSICIAN ASSISTANT
Payer: MEDICARE

## 2024-03-11 ENCOUNTER — ANESTHESIA EVENT (OUTPATIENT)
Dept: SURGERY | Facility: CLINIC | Age: 74
End: 2024-03-11
Payer: MEDICARE

## 2024-03-11 DIAGNOSIS — M17.12 PRIMARY OSTEOARTHRITIS OF LEFT KNEE: Primary | ICD-10-CM

## 2024-03-11 PROBLEM — M17.10 PRIMARY OSTEOARTHRITIS OF KNEE: Status: ACTIVE | Noted: 2023-11-16

## 2024-03-11 LAB
GLUCOSE BLDC GLUCOMTR-MCNC: 111 MG/DL (ref 70–99)
GLUCOSE BLDC GLUCOMTR-MCNC: 115 MG/DL (ref 70–99)
GLUCOSE BLDC GLUCOMTR-MCNC: 148 MG/DL (ref 70–99)
GLUCOSE BLDC GLUCOMTR-MCNC: 213 MG/DL (ref 70–99)

## 2024-03-11 PROCEDURE — 99222 1ST HOSP IP/OBS MODERATE 55: CPT | Performed by: FAMILY MEDICINE

## 2024-03-11 PROCEDURE — 258N000001 HC RX 258: Performed by: ORTHOPAEDIC SURGERY

## 2024-03-11 PROCEDURE — 250N000012 HC RX MED GY IP 250 OP 636 PS 637: Performed by: FAMILY MEDICINE

## 2024-03-11 PROCEDURE — 999N000141 HC STATISTIC PRE-PROCEDURE NURSING ASSESSMENT: Performed by: ORTHOPAEDIC SURGERY

## 2024-03-11 PROCEDURE — 999N000065 XR KNEE PORT LEFT 1/2 VIEWS: Mod: LT

## 2024-03-11 PROCEDURE — 258N000003 HC RX IP 258 OP 636: Performed by: ANESTHESIOLOGY

## 2024-03-11 PROCEDURE — 250N000013 HC RX MED GY IP 250 OP 250 PS 637: Performed by: FAMILY MEDICINE

## 2024-03-11 PROCEDURE — 82962 GLUCOSE BLOOD TEST: CPT

## 2024-03-11 PROCEDURE — 370N000017 HC ANESTHESIA TECHNICAL FEE, PER MIN: Performed by: ORTHOPAEDIC SURGERY

## 2024-03-11 PROCEDURE — 250N000011 HC RX IP 250 OP 636: Performed by: NURSE ANESTHETIST, CERTIFIED REGISTERED

## 2024-03-11 PROCEDURE — 250N000013 HC RX MED GY IP 250 OP 250 PS 637: Performed by: PHYSICIAN ASSISTANT

## 2024-03-11 PROCEDURE — 710N000010 HC RECOVERY PHASE 1, LEVEL 2, PER MIN: Performed by: ORTHOPAEDIC SURGERY

## 2024-03-11 PROCEDURE — 360N000077 HC SURGERY LEVEL 4, PER MIN: Performed by: ORTHOPAEDIC SURGERY

## 2024-03-11 PROCEDURE — 250N000009 HC RX 250: Performed by: ORTHOPAEDIC SURGERY

## 2024-03-11 PROCEDURE — 272N000002 HC OR SUPPLY OTHER OPNP: Performed by: ORTHOPAEDIC SURGERY

## 2024-03-11 PROCEDURE — 250N000011 HC RX IP 250 OP 636: Performed by: PHYSICIAN ASSISTANT

## 2024-03-11 PROCEDURE — 250N000011 HC RX IP 250 OP 636: Performed by: ANESTHESIOLOGY

## 2024-03-11 PROCEDURE — 258N000003 HC RX IP 258 OP 636: Performed by: NURSE ANESTHETIST, CERTIFIED REGISTERED

## 2024-03-11 PROCEDURE — C1776 JOINT DEVICE (IMPLANTABLE): HCPCS | Performed by: ORTHOPAEDIC SURGERY

## 2024-03-11 PROCEDURE — 250N000009 HC RX 250: Performed by: PHYSICIAN ASSISTANT

## 2024-03-11 PROCEDURE — C1713 ANCHOR/SCREW BN/BN,TIS/BN: HCPCS | Performed by: ORTHOPAEDIC SURGERY

## 2024-03-11 PROCEDURE — 272N000001 HC OR GENERAL SUPPLY STERILE: Performed by: ORTHOPAEDIC SURGERY

## 2024-03-11 DEVICE — PATELLA
Type: IMPLANTABLE DEVICE | Site: KNEE | Status: FUNCTIONAL
Brand: TRIATHLON

## 2024-03-11 DEVICE — UNIVERSAL TIBIAL BASEPLATE
Type: IMPLANTABLE DEVICE | Site: KNEE | Status: FUNCTIONAL
Brand: TRIATHLON

## 2024-03-11 DEVICE — SIMPLEX® HV WITH GENTAMICIN IS A FAST-SETTING ACRYLIC RESIN WITH ADDITION OF GENTAMICIN SULFATE FOR USE IN BONE SURGERY. MIXING THE TWO SEPARATE STERILE COMPONENTS PRODUCES A DUCTILE BONE CEMENT WHICH, AFTER HARDENING, FIXES THE IMPLANT AND TRANSFERS STRESSES PRODUCED DURING MOVEMENT EVENLY TO THE BONE. SIMPLEX® HV WITH GENTAMICINN CEMENT POWDER ALSO CONTAINS INSOLUBLE ZIRCONIUM DIOXIDE AS AN X-RAY CONTRAST MEDIUM. SIMPLEX® HV WITH GENTAMICIN DOES NOT EMIT A SIGNAL AND DOES NOT POSE A SAFETY RISK IN A MAGNETIC RESONANCE ENVIRONMENT.
Type: IMPLANTABLE DEVICE | Site: KNEE | Status: FUNCTIONAL
Brand: SIMPLEX HV WITH GENTAMICIN

## 2024-03-11 DEVICE — TIBIAL BEARING INSERT
Type: IMPLANTABLE DEVICE | Site: KNEE | Status: FUNCTIONAL
Brand: TRIATHLON

## 2024-03-11 DEVICE — CRUCIATE RETAINING FEMORAL
Type: IMPLANTABLE DEVICE | Site: KNEE | Status: FUNCTIONAL
Brand: TRIATHLON

## 2024-03-11 RX ORDER — OXYCODONE HYDROCHLORIDE 5 MG/1
5-10 TABLET ORAL EVERY 4 HOURS PRN
Qty: 26 TABLET | Refills: 0 | Status: SHIPPED | OUTPATIENT
Start: 2024-03-11 | End: 2024-06-11

## 2024-03-11 RX ORDER — LIDOCAINE 40 MG/G
CREAM TOPICAL
Status: DISCONTINUED | OUTPATIENT
Start: 2024-03-11 | End: 2024-03-11 | Stop reason: HOSPADM

## 2024-03-11 RX ORDER — PROPOFOL 10 MG/ML
INJECTION, EMULSION INTRAVENOUS CONTINUOUS PRN
Status: DISCONTINUED | OUTPATIENT
Start: 2024-03-11 | End: 2024-03-11

## 2024-03-11 RX ORDER — MAGNESIUM HYDROXIDE 1200 MG/15ML
LIQUID ORAL PRN
Status: DISCONTINUED | OUTPATIENT
Start: 2024-03-11 | End: 2024-03-11 | Stop reason: HOSPADM

## 2024-03-11 RX ORDER — DILTIAZEM HYDROCHLORIDE 180 MG/1
180 CAPSULE, COATED, EXTENDED RELEASE ORAL DAILY
Status: DISCONTINUED | OUTPATIENT
Start: 2024-03-12 | End: 2024-03-12 | Stop reason: HOSPADM

## 2024-03-11 RX ORDER — ASPIRIN 81 MG/1
81 TABLET ORAL 2 TIMES DAILY
Status: DISCONTINUED | OUTPATIENT
Start: 2024-03-11 | End: 2024-03-12 | Stop reason: HOSPADM

## 2024-03-11 RX ORDER — HYDROXYZINE HYDROCHLORIDE 10 MG/1
10 TABLET, FILM COATED ORAL EVERY 6 HOURS PRN
Status: DISCONTINUED | OUTPATIENT
Start: 2024-03-11 | End: 2024-03-12 | Stop reason: HOSPADM

## 2024-03-11 RX ORDER — CEFAZOLIN SODIUM/WATER 2 G/20 ML
2 SYRINGE (ML) INTRAVENOUS SEE ADMIN INSTRUCTIONS
Status: DISCONTINUED | OUTPATIENT
Start: 2024-03-11 | End: 2024-03-11 | Stop reason: HOSPADM

## 2024-03-11 RX ORDER — NALOXONE HYDROCHLORIDE 0.4 MG/ML
0.4 INJECTION, SOLUTION INTRAMUSCULAR; INTRAVENOUS; SUBCUTANEOUS
Status: DISCONTINUED | OUTPATIENT
Start: 2024-03-11 | End: 2024-03-12 | Stop reason: HOSPADM

## 2024-03-11 RX ORDER — ONDANSETRON 4 MG/1
4 TABLET, ORALLY DISINTEGRATING ORAL EVERY 30 MIN PRN
Status: DISCONTINUED | OUTPATIENT
Start: 2024-03-11 | End: 2024-03-11 | Stop reason: HOSPADM

## 2024-03-11 RX ORDER — BUPIVACAINE HYDROCHLORIDE 5 MG/ML
INJECTION, SOLUTION EPIDURAL; INTRACAUDAL PRN
Status: DISCONTINUED | OUTPATIENT
Start: 2024-03-11 | End: 2024-03-11

## 2024-03-11 RX ORDER — PROCHLORPERAZINE MALEATE 5 MG
5 TABLET ORAL EVERY 6 HOURS PRN
Status: DISCONTINUED | OUTPATIENT
Start: 2024-03-11 | End: 2024-03-12 | Stop reason: HOSPADM

## 2024-03-11 RX ORDER — FENOFIBRATE 48 MG/1
48 TABLET, COATED ORAL AT BEDTIME
Status: DISCONTINUED | OUTPATIENT
Start: 2024-03-11 | End: 2024-03-12 | Stop reason: HOSPADM

## 2024-03-11 RX ORDER — NALOXONE HYDROCHLORIDE 0.4 MG/ML
0.2 INJECTION, SOLUTION INTRAMUSCULAR; INTRAVENOUS; SUBCUTANEOUS
Status: DISCONTINUED | OUTPATIENT
Start: 2024-03-11 | End: 2024-03-12 | Stop reason: HOSPADM

## 2024-03-11 RX ORDER — TRANEXAMIC ACID 650 MG/1
1950 TABLET ORAL ONCE
Status: COMPLETED | OUTPATIENT
Start: 2024-03-11 | End: 2024-03-11

## 2024-03-11 RX ORDER — ACETAMINOPHEN 325 MG/1
650 TABLET ORAL EVERY 4 HOURS PRN
Qty: 100 TABLET | Refills: 0 | Status: SHIPPED | OUTPATIENT
Start: 2024-03-11

## 2024-03-11 RX ORDER — HYDROMORPHONE HCL IN WATER/PF 6 MG/30 ML
0.4 PATIENT CONTROLLED ANALGESIA SYRINGE INTRAVENOUS
Status: DISCONTINUED | OUTPATIENT
Start: 2024-03-11 | End: 2024-03-12 | Stop reason: HOSPADM

## 2024-03-11 RX ORDER — ATORVASTATIN CALCIUM 10 MG/1
20 TABLET, FILM COATED ORAL EVERY EVENING
Status: DISCONTINUED | OUTPATIENT
Start: 2024-03-11 | End: 2024-03-12 | Stop reason: HOSPADM

## 2024-03-11 RX ORDER — LIDOCAINE 40 MG/G
CREAM TOPICAL
Status: DISCONTINUED | OUTPATIENT
Start: 2024-03-11 | End: 2024-03-12 | Stop reason: HOSPADM

## 2024-03-11 RX ORDER — FENTANYL CITRATE 50 UG/ML
25 INJECTION, SOLUTION INTRAMUSCULAR; INTRAVENOUS EVERY 5 MIN PRN
Status: DISCONTINUED | OUTPATIENT
Start: 2024-03-11 | End: 2024-03-11 | Stop reason: HOSPADM

## 2024-03-11 RX ORDER — OLANZAPINE 5 MG/1
10 TABLET ORAL DAILY
Status: DISCONTINUED | OUTPATIENT
Start: 2024-03-12 | End: 2024-03-12 | Stop reason: HOSPADM

## 2024-03-11 RX ORDER — FENTANYL CITRATE 50 UG/ML
100 INJECTION, SOLUTION INTRAMUSCULAR; INTRAVENOUS ONCE
Status: COMPLETED | OUTPATIENT
Start: 2024-03-11 | End: 2024-03-11

## 2024-03-11 RX ORDER — DEXTROSE MONOHYDRATE 25 G/50ML
25-50 INJECTION, SOLUTION INTRAVENOUS
Status: DISCONTINUED | OUTPATIENT
Start: 2024-03-11 | End: 2024-03-12 | Stop reason: HOSPADM

## 2024-03-11 RX ORDER — AMOXICILLIN 250 MG
1 CAPSULE ORAL 2 TIMES DAILY
Status: DISCONTINUED | OUTPATIENT
Start: 2024-03-11 | End: 2024-03-12 | Stop reason: HOSPADM

## 2024-03-11 RX ORDER — ACETAMINOPHEN 325 MG/1
650 TABLET ORAL EVERY 4 HOURS PRN
Status: DISCONTINUED | OUTPATIENT
Start: 2024-03-14 | End: 2024-03-12 | Stop reason: HOSPADM

## 2024-03-11 RX ORDER — FENTANYL CITRATE 50 UG/ML
100 INJECTION, SOLUTION INTRAMUSCULAR; INTRAVENOUS
Status: DISCONTINUED | OUTPATIENT
Start: 2024-03-11 | End: 2024-03-11 | Stop reason: HOSPADM

## 2024-03-11 RX ORDER — FENTANYL CITRATE 50 UG/ML
50 INJECTION, SOLUTION INTRAMUSCULAR; INTRAVENOUS EVERY 5 MIN PRN
Status: DISCONTINUED | OUTPATIENT
Start: 2024-03-11 | End: 2024-03-11 | Stop reason: HOSPADM

## 2024-03-11 RX ORDER — HYDROMORPHONE HCL IN WATER/PF 6 MG/30 ML
0.2 PATIENT CONTROLLED ANALGESIA SYRINGE INTRAVENOUS EVERY 5 MIN PRN
Status: DISCONTINUED | OUTPATIENT
Start: 2024-03-11 | End: 2024-03-11 | Stop reason: HOSPADM

## 2024-03-11 RX ORDER — ONDANSETRON 2 MG/ML
INJECTION INTRAMUSCULAR; INTRAVENOUS PRN
Status: DISCONTINUED | OUTPATIENT
Start: 2024-03-11 | End: 2024-03-11

## 2024-03-11 RX ORDER — HYDROMORPHONE HCL IN WATER/PF 6 MG/30 ML
0.2 PATIENT CONTROLLED ANALGESIA SYRINGE INTRAVENOUS
Status: DISCONTINUED | OUTPATIENT
Start: 2024-03-11 | End: 2024-03-12 | Stop reason: HOSPADM

## 2024-03-11 RX ORDER — ONDANSETRON 2 MG/ML
4 INJECTION INTRAMUSCULAR; INTRAVENOUS EVERY 30 MIN PRN
Status: DISCONTINUED | OUTPATIENT
Start: 2024-03-11 | End: 2024-03-11 | Stop reason: HOSPADM

## 2024-03-11 RX ORDER — SODIUM CHLORIDE, SODIUM LACTATE, POTASSIUM CHLORIDE, CALCIUM CHLORIDE 600; 310; 30; 20 MG/100ML; MG/100ML; MG/100ML; MG/100ML
INJECTION, SOLUTION INTRAVENOUS CONTINUOUS
Status: DISCONTINUED | OUTPATIENT
Start: 2024-03-11 | End: 2024-03-11 | Stop reason: HOSPADM

## 2024-03-11 RX ORDER — POLYETHYLENE GLYCOL 3350 17 G/17G
17 POWDER, FOR SOLUTION ORAL DAILY
Status: DISCONTINUED | OUTPATIENT
Start: 2024-03-12 | End: 2024-03-12 | Stop reason: HOSPADM

## 2024-03-11 RX ORDER — OXYCODONE HYDROCHLORIDE 5 MG/1
10 TABLET ORAL EVERY 4 HOURS PRN
Status: DISCONTINUED | OUTPATIENT
Start: 2024-03-11 | End: 2024-03-12 | Stop reason: HOSPADM

## 2024-03-11 RX ORDER — LOSARTAN POTASSIUM 50 MG/1
50 TABLET ORAL DAILY
Status: DISCONTINUED | OUTPATIENT
Start: 2024-03-12 | End: 2024-03-12 | Stop reason: HOSPADM

## 2024-03-11 RX ORDER — ASPIRIN 81 MG/1
81 TABLET ORAL 2 TIMES DAILY
Qty: 60 TABLET | Refills: 0 | Status: SHIPPED | OUTPATIENT
Start: 2024-03-11 | End: 2024-06-11

## 2024-03-11 RX ORDER — ONDANSETRON 2 MG/ML
4 INJECTION INTRAMUSCULAR; INTRAVENOUS EVERY 6 HOURS PRN
Status: DISCONTINUED | OUTPATIENT
Start: 2024-03-11 | End: 2024-03-12 | Stop reason: HOSPADM

## 2024-03-11 RX ORDER — OXYCODONE HYDROCHLORIDE 5 MG/1
5 TABLET ORAL EVERY 4 HOURS PRN
Status: DISCONTINUED | OUTPATIENT
Start: 2024-03-11 | End: 2024-03-12 | Stop reason: HOSPADM

## 2024-03-11 RX ORDER — CEFAZOLIN SODIUM 2 G/100ML
2 INJECTION, SOLUTION INTRAVENOUS EVERY 8 HOURS
Qty: 200 ML | Refills: 0 | Status: COMPLETED | OUTPATIENT
Start: 2024-03-11 | End: 2024-03-12

## 2024-03-11 RX ORDER — OXYCODONE HYDROCHLORIDE 5 MG/1
5 TABLET ORAL
Status: DISCONTINUED | OUTPATIENT
Start: 2024-03-11 | End: 2024-03-11 | Stop reason: HOSPADM

## 2024-03-11 RX ORDER — NALOXONE HYDROCHLORIDE 0.4 MG/ML
0.1 INJECTION, SOLUTION INTRAMUSCULAR; INTRAVENOUS; SUBCUTANEOUS
Status: DISCONTINUED | OUTPATIENT
Start: 2024-03-11 | End: 2024-03-11 | Stop reason: HOSPADM

## 2024-03-11 RX ORDER — NICOTINE POLACRILEX 4 MG
15-30 LOZENGE BUCCAL
Status: DISCONTINUED | OUTPATIENT
Start: 2024-03-11 | End: 2024-03-12 | Stop reason: HOSPADM

## 2024-03-11 RX ORDER — CEFAZOLIN SODIUM/WATER 2 G/20 ML
2 SYRINGE (ML) INTRAVENOUS
Status: COMPLETED | OUTPATIENT
Start: 2024-03-11 | End: 2024-03-11

## 2024-03-11 RX ORDER — ALLOPURINOL 100 MG/1
100 TABLET ORAL DAILY
Status: DISCONTINUED | OUTPATIENT
Start: 2024-03-12 | End: 2024-03-12 | Stop reason: HOSPADM

## 2024-03-11 RX ORDER — ONDANSETRON 4 MG/1
4 TABLET, ORALLY DISINTEGRATING ORAL EVERY 6 HOURS PRN
Status: DISCONTINUED | OUTPATIENT
Start: 2024-03-11 | End: 2024-03-12 | Stop reason: HOSPADM

## 2024-03-11 RX ORDER — OXYCODONE HYDROCHLORIDE 5 MG/1
10 TABLET ORAL
Status: DISCONTINUED | OUTPATIENT
Start: 2024-03-11 | End: 2024-03-11 | Stop reason: HOSPADM

## 2024-03-11 RX ORDER — BISACODYL 10 MG
10 SUPPOSITORY, RECTAL RECTAL DAILY PRN
Status: DISCONTINUED | OUTPATIENT
Start: 2024-03-11 | End: 2024-03-12 | Stop reason: HOSPADM

## 2024-03-11 RX ORDER — SODIUM CHLORIDE, SODIUM LACTATE, POTASSIUM CHLORIDE, CALCIUM CHLORIDE 600; 310; 30; 20 MG/100ML; MG/100ML; MG/100ML; MG/100ML
INJECTION, SOLUTION INTRAVENOUS CONTINUOUS
Status: DISCONTINUED | OUTPATIENT
Start: 2024-03-11 | End: 2024-03-12 | Stop reason: HOSPADM

## 2024-03-11 RX ORDER — AMOXICILLIN 250 MG
1-2 CAPSULE ORAL 2 TIMES DAILY
Qty: 60 TABLET | Refills: 0 | Status: ON HOLD | OUTPATIENT
Start: 2024-03-11 | End: 2024-04-30

## 2024-03-11 RX ORDER — ACETAMINOPHEN 325 MG/1
975 TABLET ORAL EVERY 8 HOURS
Qty: 27 TABLET | Refills: 0 | Status: DISCONTINUED | OUTPATIENT
Start: 2024-03-11 | End: 2024-03-12 | Stop reason: HOSPADM

## 2024-03-11 RX ORDER — HYDROMORPHONE HCL IN WATER/PF 6 MG/30 ML
0.4 PATIENT CONTROLLED ANALGESIA SYRINGE INTRAVENOUS EVERY 5 MIN PRN
Status: DISCONTINUED | OUTPATIENT
Start: 2024-03-11 | End: 2024-03-11 | Stop reason: HOSPADM

## 2024-03-11 RX ORDER — NAPROXEN 250 MG/1
250 TABLET ORAL EVERY 12 HOURS PRN
Status: DISCONTINUED | OUTPATIENT
Start: 2024-03-11 | End: 2024-03-12 | Stop reason: HOSPADM

## 2024-03-11 RX ORDER — BUPIVACAINE HYDROCHLORIDE 7.5 MG/ML
INJECTION, SOLUTION INTRASPINAL PRN
Status: DISCONTINUED | OUTPATIENT
Start: 2024-03-11 | End: 2024-03-11

## 2024-03-11 RX ORDER — HYDROXYZINE HYDROCHLORIDE 10 MG/1
10 TABLET, FILM COATED ORAL EVERY 6 HOURS PRN
Qty: 30 TABLET | Refills: 0 | Status: SHIPPED | OUTPATIENT
Start: 2024-03-11 | End: 2024-06-11

## 2024-03-11 RX ORDER — BUPROPION HYDROCHLORIDE 150 MG/1
150 TABLET ORAL DAILY
Status: DISCONTINUED | OUTPATIENT
Start: 2024-03-12 | End: 2024-03-12 | Stop reason: HOSPADM

## 2024-03-11 RX ADMIN — ACETAMINOPHEN 975 MG: 325 TABLET ORAL at 14:23

## 2024-03-11 RX ADMIN — MIDAZOLAM HYDROCHLORIDE 2 MG: 1 INJECTION, SOLUTION INTRAMUSCULAR; INTRAVENOUS at 11:09

## 2024-03-11 RX ADMIN — TRANEXAMIC ACID 1950 MG: 650 TABLET ORAL at 09:14

## 2024-03-11 RX ADMIN — SENNOSIDES AND DOCUSATE SODIUM 1 TABLET: 8.6; 5 TABLET ORAL at 19:07

## 2024-03-11 RX ADMIN — ATORVASTATIN CALCIUM 20 MG: 10 TABLET, FILM COATED ORAL at 20:46

## 2024-03-11 RX ADMIN — BUPIVACAINE HYDROCHLORIDE 15 ML: 5 INJECTION, SOLUTION EPIDURAL; INTRACAUDAL; PERINEURAL at 11:09

## 2024-03-11 RX ADMIN — BUPIVACAINE HYDROCHLORIDE IN DEXTROSE 1.6 ML: 7.5 INJECTION, SOLUTION SUBARACHNOID at 11:22

## 2024-03-11 RX ADMIN — INSULIN ASPART 1 UNITS: 100 INJECTION, SOLUTION INTRAVENOUS; SUBCUTANEOUS at 20:46

## 2024-03-11 RX ADMIN — ASPIRIN 81 MG: 81 TABLET, COATED ORAL at 19:07

## 2024-03-11 RX ADMIN — OXYCODONE 10 MG: 5 TABLET ORAL at 19:07

## 2024-03-11 RX ADMIN — FENTANYL CITRATE 100 MCG: 50 INJECTION, SOLUTION INTRAMUSCULAR; INTRAVENOUS at 11:09

## 2024-03-11 RX ADMIN — PROPOFOL 70 MCG/KG/MIN: 10 INJECTION, EMULSION INTRAVENOUS at 11:23

## 2024-03-11 RX ADMIN — SODIUM CHLORIDE, POTASSIUM CHLORIDE, SODIUM LACTATE AND CALCIUM CHLORIDE: 600; 310; 30; 20 INJECTION, SOLUTION INTRAVENOUS at 09:50

## 2024-03-11 RX ADMIN — CEFAZOLIN SODIUM 2 G: 2 INJECTION, SOLUTION INTRAVENOUS at 19:07

## 2024-03-11 RX ADMIN — ONDANSETRON 4 MG: 2 INJECTION INTRAMUSCULAR; INTRAVENOUS at 12:12

## 2024-03-11 RX ADMIN — SODIUM CHLORIDE, POTASSIUM CHLORIDE, SODIUM LACTATE AND CALCIUM CHLORIDE: 600; 310; 30; 20 INJECTION, SOLUTION INTRAVENOUS at 13:01

## 2024-03-11 RX ADMIN — Medication 2 G: at 11:19

## 2024-03-11 RX ADMIN — FENOFIBRATE 48 MG: 48 TABLET, COATED ORAL at 20:46

## 2024-03-11 RX ADMIN — PHENYLEPHRINE HYDROCHLORIDE 0.4 MCG/KG/MIN: 10 INJECTION INTRAVENOUS at 11:25

## 2024-03-11 ASSESSMENT — ACTIVITIES OF DAILY LIVING (ADL)
ADLS_ACUITY_SCORE: 24
ADLS_ACUITY_SCORE: 25
ADLS_ACUITY_SCORE: 23
ADLS_ACUITY_SCORE: 25
ADLS_ACUITY_SCORE: 25
ADLS_ACUITY_SCORE: 24
ADLS_ACUITY_SCORE: 25
ADLS_ACUITY_SCORE: 24
ADLS_ACUITY_SCORE: 22
ADLS_ACUITY_SCORE: 24
ADLS_ACUITY_SCORE: 25
ADLS_ACUITY_SCORE: 22

## 2024-03-11 ASSESSMENT — COPD QUESTIONNAIRES: COPD: 1

## 2024-03-11 NOTE — PLAN OF CARE
Pt arrived onto the unit around 1420    Patient vital signs are at baseline: No,  Reason:  pt is currently on 3LPM   Patient able to ambulate as they were prior to admission or with assist devices provided by therapies during their stay:  No,  Reason:  pt has not gotten OOB since arriving onto the unit  Patient MUST void prior to discharge:  No,  Reason:  pt has not voided since arriving onto the unit.   Patient able to tolerate oral intake:  Yes  Pain has adequate pain control using Oral analgesics:  Yes  Does patient have an identified :  Yes  Has goal D/C date and time been discussed with patient:  Yes, plan is to discharge home tomorrow once medically stable.        Problem: Adult Inpatient Plan of Care  Goal: Absence of Hospital-Acquired Illness or Injury  Intervention: Prevent and Manage VTE (Venous Thromboembolism) Risk  Recent Flowsheet Documentation  Taken 3/11/2024 1430 by Jake Roth, RN  VTE Prevention/Management: SCDs (sequential compression devices) on

## 2024-03-11 NOTE — ANESTHESIA PROCEDURE NOTES
Adductor canal Procedure Note    Pre-Procedure   Staff -        Anesthesiologist:  Juan Lopez MD       Performed By: anesthesiologist       Location: pre-op       Procedure Start/Stop Times: 3/11/2024 11:05 AM and 3/11/2024 11:10 AM       Pre-Anesthestic Checklist: patient identified, IV checked, site marked, risks and benefits discussed, informed consent, monitors and equipment checked, pre-op evaluation, at physician/surgeon's request and post-op pain management  Timeout:       Correct Patient: Yes        Correct Procedure: Yes        Correct Site: Yes        Correct Position: Yes        Correct Laterality: Yes        Site Marked: Yes  Procedure Documentation  Procedure: Adductor canal       Laterality: left       Patient Position: supine       Skin prep: Chloraprep       Local skin infiltrated with 3 mL of 1% lidocaine.        Needle Type: other       Needle Gauge: 20.        Needle Length (Inches): 6        Ultrasound guided       1. Ultrasound was used to identify targeted nerve, plexus, vascular marker, or fascial plane and place a needle adjacent to it in real-time.       2. Ultrasound was used to visualize the spread of anesthetic in close proximity to the above referenced structure.       3. A permanent image is entered into the patient's record.       4. The visualized anatomic structures appeared normal.       5. There were no apparent abnormal pathologic findings.    Assessment/Narrative         The placement was negative for: blood aspirated, painful injection and site bleeding       Paresthesias: No.       Test dose of 3 mL at.         Test dose negative, 3 minutes after injection, for signs of intravascular, subdural, or intrathecal injection.       Bolus given via needle. no blood aspirated via catheter.        Secured via.        Insertion/Infusion Method: Single Shot       Complications: none       Injection made incrementally with aspirations every 5 mL.    Medication(s) Administered  "  Medication Administration Time: 3/11/2024 11:05 AM      FOR [facility] ONLY:   Pain Team Contact information: please page the Pain Team Via [employer]. Search \"Pain\". During daytime hours, please page the attending first. At night please page the resident first.      "

## 2024-03-11 NOTE — OP NOTE
Operative Report    PATIENT Christopher Lindsey   DATE OF SURGERY:  3/11/2024      PREOPERATIVE DIAGNOSIS   Osteoarthritis of left knee [M17.12].    POSTOPERATIVE DIAGNOSIS   Osteoarthritis of left knee [M17.12].    PROCEDURE PERFORMED   left total knee arthroplasty     IMPLANTS  Hall Triathlon CR femoral component, left size 4  2.   Lina Triathlon universal tibial baseplate, size 4  3.   Hall Triathlon N2VAC CS polyethylene, 9mm  4.   Hall Triathalon N2VAC asymmetric patella, 35mm    SURGEON  Ajay Pendleton, DO     ASSISTANT   Lavon Patel PA-C. PA-C was needed for positioning, draping, retraction/protection of vital structures, assistance with instrumentation and correct implant placement, deep periarticular injection, closure including deep capsular layer and maintaining patient safety throughout the procedure.  Several of these duties can only be performed by a LEXUS and not a lesser trained surgical assistant.    ANESTHESIA  Spinal with Block      ESTIMATED BLOOD LOSS  Less than 50 ml    TOURNIQUET TIME:  48 minutes    COMPLICATIONS   None.      FINDINGS: Grade 4 degenerative changes were noted in medial and patellofemoral compartments with exposed bone and multiple osteophytes.  Varus deformity.  5 degree flexion contracture.    Specimens: none    INDICATION   Christopher Lindsey is a 73 year old male who has been followed for left-sided knee pain. X-rays showed severe degenerative osteoarthritis.  Nonoperative treatment measures failed to improved the patient's symptoms. The risks and benefits of further non-operative treatment vs surgical treatment were discussed.  Christopher Lindsey wished to proceed with surgery.  The patient attended a preoperative teaching class for knee replacement.  They were cleared by a medical doctor for joint arthroplasty.    INFORMED CONSENT  Christopher Lindsey was identified in the preoperative holding area and was identified using medical record number, name, and date of birth,  all of which were confirmed. The left knee was marked using an indelible marker and informed consent was signed. Once again, all risks and benefits as well as alternatives to surgical intervention were discussed with the patient in detail and all the questions were answered. Risks discussed included but were not limited to: persistent pain, infection, bleeding, scarring, stiffness, thromboembolic events, fracture, malalignment/malrotation, severe limb dysfunction, loss of limb, and loss of life. The patient verbally confirmed the consent and wished to proceed with surgery as scheduled.     TECHNIQUE   The anesthesia service then proceeded with regional anesthesia and Christopher Lindsey was taken to the operating room and placed on the operating table in a supine position. A spinal was then performed in the operating room.  A tourniquet was placed high on the operative thigh.  Care was taken to pad all bony prominences well. A ZAN stocking was placed on the contralateral leg.  The operative extremity was then prepped and draped in a standard orthopedic fashion using DuraPrep. A preprocedural timeout was then performed once again confirming the patient's correct identity, correct procedure, correct side, and correct site. In addition, allergy status and required equipment were reviewed. Three independent members of the operating room team agreed on the above-mentioned parameters.  It was also confirmed that the patient received IV antibiotics and TXA per protocol.    At this point the leg was exsanguinated and the tourniquet was inflated.  A standard midline approach to the knee was utilized.  Sharp dissection was performed down to the level of the capsule.   A standard midvastus arthrotomy was performed. The anterior horn of the medial meniscus was resected and a medial release was performed around the tibial plateau. Medial osteophytes were resected.    The knee was extended and patellar fat pad was excised being  careful to protect the patellar tendon. The patellar osteophytes were removed and the patella was turned using towel clips in the soft tissue to secure it.  The patella was sized with a caliper, and an oscillating saw was used to make a flush cut.  The size of the patella was determined, and the lugs holes were drilled.  The patella protector was then placed.     Attention was turned to the femur.  The knee was flexed, and the remnants of the ACL and anterior horn of lateral menisci were resected.  The notch osteophytes were then removed.  A drill was used to access the femoral canal in line with the center of the notch. A 5 degree guide on the flexi-brayden was impacted flush and pinned.  The distal valgus cut was made using an oscillating saw.      Attention was turned back to the Tibia.  The retractors were placed and the extramedullary guide was assembled.  The slope was checked and the distal aspect was centered on the ankle.  The jig was pinned proximally, and the  was used to determine the depth of resection.  The proximal tibial resection was performed using an oscillating saw being careful to protect the bone block and PCL.  The jig was removed and a reciprocating saw was used to resect the remainder of the bone wafers.  The island was then sculpted using a rongeur. Laminar spreaders were placed with the knee in extension and the menisci were removed both medially and laterally. A 9mm spacer block was placed to check the extension gap for adequate resection of bone and alignment/balance. This was found to be appropriate.    Attention was turned back to the distal femur. A tibial protector was placed. The 3-degree external rotation guide was pinned in place.   Alignment was confirmed using whitesides line. The knee was taken into extension and the anterior femur synovium was minimally resected. Sizing was done in extension, referencing off of the posterior femur. The knee was flexed and the 4-in-1 cutting  block was pinned in place.  Careful attention was paid to protecting the soft tissue, and the anterior, posterior, and chamfer cuts were made. Lamina  was once again placed. The back of the femur was checked for osteophytes and remaining soft tissue, and a curved osteotome was used to remove the bone there, and do a minimal posterior release.      The tibia was exposed, and the tibial tray was pinned in the correct amount of external rotation.  The trial femur was placed.  The 9mm poly trial was inserted into place.  The trial patellar button was inserted as well.  The knee was examined and found to be well balanced in flexion and extension with good PCL tension.  The varus/valgus was stable. There was full extension and good flexion.  The patella tracked well. The femur lug holes were drilled and the femoral trial was removed.    The proximal tibia was exposed and the tibial tray was drilled using the guide. The punch was inserted carefully.  All trials were then removed.    The bony surfaces were irrigated with copious pulse lavage. The cement was mixed and the components were sequentially cemented with antibiotic cement.  The tibia first, followed by the femur and patella.  At each stage, excess cement was meticulously cleared.  The trial poly was inserted and the cement was allowed to polymerize in full extension. At this point, the knee was once again checked for loose cement.  Betadine lavage performed and tourniquet deflated.  Hemostasis obtained.  The final irrigation was performed using pulsatile lavage. A portion of the local injection was placed in the posterior capsule, with care to avoid vascular structures.  The definitive poly insert was placed and secured.      Remaining local injection was injected into the anterior soft tissues. The wound was closed in layers with #1 StrataFix, 0 Vicryl, 2-0 vicryl, and 3-0 monocryl, followed by skin glue. The aquacel dressing was applied. Followed by a  full leg ACE wrap.    The patient was taken to the PACU in stable condition. The case was clean.  All sponge and needle counts were correct at the end of the case.    The patient will be weight bearing as tolerated.  They will receive 24 hours of prophylactic antibiotics as well as DVT prophylaxis with mechanical and chemical means.     Implant Name Type Inv. Item Serial No.  Lot No. LRB No. Used Action   IMP BONE CEMENT SIMPLEX W/GENTAMICIN 6195-1-001 - [id number] Cement, Bone IMP BONE CEMENT SIMPLEX W/GENTAMICIN 6195-1-001  [employer] [id number] Left 2 Implanted   IMP INSERT BASEPLATE TIBIAL HOWM TRI 4 5521-B-400 - [id number] Total Joint Component/Insert IMP INSERT BASEPLATE TIBIAL HOWM TRI 4 5521-B-400  [employer] [id number] Left 1 Implanted   IMP COMP PATELLA HOWM TRI 35 10MM 5551-L-350 - [id number] Total Joint Component/Insert IMP COMP PATELLA HOWM TRI 35 10MM 5551-L-350  [employer] [id number] Left 1 Implanted   IMP COMP FEM STRK TRIATHLN CR LT 4 5510-F-401 - [id number] Total Joint Component/Insert IMP COMP FEM STRK TRIATHLN CR LT 4 5510-F-401  [employer] [id number] Left 1 Implanted   INSERT TIBIAL ARTICULAR SURFACE SZ 4 9MM THK POLYETHYLENE - [id number] Total Joint Component/Insert INSERT TIBIAL ARTICULAR SURFACE SZ 4 9MM THK POLYETHYLENE  [employer] [id number] Left 1 Implanted       Ajay Pendleton DO

## 2024-03-11 NOTE — PROVIDER NOTIFICATION
Dr. Lopez notified of low oxygenation saturation sitting at 92% on room air. Patient does have history of emphysema and TERESA and does not use a CPAP  Provider stated no interventions at this time.

## 2024-03-11 NOTE — ANESTHESIA PREPROCEDURE EVALUATION
Anesthesia Pre-Procedure Evaluation    Patient: Christopher Lindsey   MRN: [id number] : 1950        Procedure : Procedure(s):  RIGHT TOTAL KNEE ARTHROPLASTY          Past Medical History:   Diagnosis Date    Arrhythmia     Arthritis     Benign essential hypertension 2018    Diabetes (H)     Elevated triglycerides with high cholesterol 2018    Obesity (BMI 35.0-39.9) with comorbidity (H) 2018    TERESA (obstructive sleep apnea) 2018    Has BIPAP machine    Other emphysema (H) 2018    Hx following with pulmonology, [facility]    Patellofemoral disorder of both knees 2018    Prediabetes 2018    Recurrent major depressive disorder, in full remission (H24) 2018    Spinal headache     SVT (supraventricular tachycardia) 2018    Hx 2 episodes in past year, now on diltiazem for this      Past Surgical History:   Procedure Laterality Date    ARTHROPLASTY KNEE Right 2023    Procedure: RIGHT TOTAL KNEE ARTHROPLASTY;  Surgeon: Ajay Pendleton DO;  Location: [facility] Main OR    HEMORRHOIDECTOMY EXTERNAL  2004    INCISION AND DRAINAGE PERIRECTAL ABSCESS        No Known Allergies   Social History     Tobacco Use    Smoking status: Some Days     Packs/day: 1.00     Years: 40.00     Additional pack years: 0.00     Total pack years: 40.00     Types: Cigars, Cigarettes     Last attempt to quit: 2014     Years since quitting: 10.1    Smokeless tobacco: Never    Tobacco comments:     Rare cigar and E cig use   Substance Use Topics    Alcohol use: Yes     Alcohol/week: 56.0 standard drinks of alcohol     Types: 56 Glasses of wine per week     Comment: 21-28 per week, will cut back prior to surgery      Wt Readings from Last 1 Encounters:   24 109.8 kg (242 lb)        Anesthesia Evaluation   Pt has had prior anesthetic.         ROS/MED HX  ENT/Pulmonary: Comment: SPO2 88-90 on room air     (+) sleep apnea,    TERESA risk factors,                     COPD,             "  Neurologic:       Cardiovascular:     (+)  hypertension- -   -  - -                                      METS/Exercise Tolerance:     Hematologic:       Musculoskeletal:       GI/Hepatic:       Renal/Genitourinary:       Endo:     (+)  type II DM,        thyroid problem, hypothyroidism,    Obesity,       Psychiatric/Substance Use:     (+)   alcohol abuse      Infectious Disease:       Malignancy:       Other:            Physical Exam    Airway        Mallampati: III   TM distance: > 3 FB   Neck ROM: full   Mouth opening: > 3 cm    Respiratory Devices and Support         Dental           Cardiovascular             Pulmonary                 OUTSIDE LABS:  CBC:   Lab Results   Component Value Date    WBC 5.0 02/19/2024    WBC 9.9 11/17/2023    HGB 12.0 (L) 02/19/2024    HGB 13.3 11/17/2023    HCT 39.0 (L) 02/19/2024    HCT 41.3 11/17/2023     02/19/2024     11/17/2023     BMP:   Lab Results   Component Value Date     02/19/2024     11/17/2023    POTASSIUM 4.3 02/19/2024    POTASSIUM 4.4 11/17/2023    CHLORIDE 102 02/19/2024    CHLORIDE 102 11/17/2023    CO2 28 02/19/2024    CO2 29 11/17/2023    BUN 9.3 02/19/2024    BUN 10.8 11/17/2023    CR 0.66 (L) 02/19/2024    CR 0.69 11/17/2023     (H) 03/11/2024     (H) 02/19/2024     COAGS: No results found for: \"PTT\", \"INR\", \"FIBR\"  POC: No results found for: \"BGM\", \"HCG\", \"HCGS\"  HEPATIC:   Lab Results   Component Value Date    ALBUMIN 4.3 02/19/2024    PROTTOTAL 7.2 02/19/2024    ALT 29 02/19/2024    AST 28 02/19/2024    ALKPHOS 93 02/19/2024    BILITOTAL 0.3 02/19/2024     OTHER:   Lab Results   Component Value Date    A1C 5.8 (H) 02/19/2024    YOLA 9.6 02/19/2024    MAG 2.2 03/02/2023    TSH 1.68 09/24/2021       Anesthesia Plan    ASA Status:  3       Anesthesia Type: Spinal.              Consents    Anesthesia Plan(s) and associated risks, benefits, and realistic alternatives discussed. Questions answered and " patient/representative(s) expressed understanding.     - Discussed: Risks, Benefits and Alternatives for BOTH SEDATION and the PROCEDURE were discussed     - Discussed with:  Patient            Postoperative Care    Pain management: Peripheral nerve block (Single Shot).        Comments:                Juan Lopez MD

## 2024-03-11 NOTE — OR NURSING
Arthroplasty bone and tissue disposed of in yellow hazard bag at end of procedure per hospital policy. Surgeon stated bone and tissue did not need to be sent to pathology.

## 2024-03-11 NOTE — ANESTHESIA CARE TRANSFER NOTE
Patient: Christopher Lindsey    Procedure: Procedure(s):  LEFT TOTAL KNEE ARTHROPLASTY       Diagnosis: Osteoarthritis of left knee [M17.12]  Diagnosis Additional Information: No value filed.    Anesthesia Type:   Spinal     Note:    Oropharynx: oropharynx clear of all foreign objects  Level of Consciousness: drowsy  Oxygen Supplementation: face mask  Level of Supplemental Oxygen (L/min / FiO2): 3  Independent Airway: airway patency satisfactory and stable  Dentition: dentition unchanged  Vital Signs Stable: post-procedure vital signs reviewed and stable  Report to RN Given: handoff report given  Patient transferred to: PACU    Handoff Report: Identifed the Patient, Identified the Reponsible Provider, Reviewed the pertinent medical history, Discussed the surgical course, Reviewed Intra-OP anesthesia mangement and issues during anesthesia, Set expectations for post-procedure period and Allowed opportunity for questions and acknowledgement of understanding      Vitals:  Vitals Value Taken Time   /56 03/11/24 1320   Temp 99.3    Pulse 70 03/11/24 1320   Resp 23 03/11/24 1320   SpO2 97 % 03/11/24 1320   Vitals shown include unfiled device data.    Electronically Signed By: SULLY Nagy CRNA  March 11, 2024  1:22 PM

## 2024-03-11 NOTE — INTERVAL H&P NOTE
"I have reviewed the surgical (or preoperative) H&P that is linked to this encounter, and examined the patient. There are no significant changes    Clinical Conditions Present on Arrival:  Clinically Significant Risk Factors Present on Admission                  # Obesity: Estimated body mass index is 35.74 kg/m  as calculated from the following:    Height as of this encounter: 1.753 m (5' 9\").    Weight as of this encounter: 109.8 kg (242 lb).       "

## 2024-03-11 NOTE — ANESTHESIA PROCEDURE NOTES
"Intrathecal injection Procedure Note    Pre-Procedure   Staff -        Anesthesiologist:  Juan Lopez MD       Performed By: anesthesiologist       Location: OR       Procedure Start/Stop Times: 3/11/2024 11:20 AM and 3/11/2024 11:22 AM       Pre-Anesthestic Checklist: patient identified, IV checked, risks and benefits discussed, informed consent, monitors and equipment checked, pre-op evaluation, at physician/surgeon's request and post-op pain management  Timeout:       Correct Patient: Yes        Correct Procedure: Yes        Correct Site: Yes        Correct Position: Yes   Procedure Documentation  Procedure: intrathecal injection       Patient Position: sitting       Skin prep: Chloraprep       Insertion Site: L4-5. (right paramedian approach).       Needle Gauge: 24.        Needle Length (Inches): 3.5        Spinal Needle Type: Pencan       Introducer used       # of attempts: 1 and  # of redirects:     Assessment/Narrative         Paresthesias: No.       CSF fluid: clear.    Medication(s) Administered   Medication Administration Time: 3/11/2024 11:20 AM      FOR [facility] ([facility]) ONLY:   Pain Team Contact information: please page the Pain Team Via [employer]. Search \"Pain\". During daytime hours, please page the attending first. At night please page the resident first.      "

## 2024-03-11 NOTE — CONSULTS
[facility] MEDICINE CONSULT NOTE   Physician requesting consult: Ajay Pendleton DO    Reason for consult: Postoperative medical management of medical co-morbidities as below    Assessment and Plan    Christopher Lindsey is a 73 year old old male with a history of diet-controlled diabetes, essential hypertension, class I obesity who presents for elective left total knee arthroplasty.  [facility] [place] was asked to evaluate patient for postoperative medical management as follows below. Please resume the home medications as reconciled and further noted below with ordered hold parameters.  Vital signs have been stable post-operatively including hemodynamically stable blood pressure and heart rate. Thank you for this consult; we will continue to follow this patient until discharge.    Post left total knee arthroplasty  Routine postoperative cares  PT and OT  Pain control  Likely home in a.m. 3/12    Essential hypertension  Home meds with hold parameters  Monitor renal function due to regimen    Class II obesity    Obstructive sleep apnea  Noncompliant with CPAP  Continuous pulse oximetry  Limit narcotics  Supplemental oxygen as needed    Diet-controlled diabetes  Blood sugars before meals and at bedtime  Hold metformin  Correction insulin    Depression    Gout    Alcohol dependence without history of   4 to 5 glasses of wine a day  Denies history of withdrawal  Understands that this is more than recommended  Watch for signs of withdrawal        Procedure(s):  LEFT TOTAL KNEE ARTHROPLASTY  Post-operative Day: Day of Surgery  Code status:Full Code         Estimated Blood Loss:  25 mL    Hospital Problem List   No problem-specific Assessment & Plan notes found for this encounter.    Principal Problem:    Primary osteoarthritis of knee      -Reviewed the patient's preoperative H and P and updated missing elements.  -Home medication reconciliation has been reviewed.  Medications have been ordered as noted  from the home list and changes are documented above     HISTORY     Christopher Lindsey is a 73 year old old male with a history of hypertension, obstructive sleep apnea, and class II obesity who presents to the hospital for an elective orthopedic procedure.  Please the operative for details of the surgery.  Please the H&P was reviewed by me for preoperative course.  Role of hospital medicine discussed and questions answered.  Patient states he is feeling okay currently.  Has some supplemental oxygen 9.  Has a history of hypertension which is well-controlled on his current regimen.  No history of acute kidney injury.  He does drink 4-5 alcoholic beverages a day and has been counseled by his primary and others to decrease that amount.  He states he actually is working on cutting back.  He had no history of alcohol withdrawal.  He has non-insulin-dependent diabetes.  Has a history of depression and gout.    Past Medical History     Patient Active Problem List    Diagnosis Date Noted    Primary osteoarthritis of knee 11/16/2023     Priority: Medium    Alcohol dependence (H) 09/30/2022     Priority: Medium    Type 2 diabetes mellitus without complication, without long-term current use of insulin (H) 07/07/2020     Priority: Medium    Hx of gout 01/10/2019     Priority: Medium     Uric acid level 5.5 in 2017        Obesity hypoventilation syndrome (H) 01/08/2019     Priority: Medium    Restrictive lung disease 01/08/2019     Priority: Medium    Hearing problem of both ears 01/08/2019     Priority: Medium     Saw audiology 2017 - rec f/u 3 yrs        Multiple thyroid nodules 01/08/2019     Priority: Medium     Noted on thyroid US 2014 - 0.6 cm right nodule, 0.4 cm left nodule - rec   f/u 6-12 months        Abnormal liver ultrasound 01/08/2019     Priority: Medium     Changes c/w hepatocellular disease vs fatty liver        Hx of colonic polyps 01/08/2019     Priority: Medium     Colonoscopy 2016 - 2 hyperplastic polyps -  likely f/u 5 yrs        Elevated triglycerides with high cholesterol 12/04/2018     Priority: Medium    Benign essential hypertension 12/04/2018     Priority: Medium     Coronary calcium score 133 in 2017        Other emphysema (H) 12/04/2018     Priority: Medium     Hx following with pulmonology, [facility]        Recurrent major depressive disorder, in full remission (H24) 12/04/2018     Priority: Medium    SVT (supraventricular tachycardia) 12/04/2018     Priority: Medium     Hx 2 episodes in past year, now on diltiazem for this        TERESA (obstructive sleep apnea) 12/04/2018     Priority: Medium     Has BIPAP machine        Bilateral primary osteoarthritis of knee 12/04/2018     Priority: Medium    Obesity (BMI 35.0-39.9) with comorbidity (H) 12/04/2018     Priority: Medium        Surgical History   He  has a past surgical history that includes Hemorrhoidectomy external (2004); Incision And Drainage Perirectal Abscess; and Arthroplasty knee (Right, 11/16/2023).     Past Surgical History:   Procedure Laterality Date    ARTHROPLASTY KNEE Right 11/16/2023    Procedure: RIGHT TOTAL KNEE ARTHROPLASTY;  Surgeon: Ajay Pendleton DO;  Location: [facility] Main OR    HEMORRHOIDECTOMY EXTERNAL  2004    INCISION AND DRAINAGE PERIRECTAL ABSCESS         Family History    Reviewed, and family history includes Cancer (age of onset: 88.00) in his father; Cerebrovascular Disease in his maternal grandfather; Coronary Artery Disease in his paternal grandfather; Depression in his mother and sister; Diabetes in his sister; Heart Failure in his maternal grandmother, paternal grandfather, and paternal grandmother; No Known Problems in his brother and brother; Pancreatic Cancer (age of onset: 62.00) in his mother.    Social History    Reviewed, and he  reports that he has been smoking cigars and cigarettes. He has a 40 pack-year smoking history. He has never used smokeless tobacco. He reports current alcohol use of about 56.0 standard  drinks of alcohol per week. He reports that he does not use drugs.  Social History     Tobacco Use    Smoking status: Some Days     Packs/day: 1.00     Years: 40.00     Additional pack years: 0.00     Total pack years: 40.00     Types: Cigars, Cigarettes     Last attempt to quit: 1/1/2014     Years since quitting: 10.1    Smokeless tobacco: Never    Tobacco comments:     Rare cigar and E cig use   Substance Use Topics    Alcohol use: Yes     Alcohol/week: 56.0 standard drinks of alcohol     Types: 56 Glasses of wine per week     Comment: 21-28 per week, will cut back prior to surgery       Allergies   No Known Allergies    Prior to Admission Medications      Medications Prior to Admission   Medication Sig Dispense Refill Last Dose    allopurinol (ZYLOPRIM) 100 MG tablet TAKE 1 TABLET BY MOUTH ONCE DAILY 90 tablet 1 3/11/2024 at AM    atorvastatin (LIPITOR) 20 MG tablet TAKE ONE TABLET BY MOUTH EVERY DAY AT BEDTIME 90 tablet 3 3/10/2024 at PM    buPROPion (WELLBUTRIN XL) 150 MG 24 hr tablet TAKE ONE TABLET BY MOUTH EVERY DAY 90 tablet 0 3/11/2024 at AM    diltiazem ER COATED BEADS (CARDIZEM CD/CARTIA XT) 180 MG 24 hr capsule TAKE ONE CAPSULE BY MOUTH EVERY DAY 90 capsule 3 3/11/2024 at AM    fenofibrate (TRICOR) 48 MG tablet Take 1 tablet (48 mg) by mouth daily 90 tablet 3 3/10/2024 at PM    FLUoxetine (PROZAC) 20 MG capsule TAKE ONE CAPSULE BY MOUTH EVERY DAY 90 capsule 1 3/11/2024 at AM    losartan (COZAAR) 50 MG tablet TAKE ONE TABLET BY MOUTH EVERY DAY 90 tablet 0 3/11/2024 at AM    metFORMIN (GLUCOPHAGE) 500 MG tablet Take 1 tablet (500 mg) by mouth 2 times daily (with meals) 180 tablet 1 3/10/2024    naproxen sodium (ANAPROX) 220 MG tablet Take 220 mg by mouth daily   3/4/2024 at AM    OLANZapine (ZYPREXA) 5 MG tablet TAKE TWO TABLETS BY MOUTH EVERY  tablet 1 3/11/2024 at AM    blood glucose (NO BRAND SPECIFIED) lancets standard Use to test blood sugar 1 times daily or as directed. 100 each 1     blood  "glucose (NO BRAND SPECIFIED) test strip Use to test blood sugar 1 times daily or as directed. 100 strip 1     blood glucose (ONE TOUCH DELICA) lancing device Lancing device to be used with lancets. 1 each 0     blood glucose monitoring (ONETOUCH VERIO) meter device kit Use to test blood sugar 1 times daily or as directed. 1 kit 0     Lancets (ONETOUCH DELICA PLUS [id number]) MISC Inject 1 Lancet Subcutaneous daily          Review of Systems   A 12 point comprehensive review of systems was negative except as noted above.    OBJECTIVE         Physical Exam   Temp:  [97  F (36.1  C)-99.3  F (37.4  C)] 98  F (36.7  C)  Pulse:  [65-72] 69  Resp:  [20-25] 22  BP: (114-148)/(55-76) 114/59  SpO2:  [90 %-98 %] 91 %  Body mass index is 35.74 kg/m .  GENERAL:  Alert, appears comfortable, in no acute distress, appears stated age   HEAD:  Normocephalic, without obvious abnormality, atraumatic   EYES:  PERRL, conjunctiva/corneas clear, no scleral icterus, EOM's intact   NECK: Supple, symmetrical, trachea midline   LUNGS:   Clear to auscultation bilaterally, no rales, rhonchi, or wheezing, symmetric chest rise on inhalation, respirations unlabored   HEART:  Regular rate and rhythm, S1 and S2 normal, no murmur, rub, or gallop    ABDOMEN:   Soft, non-tender, bowel sounds active all four quadrants, no masses, no organomegaly, no rebound or guarding   EXTREMITIES: Extremities normal, atraumatic, no cyanosis or edema    SKIN: Dry to touch, no exanthems in the visualized areas   NEURO: Alert, oriented x 4, moves all four extremities freely/spontaneously   PSYCH: Cooperative, behavior is appropriate          Imaging Reviewed Personally By Myself    Radiology Results:   Recent Results (from the past 24 hour(s))   POC US Guidance Needle Placement    Narrative    Ultrasound was performed as guidance to an anesthesia procedure.  Click   \"PACS images\" hyperlink below to view any stored images.  For specific   procedure details, view procedure " "note authored by anesthesia.   XR Knee Port Left 1/2 Views    Narrative    EXAM: XR KNEE PORT LEFT 1/2 VIEWS  LOCATION: [facility]  DATE: 3/11/2024    INDICATION: Post Op Total Knee  COMPARISON: 9/10/2019      Impression    IMPRESSION: Status post recent left total knee arthroplasty. No immediate hardware complication. Expected postsurgical soft tissue edema, subcutaneous emphysema, and gas containing joint effusion. No acute fracture or malalignment.       Labs Reviewed Personally By Myself     Results for orders placed or performed during the hospital encounter of 03/11/24 (from the past 24 hour(s))   POC US Guidance Needle Placement    Narrative    Ultrasound was performed as guidance to an anesthesia procedure.  Click   \"PACS images\" hyperlink below to view any stored images.  For specific   procedure details, view procedure note authored by anesthesia.   Glucose by meter   Result Value Ref Range    GLUCOSE BY METER POCT 148 (H) 70 - 99 mg/dL   Glucose by meter   Result Value Ref Range    GLUCOSE BY METER POCT 115 (H) 70 - 99 mg/dL   XR Knee Port Left 1/2 Views    Narrative    EXAM: XR KNEE PORT LEFT 1/2 VIEWS  LOCATION: [facility]  DATE: 3/11/2024    INDICATION: Post Op Total Knee  COMPARISON: 9/10/2019      Impression    IMPRESSION: Status post recent left total knee arthroplasty. No immediate hardware complication. Expected postsurgical soft tissue edema, subcutaneous emphysema, and gas containing joint effusion. No acute fracture or malalignment.     Most Recent 3 CBC's:  Recent Labs   Lab Test 02/19/24  1119 11/17/23  0526 11/03/23  0856   WBC 5.0 9.9 7.1   HGB 12.0* 13.3 14.8   MCV 94 102* 103*    153 161     Most Recent 3 BMP's:  Recent Labs   Lab Test 03/11/24  1339 03/11/24  0936 02/19/24  1119 11/17/23  0629 11/17/23  0525 11/16/23  0629 11/03/23  0856   NA  --   --  141  --  139  --  142   POTASSIUM  --   --  4.3  --  4.4  --  4.4   CHLORIDE  " --   --  102  --  102  --  105   CO2  --   --  28  --  29  --  27   BUN  --   --  9.3  --  10.8  --  9.9   CR  --   --  0.66*  --  0.69  --  0.68   ANIONGAP  --   --  11  --  8  --  10   YOLA  --   --  9.6  --  8.7*  --  9.1   * 148* 150*   < > 160*   < > 168*    < > = values in this interval not displayed.     Most Recent 2 LFT's:  Recent Labs   Lab Test 02/19/24  1119 10/06/23  0959   AST 28 50*   ALT 29 34   ALKPHOS 93 104   BILITOTAL 0.3 0.4     Most Recent 3 INR's:No lab results found.    Preoperative Labs Reviewed Personally By Myself     Thank you for this consultation.  Appreciate the opportunity to participate in the care of Christopher MACK Stephonysabelbarbara, please feel free to contact us for any questions or concerns.    Ethan Bernal MD  [facility]  Phone: #[phone]    Tt 55 min

## 2024-03-11 NOTE — PHARMACY-ADMISSION MEDICATION HISTORY
Pharmacist JOSE DAVID Medication History    Admission medication history is complete. The information provided in this note is only as accurate as the sources available at the time of the update.    Medication reconciliation/reorder completed by provider prior to medication history? No    Information Source(s): Patient and Clinic records and Care Everywhere via in-person    Pertinent Information: N/A    Allergies reviewed with patient and updates made in EHR: yes    Medications available for use during hospital stay: None.      Medication History Completed By: Sanket Ashford [facility] 3/11/2024 9:46 AM    PTA Med List   Medication Sig Last Dose    allopurinol (ZYLOPRIM) 100 MG tablet TAKE 1 TABLET BY MOUTH ONCE DAILY 3/11/2024 at AM    atorvastatin (LIPITOR) 20 MG tablet TAKE ONE TABLET BY MOUTH EVERY DAY AT BEDTIME 3/10/2024 at PM    buPROPion (WELLBUTRIN XL) 150 MG 24 hr tablet TAKE ONE TABLET BY MOUTH EVERY DAY 3/11/2024 at AM    diltiazem ER COATED BEADS (CARDIZEM CD/CARTIA XT) 180 MG 24 hr capsule TAKE ONE CAPSULE BY MOUTH EVERY DAY 3/11/2024 at AM    fenofibrate (TRICOR) 48 MG tablet Take 1 tablet (48 mg) by mouth daily 3/10/2024 at PM    FLUoxetine (PROZAC) 20 MG capsule TAKE ONE CAPSULE BY MOUTH EVERY DAY 3/11/2024 at AM    losartan (COZAAR) 50 MG tablet TAKE ONE TABLET BY MOUTH EVERY DAY 3/11/2024 at AM    metFORMIN (GLUCOPHAGE) 500 MG tablet Take 1 tablet (500 mg) by mouth 2 times daily (with meals) 3/10/2024    naproxen sodium (ANAPROX) 220 MG tablet Take 220 mg by mouth daily 3/4/2024 at AM    OLANZapine (ZYPREXA) 5 MG tablet TAKE TWO TABLETS BY MOUTH EVERY DAY 3/11/2024 at AM

## 2024-03-11 NOTE — ANESTHESIA POSTPROCEDURE EVALUATION
Patient: Christopher Lindsey    Procedure: Procedure(s):  LEFT TOTAL KNEE ARTHROPLASTY       Anesthesia Type:  Spinal    Note:  Disposition: Inpatient   Postop Pain Control:             Sign Out: Well controlled pain   PONV: No   Neuro/Psych:             Sign Out: Acceptable/Baseline neuro status   Airway/Respiratory:             Sign Out: Acceptable/Baseline resp. status   CV/Hemodynamics:             Sign Out: Acceptable CV status   Other NRE: NONE   DID A NON-ROUTINE EVENT OCCUR?            Last vitals:  Vitals Value Taken Time   /60 03/11/24 1400   Temp 37.4  C (99.3  F) 03/11/24 1321   Pulse 68 03/11/24 1406   Resp 25 03/11/24 1406   SpO2 93 % 03/11/24 1406   Vitals shown include unfiled device data.    Electronically Signed By: Juan Lopez MD  March 11, 2024  2:44 PM

## 2024-03-12 ENCOUNTER — APPOINTMENT (OUTPATIENT)
Dept: PHYSICAL THERAPY | Facility: CLINIC | Age: 74
End: 2024-03-12
Attending: ORTHOPAEDIC SURGERY
Payer: MEDICARE

## 2024-03-12 VITALS
BODY MASS INDEX: 35.84 KG/M2 | RESPIRATION RATE: 20 BRPM | DIASTOLIC BLOOD PRESSURE: 61 MMHG | HEIGHT: 69 IN | WEIGHT: 242 LBS | OXYGEN SATURATION: 93 % | HEART RATE: 86 BPM | SYSTOLIC BLOOD PRESSURE: 125 MMHG | TEMPERATURE: 98.1 F

## 2024-03-12 LAB
ANION GAP SERPL CALCULATED.3IONS-SCNC: 5 MMOL/L (ref 7–15)
BUN SERPL-MCNC: 13.7 MG/DL (ref 8–23)
CALCIUM SERPL-MCNC: 8.9 MG/DL (ref 8.8–10.2)
CHLORIDE SERPL-SCNC: 101 MMOL/L (ref 98–107)
CREAT SERPL-MCNC: 0.8 MG/DL (ref 0.67–1.17)
DEPRECATED HCO3 PLAS-SCNC: 30 MMOL/L (ref 22–29)
EGFRCR SERPLBLD CKD-EPI 2021: >90 ML/MIN/1.73M2
GLUCOSE BLDC GLUCOMTR-MCNC: 140 MG/DL (ref 70–99)
GLUCOSE BLDC GLUCOMTR-MCNC: 157 MG/DL (ref 70–99)
GLUCOSE SERPL-MCNC: 164 MG/DL (ref 70–99)
HGB BLD-MCNC: 11.2 G/DL (ref 13.3–17.7)
POTASSIUM SERPL-SCNC: 4.5 MMOL/L (ref 3.4–5.3)
SODIUM SERPL-SCNC: 136 MMOL/L (ref 135–145)

## 2024-03-12 PROCEDURE — 80048 BASIC METABOLIC PNL TOTAL CA: CPT | Performed by: FAMILY MEDICINE

## 2024-03-12 PROCEDURE — 250N000012 HC RX MED GY IP 250 OP 636 PS 637: Performed by: FAMILY MEDICINE

## 2024-03-12 PROCEDURE — 97110 THERAPEUTIC EXERCISES: CPT | Mod: GP

## 2024-03-12 PROCEDURE — 36415 COLL VENOUS BLD VENIPUNCTURE: CPT | Performed by: PHYSICIAN ASSISTANT

## 2024-03-12 PROCEDURE — 999N000111 HC STATISTIC OT IP EVAL DEFER

## 2024-03-12 PROCEDURE — 250N000011 HC RX IP 250 OP 636: Performed by: PHYSICIAN ASSISTANT

## 2024-03-12 PROCEDURE — 82962 GLUCOSE BLOOD TEST: CPT

## 2024-03-12 PROCEDURE — 250N000013 HC RX MED GY IP 250 OP 250 PS 637: Performed by: FAMILY MEDICINE

## 2024-03-12 PROCEDURE — 85018 HEMOGLOBIN: CPT | Performed by: PHYSICIAN ASSISTANT

## 2024-03-12 PROCEDURE — 97116 GAIT TRAINING THERAPY: CPT | Mod: GP

## 2024-03-12 PROCEDURE — 97161 PT EVAL LOW COMPLEX 20 MIN: CPT | Mod: GP

## 2024-03-12 PROCEDURE — 99232 SBSQ HOSP IP/OBS MODERATE 35: CPT | Performed by: FAMILY MEDICINE

## 2024-03-12 PROCEDURE — 258N000003 HC RX IP 258 OP 636: Performed by: PHYSICIAN ASSISTANT

## 2024-03-12 PROCEDURE — 250N000013 HC RX MED GY IP 250 OP 250 PS 637: Performed by: PHYSICIAN ASSISTANT

## 2024-03-12 RX ADMIN — INSULIN ASPART 1 UNITS: 100 INJECTION, SOLUTION INTRAVENOUS; SUBCUTANEOUS at 06:54

## 2024-03-12 RX ADMIN — OXYCODONE 5 MG: 5 TABLET ORAL at 07:56

## 2024-03-12 RX ADMIN — POLYETHYLENE GLYCOL 3350 17 G: 17 POWDER, FOR SOLUTION ORAL at 07:57

## 2024-03-12 RX ADMIN — SENNOSIDES AND DOCUSATE SODIUM 1 TABLET: 8.6; 5 TABLET ORAL at 07:57

## 2024-03-12 RX ADMIN — DILTIAZEM HYDROCHLORIDE 180 MG: 180 CAPSULE, EXTENDED RELEASE ORAL at 08:01

## 2024-03-12 RX ADMIN — LOSARTAN POTASSIUM 50 MG: 50 TABLET, FILM COATED ORAL at 08:01

## 2024-03-12 RX ADMIN — HYDROXYZINE HYDROCHLORIDE 10 MG: 10 TABLET ORAL at 07:57

## 2024-03-12 RX ADMIN — OXYCODONE 5 MG: 5 TABLET ORAL at 03:16

## 2024-03-12 RX ADMIN — OLANZAPINE 10 MG: 5 TABLET, FILM COATED ORAL at 08:01

## 2024-03-12 RX ADMIN — ACETAMINOPHEN 975 MG: 325 TABLET ORAL at 07:57

## 2024-03-12 RX ADMIN — ACETAMINOPHEN 975 MG: 325 TABLET ORAL at 00:30

## 2024-03-12 RX ADMIN — BUPROPION HYDROCHLORIDE 150 MG: 150 TABLET, EXTENDED RELEASE ORAL at 08:00

## 2024-03-12 RX ADMIN — HYDROXYZINE HYDROCHLORIDE 10 MG: 10 TABLET ORAL at 03:17

## 2024-03-12 RX ADMIN — SODIUM CHLORIDE, POTASSIUM CHLORIDE, SODIUM LACTATE AND CALCIUM CHLORIDE: 600; 310; 30; 20 INJECTION, SOLUTION INTRAVENOUS at 00:31

## 2024-03-12 RX ADMIN — ALLOPURINOL 100 MG: 100 TABLET ORAL at 08:00

## 2024-03-12 RX ADMIN — CEFAZOLIN SODIUM 2 G: 2 INJECTION, SOLUTION INTRAVENOUS at 03:12

## 2024-03-12 RX ADMIN — ASPIRIN 81 MG: 81 TABLET, COATED ORAL at 08:01

## 2024-03-12 ASSESSMENT — ACTIVITIES OF DAILY LIVING (ADL)
ADLS_ACUITY_SCORE: 24
ADLS_ACUITY_SCORE: 25

## 2024-03-12 NOTE — PROGRESS NOTES
"Orthopedic Progress Note      Assessment: 1 Day Post-Op  S/P Procedure(s):  LEFT TOTAL KNEE ARTHROPLASTY @    Plan:   - Continue PT/OT.   - Weightbearing status: WBAT.  - Anticoagulation: ASA in addition to SCDs, tarah stockings and early ambulation.  - Discharge planning: Discharge to home today.     Subjective:  Pain: Well controlled on Tylenol & oxycodone.  Nausea, Vomiting:  No  Chest pain: No  Lightheadedness, Dizziness:  No  Neuro:  Patient denies new onset numbness or paresthesias in left lower extremity     Patient is doing well on POD #1. Ambulating, tolerating oral intake, voiding & pain is controlled with oral medication. Ready for discharge. Hgb 11.2 (pre-op 12).     Objective:  /61 (BP Location: Right arm, Patient Position: Chair)   Pulse 86   Temp 98.1  F (36.7  C) (Oral)   Resp 20   Ht 1.753 m (5' 9\")   Wt 109.8 kg (242 lb)   SpO2 93%   BMI 35.74 kg/m    The patient is A&Ox3. Appears comfortable, sitting up at bedside.  Sensation is intact to light touch & equal bilaterally in the L2 through S1 dermatomes.  Calves are soft and non-tender.  Dorsiflexion and plantar flexion is intact bilaterally.  Appropriate flexion and extension of the toes bilaterally.   Brisk capillary refill in the toes bilaterally.   Palpable left dorsalis pedis pulse.  left knee dressing C/D/I.      Pertinent Labs   Lab Results: personally reviewed.   No results found for: \"INR\", \"PROTIME\"  Lab Results   Component Value Date    WBC 5.0 02/19/2024    HGB 11.2 (L) 03/12/2024    HCT 39.0 (L) 02/19/2024    MCV 94 02/19/2024     02/19/2024     Lab Results   Component Value Date     03/12/2024    CO2 30 (H) 03/12/2024         Report completed by:  Kisha Blanchard PA-C/[facility]    Date: 3/12/2024  Time: 9:06 AM    "

## 2024-03-12 NOTE — PROGRESS NOTES
Physical Therapy Discharge Summary    Reason for therapy discharge:    All goals and outcomes met, no further needs identified.    Progress towards therapy goal(s). See goals on Care Plan in [facility] electronic health record for goal details.  Goals met    Therapy recommendation(s):    Continued therapy is recommended.  Rationale/Recommendations:  OP PT.  Continue home exercise program.

## 2024-03-12 NOTE — PLAN OF CARE
Goal Outcome Evaluation:      Plan of Care Reviewed With: patient, spouse          Outcome Evaluation: Met goals to d/c home with wife. Medically cleared by hospitalist.  Pain well controlled. Bladder/bowel no complaints. Tolerated ambulation in hallway and therapy withPT/OT  Good appetite.  Will go to discharge lounge for discharge completion

## 2024-03-12 NOTE — PLAN OF CARE
Problem: Adult Inpatient Plan of Care  Goal: Optimal Comfort and Wellbeing  Intervention: Monitor Pain and Promote Comfort  Goal Outcome Evaluation:  Patient vital signs are at baseline: Yes. Was on Oxygen via NC at night.   Patient able to ambulate as they were prior to admission or with assist devices provided by therapies during their stay:  Yes  Patient MUST void prior to discharge:  Yes  Patient able to tolerate oral intake:  Yes  Pain has adequate pain control using Oral analgesics:  Yes. Scheduled Tylenol and PRN Oxycodone administered for pain control. Utilized ice packs for comfort.   Does patient have an identified :  Yes  Has goal D/C date and time been discussed with patient:  Yes

## 2024-03-12 NOTE — PROGRESS NOTES
Care Management Discharge Note    Discharge Date: 03/12/2024       Discharge Disposition: Home    Discharge Services: None    Discharge DME: None    Discharge Transportation: family or friend will provide    Private pay costs discussed: Not applicable    Does the patient's insurance plan have a 3 day qualifying hospital stay waiver?  Yes     Which insurance plan 3 day waiver is available? [employer]    Will the waiver be used for post-acute placement? No    PAS Confirmation Code:    Patient/family educated on Medicare website which has current facility and service quality ratings: no    Education Provided on the Discharge Plan: Yes  Persons Notified of Discharge Plans: pt  Patient/Family in Agreement with the Plan: yes    Handoff Referral Completed: Yes    Additional Information:  Pt to discharge to home. Family/friend to transport.     Pt to follow-up with Surgeon Team and start OP PT.     No CM needs for discharge.     Edmundo Loera RN

## 2024-03-12 NOTE — PLAN OF CARE
Problem: Adult Inpatient Plan of Care  Goal: Absence of Hospital-Acquired Illness or Injury  Intervention: Identify and Manage Fall Risk  Recent Flowsheet Documentation  Taken 3/11/2024 1830 by Nigel Askew RN  Safety Promotion/Fall Prevention:   activity supervised   assistive device/personal items within reach   clutter free environment maintained   increased rounding and observation   nonskid shoes/slippers when out of bed   patient and family education   room near nurse's station   room organization consistent   safety round/check completed   supervised activity  Taken 3/11/2024 1630 by Nigel Askew RN  Safety Promotion/Fall Prevention:   activity supervised   assistive device/personal items within reach   clutter free environment maintained   increased rounding and observation   nonskid shoes/slippers when out of bed   patient and family education   room near nurse's station   room organization consistent   safety round/check completed   supervised activity   Goal Outcome Evaluation:      Plan of Care Reviewed With: patient    Overall Patient Progress: improvingOverall Patient Progress: improving  Pt alert and oriented, vss on room air, can voice needs, pain meds given, ambulated and voided, potential discharge tomorrow.

## 2024-03-12 NOTE — PROGRESS NOTES
"[facility]    Medicine Progress Note - Hospitalist Service    Date of Admission:  3/11/2024    Assessment & Plan   Christopher Lindsey is a 73 year old old male with a history of diet-controlled diabetes, essential hypertension, class I obesity who presents for elective left total knee arthroplasty.  Doing well although having some mild perioperative hypoxia that resolved with inspiration and activity.     Post left total knee arthroplasty  Routine postoperative cares  PT and OT  Pain control  Likely home in a.m. 3/12    Mild intermittent hypoxia  Due to narcotics and atelectasis  Encourage I-S urine at home  Resolves when ambulates     Essential hypertension  Home meds with hold parameters  Monitor renal function due to regimen     Class II obesity     Obstructive sleep apnea  Noncompliant with CPAP  Continuous pulse oximetry  Limit narcotics  Supplemental oxygen as needed     Diet-controlled diabetes  Blood sugars before meals and at bedtime  Hold metformin  Correction insulin     Depression     Gout     Alcohol dependence without history of   4 to 5 glasses of wine a day  Denies history of withdrawal  Understands that this is more than recommended  Watch for signs of withdrawal          Diet: Advance Diet as Tolerated: Regular Diet Adult  Discharge Instruction - Regular Diet Adult    DVT Prophylaxis: Defer to primary service  Dye Catheter: Not present  Lines: None     Cardiac Monitoring: None  Code Status: Full Code      Clinically Significant Risk Factors Present on Admission                  # Hypertension: Noted on problem list      # Obesity: Estimated body mass index is 35.74 kg/m  as calculated from the following:    Height as of this encounter: 1.753 m (5' 9\").    Weight as of this encounter: 109.8 kg (242 lb).              Disposition Plan      Expected Discharge Date: 03/12/2024,  9:00 AM                  Ethan Bernal MD  Hospitalist Service  [facility] " Hospital  Securely message with Anthony (more info)  Text page via [facility] Paging/Directory   ______________________________________________________________________    Interval History   Ready to go.  Aware of low oxygen sats intermittently.  States he has had the same problem in the past and that he chronically runs low.  His primary knows this.  Of note he did have improvement in his respiratory status and oxygen levels with ambulation.  No history of DVT or PE.  He wants to go home.    Physical Exam   Vital Signs: Temp: 98.1  F (36.7  C) Temp src: Oral BP: 125/61 Pulse: 86   Resp: 20 SpO2: 93 % O2 Device: Nasal cannula Oxygen Delivery: 2 LPM  Weight: 242 lbs 0 oz    General Appearance: No apparent distress  Respiratory: Clear to auscultation  Cardiovascular: Regular rate and rhythm  GI: Soft and nontender  Skin: Visualized skin is normal  Other: Good eye contact with normal affect requesting discharge      40 MINUTES SPENT BY ME on the date of service doing chart review, history, exam, documentation & further activities per the note.      Data     I have personally reviewed the following data over the past 24 hrs:    N/A  \   11.2 (L)   / N/A     136 101 13.7 /  140 (H)   4.5 30 (H) 0.80 \       Imaging results reviewed over the past 24 hrs:   No results found for this or any previous visit (from the past 24 hour(s)).

## 2024-03-12 NOTE — PROGRESS NOTES
03/12/24 0800   Appointment Info   Signing Clinician's Name / Credentials (PT) Fanny Arellano, PT, DPT   Quick Adds   Quick Adds Certification   Living Environment   People in Home spouse   Current Living Arrangements house   Home Accessibility stairs to enter home   Number of Stairs, Main Entrance 2   Stair Railings, Main Entrance railings on both sides of stairs   Self-Care   Equipment Currently Used at Home none   Fall history within last six months no   Activity/Exercise/Self-Care Comment Has JAIRON and cane   General Information   Onset of Illness/Injury or Date of Surgery 03/11/24   Referring Physician Dr. Ajay Pendleton   Patient/Family Therapy Goals Statement (PT) Walk without pain   Pertinent History of Current Problem (include personal factors and/or comorbidities that impact the POC) s/p L TKA   Weight-Bearing Status - LLE weight-bearing as tolerated   Range of Motion (ROM)   ROM Comment decreased ROM s/p L TKA   Transfers   Transfers sit-stand transfer   Maintains Weight-bearing Status (Transfers) able to maintain   Sit-Stand Transfer   Sit-Stand Rooks (Transfers) contact guard;verbal cues   Assistive Device (Sit-Stand Transfers) walker, front-wheeled   Gait/Stairs (Locomotion)   Rooks Level (Gait) contact guard;verbal cues   Assistive Device (Gait) walker, front-wheeled   Distance in Feet (Gait) 10   Pattern (Gait) step-to   Deviations/Abnormal Patterns (Gait) antalgic;festinating/shuffling;edvin decreased   Maintains Weight-bearing Status (Gait) able to maintain   Clinical Impression   Criteria for Skilled Therapeutic Intervention Yes, treatment indicated   PT Diagnosis (PT) impaired functional mobility   Influenced by the following impairments pain, decreased strength   Functional limitations due to impairments gait, stairs, transfers   Clinical Presentation (PT Evaluation Complexity) stable   Clinical Presentation Rationale pt presents as medically diagnosed   Clinical Decision  Making (Complexity) low complexity   Planned Therapy Interventions (PT) gait training;home exercise program;patient/family education;stair training;strengthening;transfer training;ROM (range of motion)   Risk & Benefits of therapy have been explained care plan/treatment goals reviewed;patient   PT Total Evaluation Time   PT Eval, Low Complexity Minutes (97402) 10   Therapy Certification   Start of care date 03/12/24   Certification date from 03/12/24   Certification date to 04/09/24   Medical Diagnosis S/P L TKA   Physical Therapy Goals   PT Frequency One time eval and treatment only   PT Predicted Duration/Target Date for Goal Attainment 03/12/24   PT Goals PT Goal 1;Gait;Stairs   PT: Gait Rolling walker;Supervision/stand-by assist;50 feet;Goal Met   PT: Stairs Supervision/stand-by assist;2 stairs;Rail on both sides;Goal Met   PT: Goal 1 Independent with written HEP for LE strengthening and ROM  (Goal Met)   Interventions   Interventions Quick Adds Therapeutic Procedure;Therapeutic Activity;Gait Training   Therapeutic Procedure/Exercise   Ther. Procedure: strength, endurance, ROM, flexibillity Minutes (35633) 10   Symptoms Noted During/After Treatment increased pain   Treatment Detail/Skilled Intervention TKA protocol therex x10 reps with L LE, verbal cueing for exercise technique. Reviewed icing   Therapeutic Activity   Treatment Detail/Skilled Intervention sit to/from stand x2 reps with FWW, SBA, verbal cueing for safe hand placement and LE placement for pain management   Gait Training   Gait Training Minutes (88412) 15   Symptoms Noted During/After Treatment (Gait Training) increased pain;fatigue   Treatment Detail/Skilled Intervention verbal cueing for safety, posture, LE advancement. Education on ambulation progression at home and changing positions frequently. Sp02 on RA with ambulation 93%, after stairs 86%, RN notified. Sp02 at rest 88%, RN notified and placed back on 2L. Stairs: 4 stairs with bilateral  rails, nonreciprocal pattern, SBA, verbal cueing for safety and patterning   Distance in Feet 60   Wagoner Level (Gait Training) stand-by assist   Physical Assistance Level (Gait Training) verbal cues   Weight Bearing (Gait Training) weight-bearing as tolerated   Assistive Device (Gait Training) rolling walker   Pattern Analysis (Gait Training) swing-to gait   Gait Analysis Deviations decreased edvin;decreased step length;decreased swing-to-stance ratio;decreased toe-to-floor clearance   Impairments (Gait Analysis/Training) pain   PT Discharge Planning   PT Plan d/c PT   PT Discharge Recommendation (DC Rec) other (see comments)  (Defer to ortho)   PT Rationale for DC Rec Patient ambulating and negotiating stairs safely, spouse for support at home   PT Brief overview of current status Amb 60ft with FWW, SBA   PT Equipment Needed at Discharge walker, rolling   Total Session Time   Timed Code Treatment Minutes 25   Total Session Time (sum of timed and untimed services) 35   [facility]  OUTPATIENT PHYSICAL THERAPY EVALUATION  PLAN OF TREATMENT FOR OUTPATIENT REHABILITATION  (COMPLETE FOR INITIAL CLAIMS ONLY)  Patient's Last Name, First Name, M.I.  YOB: 1950  Christopher Lindsey                        Provider's Name  [facility] Medical Record No.  [id number]                             Onset Date:  03/11/24   Start of Care Date:  03/12/24   Type:     _X_PT   ___OT   ___SLP Medical Diagnosis:  S/P L TKA              PT Diagnosis:  impaired functional mobility Visits from SOC:  1     See note for plan of treatment, functional goals and certification details    I CERTIFY THE NEED FOR THESE SERVICES FURNISHED UNDER        THIS PLAN OF TREATMENT AND WHILE UNDER MY CARE     (Physician co-signature of this document indicates review and certification of the therapy plan).

## 2024-03-12 NOTE — DISCHARGE SUMMARY
"ORTHOPEDIC DISCHARGE SUMMARY       Christopher Lindsey,  1950, MRN [id number]    Admission Date: 3/11/2024      Admission Diagnoses: Osteoarthritis of left knee [M17.12]  Primary osteoarthritis of knee [M17.10]     Discharge Date:  24     Post-operative Day:  1 Day Post-Op    Reason for Admission: The patient was admitted for the following: Procedure(s):  LEFT TOTAL KNEE ARTHROPLASTY    BRIEF HOSPITAL COURSE   Christopher Lindsey is a pleasant 73 year old male who underwent the aforementioned procedure with Dr. Pendleton on 3/11. There were no intraoperative complications and the patient was transferred to the recovery room and later the orthopedic unit in stable condition. Once the patient reached the orthopedic floor our orthopedic pain protocol was implemented along with the following:    Anticoagulation Medications: ASA  Therapy: PT and OT  Activity: WBAT  Bracing: None    Consultations during Admission: Hospitalist service for medical management     COMPLICATIONS/SIGNIFICANT FINDINGS    NONE    DISCHARGE INFORMATION   Condition at discharge: Good  Discharge destination: Home  Patient was seen by myself on the date of discharge.    FOLLOW UP CARE   Follow up with orthopedics in 2 weeks or sooner should the need arise. Ortho will continue to manage pain control, post op anticoagulation and incision care.     Follow up with your PCP for management of chronic medical problems and to evaluate for post op medical complications including constipation, nausea/vomiting, DVT/PE, anemia, changes in blood pressure, fevers/chills, urinary retention and atelectasis/pneumonia.     Subjective   Patient is doing well on POD #1. Pain is well controlled with oral medications. Ambulating. Tolerating oral intake.     Physical Exam   /61 (BP Location: Right arm, Patient Position: Chair)   Pulse 86   Temp 98.1  F (36.7  C) (Oral)   Resp 20   Ht 1.753 m (5' 9\")   Wt 109.8 kg (242 lb)   SpO2 93%   BMI 35.74 kg/m  "   The patient is A&Ox3. Appears comfortable, sitting up at bedside.  Sensation is intact to light touch & equal bilaterally in the L2 through S1 dermatomes.  Calves are soft and non-tender.  Dorsiflexion and plantar flexion is intact bilaterally.  Appropriate flexion and extension of the toes bilaterally.   Brisk capillary refill in the toes bilaterally.   Palpable left dorsalis pedis pulse.  left knee dressing C/D/I.      Pertinent Results at Discharge     Hemoglobin   Date/Time Value Ref Range Status   03/12/2024 05:59 AM 11.2 (L) 13.3 - 17.7 g/dL Final   02/19/2024 11:19 AM 12.0 (L) 13.3 - 17.7 g/dL Final   11/17/2023 05:26 AM 13.3 13.3 - 17.7 g/dL Final     Platelet Count   Date/Time Value Ref Range Status   02/19/2024 11:19  150 - 450 10e3/uL Final   11/17/2023 05:26  150 - 450 10e3/uL Final   11/03/2023 08:56  150 - 450 10e3/uL Final       Problem List   Principal Problem:    Primary osteoarthritis of knee      Kisha Blanchard PA-C/Dr. Pendleton  [facility]  [phone]  Date: 3/12/2024  Time: 9:07 AM

## 2024-03-12 NOTE — PROGRESS NOTES
Pt is not appropriate for skilled OT services at this time due to PT deferring OT as Pt has had a taz TKA in the past.  Will defer to PT for Pt's current status.  Plan was discussed with PT.  Will D/C current OT orders. Thank you.    3/12/2024 by Vernell Bo, OTR, OTR/L

## 2024-03-24 ENCOUNTER — APPOINTMENT (OUTPATIENT)
Dept: CT IMAGING | Facility: CLINIC | Age: 74
End: 2024-03-24
Attending: EMERGENCY MEDICINE
Payer: MEDICARE

## 2024-03-24 ENCOUNTER — HOSPITAL ENCOUNTER (EMERGENCY)
Facility: CLINIC | Age: 74
Discharge: HOME | End: 2024-03-24
Attending: EMERGENCY MEDICINE | Admitting: EMERGENCY MEDICINE
Payer: MEDICARE

## 2024-03-24 VITALS
HEIGHT: 69 IN | HEART RATE: 86 BPM | RESPIRATION RATE: 28 BRPM | DIASTOLIC BLOOD PRESSURE: 58 MMHG | BODY MASS INDEX: 33.33 KG/M2 | WEIGHT: 225 LBS | OXYGEN SATURATION: 94 % | TEMPERATURE: 97.9 F | SYSTOLIC BLOOD PRESSURE: 119 MMHG

## 2024-03-24 DIAGNOSIS — I47.10 PAROXYSMAL SUPRAVENTRICULAR TACHYCARDIA (H): ICD-10-CM

## 2024-03-24 LAB
ALBUMIN SERPL BCG-MCNC: 4.1 G/DL (ref 3.5–5.2)
ALP SERPL-CCNC: 135 U/L (ref 40–150)
ALT SERPL W P-5'-P-CCNC: 22 U/L (ref 0–70)
ANION GAP SERPL CALCULATED.3IONS-SCNC: 14 MMOL/L (ref 7–15)
AST SERPL W P-5'-P-CCNC: 41 U/L (ref 0–45)
ATRIAL RATE - MUSE: 55 BPM
ATRIAL RATE - MUSE: 88 BPM
BASOPHILS # BLD AUTO: 0.1 10E3/UL (ref 0–0.2)
BASOPHILS NFR BLD AUTO: 1 %
BILIRUB SERPL-MCNC: 0.5 MG/DL
BUN SERPL-MCNC: 13.1 MG/DL (ref 8–23)
CALCIUM SERPL-MCNC: 10.1 MG/DL (ref 8.8–10.2)
CHLORIDE SERPL-SCNC: 103 MMOL/L (ref 98–107)
CREAT SERPL-MCNC: 0.99 MG/DL (ref 0.67–1.17)
DEPRECATED HCO3 PLAS-SCNC: 24 MMOL/L (ref 22–29)
DIASTOLIC BLOOD PRESSURE - MUSE: 63 MMHG
DIASTOLIC BLOOD PRESSURE - MUSE: NORMAL MMHG
EGFRCR SERPLBLD CKD-EPI 2021: 81 ML/MIN/1.73M2
EOSINOPHIL # BLD AUTO: 0.3 10E3/UL (ref 0–0.7)
EOSINOPHIL NFR BLD AUTO: 3 %
ERYTHROCYTE [DISTWIDTH] IN BLOOD BY AUTOMATED COUNT: 15.8 % (ref 10–15)
GLUCOSE SERPL-MCNC: 126 MG/DL (ref 70–99)
HCT VFR BLD AUTO: 42.2 % (ref 40–53)
HGB BLD-MCNC: 13.3 G/DL (ref 13.3–17.7)
IMM GRANULOCYTES # BLD: 0.1 10E3/UL
IMM GRANULOCYTES NFR BLD: 1 %
INTERPRETATION ECG - MUSE: NORMAL
INTERPRETATION ECG - MUSE: NORMAL
LYMPHOCYTES # BLD AUTO: 2.8 10E3/UL (ref 0.8–5.3)
LYMPHOCYTES NFR BLD AUTO: 22 %
MAGNESIUM SERPL-MCNC: 1.8 MG/DL (ref 1.7–2.3)
MCH RBC QN AUTO: 28.4 PG (ref 26.5–33)
MCHC RBC AUTO-ENTMCNC: 31.5 G/DL (ref 31.5–36.5)
MCV RBC AUTO: 90 FL (ref 78–100)
MONOCYTES # BLD AUTO: 1.3 10E3/UL (ref 0–1.3)
MONOCYTES NFR BLD AUTO: 11 %
NEUTROPHILS # BLD AUTO: 8 10E3/UL (ref 1.6–8.3)
NEUTROPHILS NFR BLD AUTO: 63 %
NRBC # BLD AUTO: 0 10E3/UL
NRBC BLD AUTO-RTO: 0 /100
NT-PROBNP SERPL-MCNC: 82 PG/ML (ref 0–900)
P AXIS - MUSE: 42 DEGREES
P AXIS - MUSE: NORMAL DEGREES
PLATELET # BLD AUTO: 528 10E3/UL (ref 150–450)
POTASSIUM SERPL-SCNC: 4.3 MMOL/L (ref 3.4–5.3)
PR INTERVAL - MUSE: 202 MS
PR INTERVAL - MUSE: NORMAL MS
PROT SERPL-MCNC: 7.8 G/DL (ref 6.4–8.3)
QRS DURATION - MUSE: 106 MS
QRS DURATION - MUSE: 118 MS
QT - MUSE: 302 MS
QT - MUSE: 380 MS
QTC - MUSE: 459 MS
QTC - MUSE: 500 MS
R AXIS - MUSE: 27 DEGREES
R AXIS - MUSE: 28 DEGREES
RBC # BLD AUTO: 4.69 10E6/UL (ref 4.4–5.9)
SODIUM SERPL-SCNC: 141 MMOL/L (ref 135–145)
SYSTOLIC BLOOD PRESSURE - MUSE: 88 MMHG
SYSTOLIC BLOOD PRESSURE - MUSE: NORMAL MMHG
T AXIS - MUSE: 49 DEGREES
T AXIS - MUSE: 56 DEGREES
TROPONIN T SERPL HS-MCNC: 23 NG/L
TROPONIN T SERPL HS-MCNC: 32 NG/L
TSH SERPL DL<=0.005 MIU/L-ACNC: 3.1 UIU/ML (ref 0.3–4.2)
VENTRICULAR RATE- MUSE: 165 BPM
VENTRICULAR RATE- MUSE: 88 BPM
WBC # BLD AUTO: 12.5 10E3/UL (ref 4–11)

## 2024-03-24 PROCEDURE — 84443 ASSAY THYROID STIM HORMONE: CPT | Performed by: EMERGENCY MEDICINE

## 2024-03-24 PROCEDURE — 36415 COLL VENOUS BLD VENIPUNCTURE: CPT | Performed by: EMERGENCY MEDICINE

## 2024-03-24 PROCEDURE — 83735 ASSAY OF MAGNESIUM: CPT | Performed by: EMERGENCY MEDICINE

## 2024-03-24 PROCEDURE — 250N000011 HC RX IP 250 OP 636: Performed by: EMERGENCY MEDICINE

## 2024-03-24 PROCEDURE — 99291 CRITICAL CARE FIRST HOUR: CPT | Mod: 25

## 2024-03-24 PROCEDURE — 85025 COMPLETE CBC W/AUTO DIFF WBC: CPT | Performed by: EMERGENCY MEDICINE

## 2024-03-24 PROCEDURE — 92960 CARDIOVERSION ELECTRIC EXT: CPT

## 2024-03-24 PROCEDURE — 83880 ASSAY OF NATRIURETIC PEPTIDE: CPT | Performed by: EMERGENCY MEDICINE

## 2024-03-24 PROCEDURE — G1010 CDSM STANSON: HCPCS

## 2024-03-24 PROCEDURE — 258N000003 HC RX IP 258 OP 636: Performed by: EMERGENCY MEDICINE

## 2024-03-24 PROCEDURE — 84484 ASSAY OF TROPONIN QUANT: CPT | Performed by: EMERGENCY MEDICINE

## 2024-03-24 PROCEDURE — 93005 ELECTROCARDIOGRAM TRACING: CPT | Mod: XU | Performed by: EMERGENCY MEDICINE

## 2024-03-24 PROCEDURE — 80053 COMPREHEN METABOLIC PANEL: CPT | Performed by: EMERGENCY MEDICINE

## 2024-03-24 PROCEDURE — 99285 EMERGENCY DEPT VISIT HI MDM: CPT | Mod: 25

## 2024-03-24 PROCEDURE — 250N000011 HC RX IP 250 OP 636: Mod: JZ | Performed by: EMERGENCY MEDICINE

## 2024-03-24 RX ORDER — ADENOSINE 3 MG/ML
6 INJECTION, SOLUTION INTRAVENOUS ONCE
Status: COMPLETED | OUTPATIENT
Start: 2024-03-24 | End: 2024-03-24

## 2024-03-24 RX ORDER — DILTIAZEM HYDROCHLORIDE EXTENDED-RELEASE TABLETS 240 MG/1
240 TABLET, EXTENDED RELEASE ORAL DAILY
Qty: 30 TABLET | Refills: 0 | Status: ON HOLD | OUTPATIENT
Start: 2024-03-24 | End: 2024-04-30

## 2024-03-24 RX ORDER — ADENOSINE 3 MG/ML
12 INJECTION, SOLUTION INTRAVENOUS ONCE
Status: COMPLETED | OUTPATIENT
Start: 2024-03-24 | End: 2024-03-24

## 2024-03-24 RX ORDER — IOPAMIDOL 755 MG/ML
90 INJECTION, SOLUTION INTRAVASCULAR ONCE
Status: COMPLETED | OUTPATIENT
Start: 2024-03-24 | End: 2024-03-24

## 2024-03-24 RX ADMIN — SODIUM CHLORIDE 500 ML: 9 INJECTION, SOLUTION INTRAVENOUS at 15:55

## 2024-03-24 RX ADMIN — ADENOSINE 12 MG: 3 INJECTION INTRAVENOUS at 16:08

## 2024-03-24 RX ADMIN — IOPAMIDOL 90 ML: 755 INJECTION, SOLUTION INTRAVENOUS at 16:55

## 2024-03-24 RX ADMIN — SODIUM CHLORIDE 1000 ML: 9 INJECTION, SOLUTION INTRAVENOUS at 16:00

## 2024-03-24 RX ADMIN — ADENOSINE 6 MG: 3 INJECTION INTRAVENOUS at 16:00

## 2024-03-24 ASSESSMENT — ACTIVITIES OF DAILY LIVING (ADL)
ADLS_ACUITY_SCORE: 38

## 2024-03-24 ASSESSMENT — COLUMBIA-SUICIDE SEVERITY RATING SCALE - C-SSRS
1. IN THE PAST MONTH, HAVE YOU WISHED YOU WERE DEAD OR WISHED YOU COULD GO TO SLEEP AND NOT WAKE UP?: NO
2. HAVE YOU ACTUALLY HAD ANY THOUGHTS OF KILLING YOURSELF IN THE PAST MONTH?: NO
6. HAVE YOU EVER DONE ANYTHING, STARTED TO DO ANYTHING, OR PREPARED TO DO ANYTHING TO END YOUR LIFE?: NO

## 2024-03-24 NOTE — ED PROVIDER NOTES
EMERGENCY DEPARTMENT ENCOUNTER      NAME: Christopher Lindsey  AGE: 73 year old male  YOB: 1950  MRN: [id number]  EVALUATION DATE & TIME: 3/24/2024  3:37 PM    PCP: Rashi Nichols    ED PROVIDER: Yary De Jesus MD      Chief Complaint   Patient presents with    Tachycardia         FINAL IMPRESSION:  1. Paroxysmal supraventricular tachycardia          ED COURSE & MEDICAL DECISION MAKING:    Pertinent Labs & Imaging studies reviewed. (See chart for details)    4:00 PM I introduced myself to the patient, obtained patient history, performed a physical exam, and discussed plan for ED workup including potential diagnostic laboratory/imaging studies and interventions.    73 year old male with a pertinent history of diabetes, SVT, HTN, Emphysema, recent left TKA who presents to this ED for evaluation of tachycardia.  Patient presents in SVT.  Confirmed by EKG.  Heart rates in the 150s to 180s.  This is a narrow complex tachycardia with no P waves present on EKG suggestive of SVT which she has had in the past.  We attempted vagal maneuver here without improvement x 2.  Thus did verbally consent the patient for attempting chemical cardioversion with adenosine and he was in agreement with this.  He has had this performed successfully in the past.  2 large-bore IV access was obtained.  Patient was placed on the monitor with crash cart in the room and we did place cardioversion pads on the patient front and back in case cardioversion was needed emergently.  Suction and airway equipment also available.  We did also start IV fluids as he briefly dropped to a systolic blood pressure of around 80 likely related to the SVT itself as well as the fact that he took an extra dose of long-acting Cardizem at home prior to arrival.  We pressure bagged a liter of IV fluids and blood pressure was improving.  Initially started with 6 mg of adenosine however did not initially have a three-way stopcock and tried to rapidly flush  with nursing however did not have any change with this.  Did question whether this reach the heart fast enough but he did state in the past he is has had to have the 12 mg dosing so we did up to 12 mg and reattempt this with three-way stopcock and with this he did convert to normal sinus rhythm successfully.  Heart rates in the 70s to 80s.  Repeat EKG confirms normal sinus rhythm without signs of acute ischemia.  He did recently have a left TKA about 2 weeks ago so he is at risk for the potential of PE as a cause of this.  No obvious asymmetric swelling in the legs or pain on exam.  He states that he is recovering well from this and using oxycodone at home as needed.  As he has high risk for the potential of PE will obtain CT of the chest to rule this out.  He also was found to have a slight leukocytosis of 12.5.  He does drink a bottle of wine daily.  Did have concern for potential pneumonia or infectious cause triggering the SVT as well.  Also SVT would be potentially triggered by his alcohol use which we discussed.  Wanted to evaluate his electrolytes as well.  No sign of infection to the surgical site.  Hemoglobin 13.3.  Magnesium and electrolytes within normal limits.  Glucose 126.  Creatinine within normal limits.  TSH within normal limits.  BNP within normal limits at 82 making pulmonary edema less likely.    Troponin is initially 23 and on repeat 32.  Likely slightly elevated related to the SVT and adenosine.  CT of the chest does not reveal any evidence of PE pneumonia or pulmonary edema or any other acute pathology.  He has remained hemodynamically stable with normal heart rates and blood pressures here.  I did speak with Dr. West with cardiology due to the slightly elevated troponin and he agreed that this would be related to the SVT and adenosine and did not feel that he warranted admission for any further workup of this.  He will ensure that the patient gets close follow-up next week and he did  recommend upping his extended release Cardizem from 180 mg daily to 240 mg daily and the patient was in agreement with this so I wrote a new prescription for him.  I did discuss that he should not take an extra of the 240 as that would be a more significant amount if he went back into the SVT at home.  Discussed that if he went back into this he should return to the ER for reevaluation.  Also discussed he should return if he develops chest pain, shortness of breath, fever, or any new or worsening concerns.  He voiced understanding and was comfortable with this plan.  Rapid access cardiology referral placed and Dr. West stated that he would have his  contact him to be seen next week.  Patient and his wife are in agreement with this plan.  He is feeling well here.  Remains in sinus rhythm.  He was discharged home in stable condition         At the conclusion of the encounter I discussed the results of all of the tests and the disposition. The questions were answered. The patient or family acknowledged understanding and was agreeable with the care plan.     Critical Care  Performed by: Yary De Jesus MD  Authorized by: Yary De Jesus MD     Total critical care time: 30 minutes  Critical care time was exclusive of separately billable procedures and treating other patients.  Critical care was necessary to treat or prevent imminent or life-threatening deterioration of the following conditions: SVT requiring chemical cardioversion  Critical care was time spent personally by me on the following activities: development of treatment plan with patient or surrogate, discussions with consultants, examination of patient, evaluation of patient's response to treatment, obtaining history from patient or surrogate, ordering and performing treatments and interventions, ordering and review of laboratory studies, ordering and review of radiographic studies and re-evaluation of patient's condition, this excludes any  separately billable procedures.        Medical Decision Making  Obtained supplemental history:Supplemental history obtained?: Documented in chart and Family Member/Significant Other  Reviewed external records: External records reviewed?: Documented in chart  Care impacted by chronic illness:Diabetes, Hypertension, and Other: SVT  Care significantly affected by social determinants of health:N/A  Did you consider but not order tests?: Work up considered but not performed and documented in chart, if applicable  Did you interpret images independently?: Independent interpretation of ECG and images noted in documentation, when applicable.  Consultation discussion with other provider:Did you involve another provider (consultant, , pharmacy, etc.)?: I discussed the care with another health care provider, see documentation for details.  Discharge. I prescribed additional prescription strength medication(s) as charted. See documentation for any additional details.        MEDICATIONS GIVEN IN THE EMERGENCY:  Medications   sodium chloride 0.9% BOLUS 500 mL (0 mLs Intravenous Stopped 3/24/24 1600)   adenosine (ADENOCARD) injection 6 mg (6 mg Intravenous $Given 3/24/24 1600)   adenosine (ADENOCARD) injection 12 mg (12 mg Intravenous $Given 3/24/24 1608)   sodium chloride 0.9% BOLUS 1,000 mL (0 mLs Intravenous Stopped 3/24/24 1618)   iopamidol (ISOVUE-370) solution 90 mL (90 mLs Intravenous $Given 3/24/24 1655)       NEW PRESCRIPTIONS STARTED AT TODAY'S ER VISIT  Discharge Medication List as of 3/24/2024  7:41 PM        START taking these medications    Details   diltiazem ER (CARDIAZEM LA) 240 MG 24 hr tablet Take 1 tablet (240 mg) by mouth daily for 30 days, Disp-30 tablet, R-0, E-Prescribe                =================================================================    HPI    Patient information was obtained from: The patient and his wife    Use of : N/A       Christopher Lindsey is a 73 year old male with a  pertinent history of diabetes, SVT, HTN, Emphysema, recent left TKA who presents to this ED for evaluation of tachycardia.    The patient developed a sudden onset of fluttering palpitations around 1300 today while talking with his daughter and has been going in and out of it since. He reports his palpitations feels similar to his past SVT episodes. He reports he has been dealing with his SVT episodes intermittently for the past 6-7 years. He took an extra dose of diltiazem 180 mg ER around 1315 as instructed by his cardiologist for when he has a SVT episode. He reports that he has converted back into normal rhythm after taking an extra dose of diltiazem in the past. He tried some vagal maneuvers without any relief just prior to ER arrival.  The patient reports he has received Adenosine in the past to help him convert back into normal rhythm. His most recent SVT episode was during last summer. He does not have a cardiologist in [place] and reports they recently moved from [place]. He has never been diagnosed with A-fib in the past. He has never required electrical cardioversion.     He reports he had left TKA approximately 2 weeks and is currently on Oxycodone for pain relief. He is wearing compression socks given his chronic leg swelling. Denies any history of DVT/PE. He denies any allergies to any medication. No recent exposure to any viral illness. He has a personal history of COPD and notes his baseline O2 sats runs around 88%. He quit smoking cigarettes in 2014 and has switched to vaping. He does drink daily, approximately 1 bottle of wine per day. He had one glass of wine this morning.     Otherwise, the patient denied having abdominal pain, chest pain, shortness of breath, numbness, tingling, weakness, vomiting, diarrhea and any other medical complaints or concerns at this time.        REVIEW OF SYSTEMS   Review of Systems   Pertinent positives and negatives are documented in the HPI. All other systems  reviewed and are negative.      PAST MEDICAL HISTORY:  Past Medical History:   Diagnosis Date    Arrhythmia     Arthritis     Benign essential hypertension 12/04/2018    Diabetes (H)     Elevated triglycerides with high cholesterol 12/04/2018    Obesity (BMI 35.0-39.9) with comorbidity (H) 12/04/2018    TERESA (obstructive sleep apnea) 12/04/2018    Has BIPAP machine    Other emphysema (H) 12/04/2018    Hx following with pulmonology, [facility]    Patellofemoral disorder of both knees 12/04/2018    Prediabetes 12/04/2018    Recurrent major depressive disorder, in full remission (H24) 12/04/2018    Spinal headache     SVT (supraventricular tachycardia) 12/04/2018    Hx 2 episodes in past year, now on diltiazem for this       PAST SURGICAL HISTORY:  Past Surgical History:   Procedure Laterality Date    ARTHROPLASTY KNEE Right 11/16/2023    Procedure: RIGHT TOTAL KNEE ARTHROPLASTY;  Surgeon: Ajay Pendleton DO;  Location: [facility] Main OR    ARTHROPLASTY KNEE Left 3/11/2024    Procedure: LEFT TOTAL KNEE ARTHROPLASTY;  Surgeon: Ajay Pendleton DO;  Location: [facility] Main OR    HEMORRHOIDECTOMY EXTERNAL  2004    INCISION AND DRAINAGE PERIRECTAL ABSCESS             CURRENT MEDICATIONS:    diltiazem ER (CARDIAZEM LA) 240 MG 24 hr tablet  acetaminophen (TYLENOL) 325 MG tablet  allopurinol (ZYLOPRIM) 100 MG tablet  aspirin 81 MG EC tablet  atorvastatin (LIPITOR) 20 MG tablet  blood glucose (NO BRAND SPECIFIED) lancets standard  blood glucose (NO BRAND SPECIFIED) test strip  blood glucose (ONE TOUCH DELICA) lancing device  blood glucose monitoring (ONETOUCH VERIO) meter device kit  buPROPion (WELLBUTRIN XL) 150 MG 24 hr tablet  diltiazem ER COATED BEADS (CARDIZEM CD/CARTIA XT) 180 MG 24 hr capsule  fenofibrate (TRICOR) 48 MG tablet  FLUoxetine (PROZAC) 20 MG capsule  hydrOXYzine HCl (ATARAX) 10 MG tablet  Lancets (ONETOUCH DELICA PLUS [id number]) MISC  losartan (COZAAR) 50 MG tablet  metFORMIN (GLUCOPHAGE) 500 MG  tablet  naproxen sodium (ANAPROX) 220 MG tablet  OLANZapine (ZYPREXA) 5 MG tablet  oxyCODONE (ROXICODONE) 5 MG tablet  senna-docusate (SENOKOT-S/PERICOLACE) 8.6-50 MG tablet        ALLERGIES:  No Known Allergies    FAMILY HISTORY:  Family History   Problem Relation Age of Onset    Pancreatic Cancer Mother 62.00    Depression Mother     Cancer Father 88.00        gallbladder    Diabetes Sister     Depression Sister     No Known Problems Brother     Heart Failure Maternal Grandmother          early 70s    Cerebrovascular Disease Maternal Grandfather          in 80s    Heart Failure Paternal Grandmother          in 90s    Coronary Artery Disease Paternal Grandfather         late 60s    Heart Failure Paternal Grandfather     No Known Problems Brother        SOCIAL HISTORY:   Social History     Socioeconomic History    Marital status:    Tobacco Use    Smoking status: Some Days     Packs/day: 1.00     Years: 40.00     Additional pack years: 0.00     Total pack years: 40.00     Types: Cigars, Cigarettes     Last attempt to quit: 2014     Years since quitting: 10.2    Smokeless tobacco: Never    Tobacco comments:     Rare cigar and E cig use   Vaping Use    Vaping Use: Every day   Substance and Sexual Activity    Alcohol use: Yes     Alcohol/week: 56.0 standard drinks of alcohol     Types: 56 Glasses of wine per week     Comment: 21-28 per week, will cut back prior to surgery    Drug use: No     Social Determinants of Health     Financial Resource Strain: Low Risk  (10/2/2023)    Financial Resource Strain     Within the past 12 months, have you or your family members you live with been unable to get utilities (heat, electricity) when it was really needed?: No   Food Insecurity: Low Risk  (10/2/2023)    Food Insecurity     Within the past 12 months, did you worry that your food would run out before you got money to buy more?: No     Within the past 12 months, did the food you bought just not last and  "you didn t have money to get more?: No   Transportation Needs: Low Risk  (10/2/2023)    Transportation Needs     Within the past 12 months, has lack of transportation kept you from medical appointments, getting your medicines, non-medical meetings or appointments, work, or from getting things that you need?: No   Interpersonal Safety: Low Risk  (10/2/2023)    Interpersonal Safety     Do you feel physically and emotionally safe where you currently live?: Yes     Within the past 12 months, have you been hit, slapped, kicked or otherwise physically hurt by someone?: No     Within the past 12 months, have you been humiliated or emotionally abused in other ways by your partner or ex-partner?: No   Housing Stability: Low Risk  (10/2/2023)    Housing Stability     Do you have housing? : Yes     Are you worried about losing your housing?: No       VITALS:  /58   Pulse 86   Temp 97.9  F (36.6  C) (Temporal)   Resp 28   Ht 1.753 m (5' 9\")   Wt 102.1 kg (225 lb)   SpO2 94%   BMI 33.23 kg/m      PHYSICAL EXAM    Physical Exam  Constitutional: Well developed, Well nourished, NAD, GCS 15  HENT: Normocephalic, Atraumatic, Bilateral external ears normal, Oropharynx normal, mucous membranes moist, Nose normal. Neck-  Normal range of motion, No tenderness, Supple, No stridor.    Eyes: PERRL, EOMI, Conjunctiva normal, No discharge.   Respiratory: Normal breath sounds, No respiratory distress, No wheezing or crackles, Speaks in full sentences easily.    Cardiovascular: Tachycardic, Regular rhythm,  No murmurs, No rubs, No gallops. 2+ radial pulses bilaterally  GI: Bowel sounds normal, Soft, No tenderness, No masses, No rebound or guarding.   Musculoskeletal: 2+ DP pulses. No notable lower extremity edema. No cyanosis, No clubbing. Good range of motion in all major joints.   Integument: Warm, Dry, No erythema, No rash. No petechiae.   Neurologic: Alert & oriented x 3, 5/5 strength in all 4 extremities bilaterally. Sensation " intact to light touch in all 4 extremities and the face bilaterally. No focal deficits noted.   Psychiatric: Affect normal, Judgment normal, Mood normal. Cooperative.      LAB:  All pertinent labs reviewed and interpreted.  Results for orders placed or performed during the hospital encounter of 03/24/24   CT Chest Pulmonary Embolism w Contrast    Impression    IMPRESSION:  1.  No significant findings on CTA chest.   Comprehensive metabolic panel   Result Value Ref Range    Sodium 141 135 - 145 mmol/L    Potassium 4.3 3.4 - 5.3 mmol/L    Carbon Dioxide (CO2) 24 22 - 29 mmol/L    Anion Gap 14 7 - 15 mmol/L    Urea Nitrogen 13.1 8.0 - 23.0 mg/dL    Creatinine 0.99 0.67 - 1.17 mg/dL    GFR Estimate 81 >60 mL/min/1.73m2    Calcium 10.1 8.8 - 10.2 mg/dL    Chloride 103 98 - 107 mmol/L    Glucose 126 (H) 70 - 99 mg/dL    Alkaline Phosphatase 135 40 - 150 U/L    AST 41 0 - 45 U/L    ALT 22 0 - 70 U/L    Protein Total 7.8 6.4 - 8.3 g/dL    Albumin 4.1 3.5 - 5.2 g/dL    Bilirubin Total 0.5 <=1.2 mg/dL   Result Value Ref Range    Magnesium 1.8 1.7 - 2.3 mg/dL   Result Value Ref Range    Troponin T, High Sensitivity 23 (H) <=22 ng/L   Nt probnp inpatient (BNP)   Result Value Ref Range    N terminal Pro BNP Inpatient 82 0 - 900 pg/mL   TSH with free T4 reflex   Result Value Ref Range    TSH 3.10 0.30 - 4.20 uIU/mL   CBC with platelets and differential   Result Value Ref Range    WBC Count 12.5 (H) 4.0 - 11.0 10e3/uL    RBC Count 4.69 4.40 - 5.90 10e6/uL    Hemoglobin 13.3 13.3 - 17.7 g/dL    Hematocrit 42.2 40.0 - 53.0 %    MCV 90 78 - 100 fL    MCH 28.4 26.5 - 33.0 pg    MCHC 31.5 31.5 - 36.5 g/dL    RDW 15.8 (H) 10.0 - 15.0 %    Platelet Count 528 (H) 150 - 450 10e3/uL    % Neutrophils 63 %    % Lymphocytes 22 %    % Monocytes 11 %    % Eosinophils 3 %    % Basophils 1 %    % Immature Granulocytes 1 %    NRBCs per 100 WBC 0 <1 /100    Absolute Neutrophils 8.0 1.6 - 8.3 10e3/uL    Absolute Lymphocytes 2.8 0.8 - 5.3 10e3/uL     Absolute Monocytes 1.3 0.0 - 1.3 10e3/uL    Absolute Eosinophils 0.3 0.0 - 0.7 10e3/uL    Absolute Basophils 0.1 0.0 - 0.2 10e3/uL    Absolute Immature Granulocytes 0.1 <=0.4 10e3/uL    Absolute NRBCs 0.0 10e3/uL   Result Value Ref Range    Troponin T, High Sensitivity 32 (H) <=22 ng/L   ECG 12-LEAD WITH MUSE (LHE)   Result Value Ref Range    Systolic Blood Pressure  mmHg    Diastolic Blood Pressure  mmHg    Ventricular Rate 165 BPM    Atrial Rate 55 BPM    AZ Interval  ms    QRS Duration 118 ms     ms    QTc 500 ms    P Axis  degrees    R AXIS 27 degrees    T Axis 56 degrees    Interpretation ECG       Supraventricular tachycardia  Incomplete right bundle branch block  Junctional ST depression, probably normal  Borderline ECG  When compared with ECG of 02-OCT-2023 12:09,  Vent. rate has increased BY  98 BPM  Incomplete right bundle branch block is now Present  Confirmed by SEE ED PROVIDER NOTE FOR, ECG INTERPRETATION (4000),  ALMA DELIA HARRIS ([id number]) on 3/24/2024 4:12:55 PM     ECG 12-LEAD WITH MUSE (LHE)   Result Value Ref Range    Systolic Blood Pressure 88 mmHg    Diastolic Blood Pressure 63 mmHg    Ventricular Rate 88 BPM    Atrial Rate 88 BPM    AZ Interval 202 ms    QRS Duration 106 ms     ms    QTc 459 ms    P Axis 42 degrees    R AXIS 28 degrees    T Axis 49 degrees    Interpretation ECG       Sinus rhythm  Incomplete right bundle branch block  Cannot rule out Anterior infarct , age undetermined  Abnormal ECG  When compared with ECG of 24-MAR-2024 15:36,  Vent. rate has decreased BY  77 BPM  ST no longer depressed in Inferior leads  Confirmed by SEE ED PROVIDER NOTE FOR, ECG INTERPRETATION ([id number]),  ALMA DELIA HARRIS ([id number]) on 3/24/2024 4:12:55 PM         RADIOLOGY:  Reviewed all pertinent imaging. Please see official radiology report.  CT Chest Pulmonary Embolism w Contrast   Final Result   IMPRESSION:   1.  No significant findings on CTA chest.          EKG:    Performed at: 03/24/2024 at  16:10    Impression: Sinus rhythm, Incomplete right bundle branch block. No evidence of acute ischemia.     Rate: 88 bpm  Rhythm: Sinus rhythm  Axis: 28  IA Interval: 202 ms  QRS Interval: 106 ms  QTc Interval: 459 ms  ST Changes: ST no longer depressed in inferior leads  Comparison: When compared with ECG of 03/24/2024 at 15:36, Vent. Rate has decreased by 77 bpm.      Performed at: 03/24/2024 at 15:36    Impression: Supraventricular tachycardia, Incomplete right bundle branch block    Rate: 165 bpm  Rhythm: Supraventricular tachycardia  Axis: 27  QRS Interval: 118 ms  QTc Interval: 500 ms  ST Changes: No ST changes  Comparison: When compared with ECG of 10/02/2023 at 12:09, Vent. rate has increased BY 98 BPM, Incomplete right bundle branch block is now Present, SVT now present    I have independently reviewed and interpreted the EKG(s) documented above.    PROCEDURES:   PROCEDURE: Chemical Cardioversion    INDICATIONS: SVT   CARDIOVERSION TYPE: Chemical, Adenosine   CARDIOVERSION PROVIDER: Dr Yary De Jesus   SEDATION: None   CONSENT: Risks, benefits and alternatives were discussed with and Verbal consent was obtained from Patient.   PROCEDURE SPECIFIC CHECKLIST COMPLETED: Yes   TIME OUT: Universal protocol was followed. TIME OUT conducted just prior to starting procedure confirmed patient identity, site/side, procedure, patient position, and availability of correct equipment. Yes.   MEDICATIONS GIVEN: 6 mg Adenosine, IV push, followed by rapid flush and then 12 mg of adenosine IV push followed by rapid flush     MONITORING: Heart rate, cardiac monitor, continuous pulse oximeter, frequent blood pressure checks, level of consciousness checks, IV access, constant attendance by RN until patient is recovered and aconstant attendance by MD until patient is stable   RESPONSE: Vital signs stable, airway patent and O2 saturations remained >92%     Brief pause, then return to Normal Sinus rhythm   COMPLICATIONS: Patient  tolerated procedure well, without complication          [facility] Documentation:   CMS Diagnoses:               I, Arashkalyan Osorio, am serving as a scribe to document services personally performed by Yary De Jesus MD based on my observation and the provider's statements to me. I, Yary De Jesus MD, attest that Arash Osorio is acting in a scribe capacity, has observed my performance of the services and has documented them in accordance with my direction.    Yary De Jesus MD  [facility] EMERGENCY ROOM  [address] [place] [id number]  [phone]     Yary De Jesus MD  03/25/24 1054

## 2024-03-24 NOTE — ED TRIAGE NOTES
Pt presents going in and out of SVT. Has had history of this for last 5 years but has never gone in and out of it like this     Triage Assessment (Adult)       Row Name 03/24/24 1534          Triage Assessment    Airway WDL WDL        Respiratory WDL    Respiratory WDL WDL        Skin Circulation/Temperature WDL    Skin Circulation/Temperature WDL WDL        Cardiac WDL    Cardiac WDL X;rhythm     Pulse Rate & Regularity apical pulse regular;tachycardic     Cardiac Rhythm SVT        Peripheral/Neurovascular WDL    Peripheral Neurovascular WDL WDL        Cognitive/Neuro/Behavioral WDL    Cognitive/Neuro/Behavioral WDL WDL

## 2024-03-25 NOTE — DISCHARGE INSTRUCTIONS
As we discussed, the cardiologist recommends that you switch to 240 mg of the extended release Cardizem daily instead of the 180 mg you are currently taking.  They would like to see you in their office this week and I spoke with Dr. West who stated he would talk with the schedulers to get you on the schedule with him.  Stay well-hydrated.  Alcohol can exacerbate SVT as we discussed.    Return to the ER for any new or worsening concerns.

## 2024-03-25 NOTE — ED NOTES
AIDET performed, white board updated for rounding. Patient updated on plan of care. Patient's pain assessed. Call light within reach, bed in low position, side rails up. Visitor at bedside: wife.

## 2024-03-25 NOTE — PROGRESS NOTES
HEART CARE ENCOUNTER CONSULTATON NOTE      [facility]  [phone]      Assessment/Recommendations   Assessment/Plan:    Christopher Lindsey is a very pleasant 73 year old male with PMH of SVT, hypertension, diabetes melitis, recent left TKA who presents today to the EP clinic.    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  We reviewed SVT mechanisms and reviewed treatment options including electrophysiology study and ablation vs continued medical therapy.  We reviewed the nature of EP studies and ablation for SVT, success rates depending on the specific SVT mechanism, procedural risks (including groin hematoma, tamponade, heart block, stroke) and recovery expectations.  - he would like to proceed with an EP study and possible ablation    2. HTN  - continue current meds    Time spent: 60 minutes spent on the date of the encounter doing chart review, history and exam, documentation and further activities as noted above.    The longitudinal plan of care for the condition(s) below were addressed during this visit. Due to the added complexity in care, I will continue support in the subsequent management of this condition(s) and with the ongoing continuity of care of this condition(s).          History of Present Illness/Subjective    HPI: Christopher Lindsey is a very pleasant 73 year old male with PMH of SVT, hypertension, diabetes melitis, recent left TKA who presents today to the EP clinic.    Christopher had an episode of narrow complex tachycardia with symptoms on 24 March 2024.  This was terminated with 12 mg of adenosine.  He has had episodes like this in the past. ECG review suggests likely AVNRT.    He has had 5 episodes in the last 7 years.    He is a retired pathologist.    Labs below reviewed personally     Physical Examination  Review of Systems   Vitals: /62 (BP Location: Right arm, Patient Position: Sitting, Cuff Size: Adult Large)   Pulse 85   Resp 15   Wt 104.3 kg (230 lb)   BMI 33.97 kg/m     BMI= Body mass index is 33.97 kg/m .  Wt Readings from Last 3 Encounters:   03/26/24 104.3 kg (230 lb)   03/24/24 102.1 kg (225 lb)   03/11/24 109.8 kg (242 lb)       General Appearance:   no distress, normal body habitus   ENT/Mouth: membranes moist, no oral lesions or bleeding gums.      EYES:  no scleral icterus, normal conjunctivae   Neck: no carotid bruits or thyromegaly   Chest/Lungs:   lungs are clear to auscultation, no rales or wheezing, no sternal scar, equal chest wall expansion    Cardiovascular:   Regular. Normal first and second heart sounds with no murmurs, rubs, or gallops; the carotid, radial and posterior tibial pulses are intact, no edema bilaterally    Abdomen:  no organomegaly, masses, bruits, or tenderness; bowel sounds are present   Extremities: no cyanosis or clubbing   Skin: no xanthelasma, warm.    Neurologic: normal  bilateral, no tremors     Psychiatric: alert and oriented x3, calm        Please refer above for cardiac ROS details.        Medical History  Surgical History Family History Social History   Past Medical History:   Diagnosis Date    Arrhythmia     Arthritis     Benign essential hypertension 12/04/2018    Diabetes (H)     Elevated triglycerides with high cholesterol 12/04/2018    Obesity (BMI 35.0-39.9) with comorbidity (H) 12/04/2018    TERESA (obstructive sleep apnea) 12/04/2018    Has BIPAP machine    Other emphysema (H) 12/04/2018    Hx following with pulmonology, [facility]    Patellofemoral disorder of both knees 12/04/2018    Prediabetes 12/04/2018    Recurrent major depressive disorder, in full remission (H24) 12/04/2018    Spinal headache     SVT (supraventricular tachycardia) 12/04/2018    Hx 2 episodes in past year, now on diltiazem for this     Past Surgical History:   Procedure Laterality Date    ARTHROPLASTY KNEE Right 11/16/2023    Procedure: RIGHT TOTAL KNEE ARTHROPLASTY;  Surgeon: Ajay Pendleton DO;  Location: [facility] OR    ARTHROPLASTY KNEE Left  3/11/2024    Procedure: LEFT TOTAL KNEE ARTHROPLASTY;  Surgeon: Ajay Pendleton DO;  Location: [facility] Main OR    HEMORRHOIDECTOMY EXTERNAL  2004    INCISION AND DRAINAGE PERIRECTAL ABSCESS       Family History   Problem Relation Age of Onset    Pancreatic Cancer Mother 62.00    Depression Mother     Cancer Father 88.00        gallbladder    Diabetes Sister     Depression Sister     No Known Problems Brother     Heart Failure Maternal Grandmother          early 70s    Cerebrovascular Disease Maternal Grandfather          in 80s    Heart Failure Paternal Grandmother          in 90s    Coronary Artery Disease Paternal Grandfather         late 60s    Heart Failure Paternal Grandfather     No Known Problems Brother         Social History     Socioeconomic History    Marital status:      Spouse name: Not on file    Number of children: Not on file    Years of education: Not on file    Highest education level: Not on file   Occupational History    Not on file   Tobacco Use    Smoking status: Some Days     Packs/day: 1.00     Years: 40.00     Additional pack years: 0.00     Total pack years: 40.00     Types: Cigars, Cigarettes     Last attempt to quit: 2014     Years since quitting: 10.2    Smokeless tobacco: Never    Tobacco comments:     Rare cigar and E cig use   Vaping Use    Vaping Use: Every day   Substance and Sexual Activity    Alcohol use: Yes     Alcohol/week: 56.0 standard drinks of alcohol     Types: 56 Glasses of wine per week     Comment: 21-28 per week, will cut back prior to surgery    Drug use: No    Sexual activity: Not on file   Other Topics Concern    Not on file   Social History Narrative    Not on file     Social Determinants of Health     Financial Resource Strain: Low Risk  (10/2/2023)    Financial Resource Strain     Within the past 12 months, have you or your family members you live with been unable to get utilities (heat, electricity) when it was really needed?: No   Food  Insecurity: Low Risk  (10/2/2023)    Food Insecurity     Within the past 12 months, did you worry that your food would run out before you got money to buy more?: No     Within the past 12 months, did the food you bought just not last and you didn t have money to get more?: No   Transportation Needs: Low Risk  (10/2/2023)    Transportation Needs     Within the past 12 months, has lack of transportation kept you from medical appointments, getting your medicines, non-medical meetings or appointments, work, or from getting things that you need?: No   Physical Activity: Not on file   Stress: Not on file   Social Connections: Not on file   Interpersonal Safety: Low Risk  (10/2/2023)    Interpersonal Safety     Do you feel physically and emotionally safe where you currently live?: Yes     Within the past 12 months, have you been hit, slapped, kicked or otherwise physically hurt by someone?: No     Within the past 12 months, have you been humiliated or emotionally abused in other ways by your partner or ex-partner?: No   Housing Stability: Low Risk  (10/2/2023)    Housing Stability     Do you have housing? : Yes     Are you worried about losing your housing?: No           Medications  Allergies   Current Outpatient Medications   Medication Sig Dispense Refill    acetaminophen (TYLENOL) 325 MG tablet Take 2 tablets (650 mg) by mouth every 4 hours as needed for other (mild pain) 100 tablet 0    allopurinol (ZYLOPRIM) 100 MG tablet TAKE 1 TABLET BY MOUTH ONCE DAILY 90 tablet 1    aspirin 81 MG EC tablet Take 1 tablet (81 mg) by mouth 2 times daily 60 tablet 0    atorvastatin (LIPITOR) 20 MG tablet TAKE ONE TABLET BY MOUTH EVERY DAY AT BEDTIME 90 tablet 3    blood glucose (NO BRAND SPECIFIED) lancets standard Use to test blood sugar 1 times daily or as directed. 100 each 1    blood glucose (NO BRAND SPECIFIED) test strip Use to test blood sugar 1 times daily or as directed. 100 strip 1    blood glucose (ONE TOUCH DELICA)  lancing device Lancing device to be used with lancets. 1 each 0    blood glucose monitoring (ONETOUCH VERIO) meter device kit Use to test blood sugar 1 times daily or as directed. 1 kit 0    buPROPion (WELLBUTRIN XL) 150 MG 24 hr tablet TAKE ONE TABLET BY MOUTH EVERY DAY 90 tablet 0    diltiazem ER (CARDIAZEM LA) 240 MG 24 hr tablet Take 1 tablet (240 mg) by mouth daily for 30 days 30 tablet 0    diltiazem ER COATED BEADS (CARDIZEM CD/CARTIA XT) 180 MG 24 hr capsule TAKE ONE CAPSULE BY MOUTH EVERY DAY 90 capsule 3    fenofibrate (TRICOR) 48 MG tablet Take 1 tablet (48 mg) by mouth daily 90 tablet 3    FLUoxetine (PROZAC) 20 MG capsule TAKE ONE CAPSULE BY MOUTH EVERY DAY 90 capsule 1    hydrOXYzine HCl (ATARAX) 10 MG tablet Take 1 tablet (10 mg) by mouth every 6 hours as needed for itching or anxiety (with pain, moderate pain) 30 tablet 0    Lancets (ONETOUCH DELICA PLUS [id number]) MISC Inject 1 Lancet Subcutaneous daily      losartan (COZAAR) 50 MG tablet TAKE ONE TABLET BY MOUTH EVERY DAY 90 tablet 0    metFORMIN (GLUCOPHAGE) 500 MG tablet Take 1 tablet (500 mg) by mouth 2 times daily (with meals) 180 tablet 1    naproxen sodium (ANAPROX) 220 MG tablet Take 220 mg by mouth daily      OLANZapine (ZYPREXA) 5 MG tablet TAKE TWO TABLETS BY MOUTH EVERY  tablet 1    oxyCODONE (ROXICODONE) 5 MG tablet Take 1-2 tablets (5-10 mg) by mouth every 4 hours as needed for moderate to severe pain 26 tablet 0    senna-docusate (SENOKOT-S/PERICOLACE) 8.6-50 MG tablet Take 1-2 tablets by mouth 2 times daily Take while on oral narcotics to prevent or treat constipation. 60 tablet 0     No Known Allergies       Lab Results    Chemistry/lipid CBC Cardiac Enzymes/BNP/TSH/INR   Recent Labs   Lab Test 09/11/23  1324   CHOL 165   HDL 58   LDL 83   TRIG 120     Recent Labs   Lab Test 09/11/23  1324 09/24/21  1119 05/19/21  1220   LDL 83 91 82     Recent Labs   Lab Test 03/24/24  1553      POTASSIUM 4.3   CHLORIDE 103   CO2 24  "  *   BUN 13.1   CR 0.99   GFRESTIMATED 81   YOLA 10.1     Recent Labs   Lab Test 03/24/24  1553 03/12/24  0600 02/19/24  1119   CR 0.99 0.80 0.66*     Recent Labs   Lab Test 02/19/24  1119 09/11/23  1324 03/02/23  0942   A1C 5.8* 6.8* 6.5*          Recent Labs   Lab Test 03/24/24  1553   WBC 12.5*   HGB 13.3   HCT 42.2   MCV 90   *     Recent Labs   Lab Test 03/24/24  1553 03/12/24  0559 02/19/24  1119   HGB 13.3 11.2* 12.0*    No results for input(s): \"TROPONINI\" in the last 30174 hours.  Recent Labs   Lab Test 03/24/24  1553 03/02/23  0942   NTBNPI 82  --    NTBNP  --  8     Recent Labs   Lab Test 03/24/24  1553   TSH 3.10     No results for input(s): \"INR\" in the last 91611 hours.     Merlin Pacheco MD                                      "

## 2024-03-26 ENCOUNTER — DOCUMENTATION ONLY (OUTPATIENT)
Dept: CARDIOLOGY | Facility: CLINIC | Age: 74
End: 2024-03-26

## 2024-03-26 ENCOUNTER — OFFICE VISIT (OUTPATIENT)
Dept: CARDIOLOGY | Facility: CLINIC | Age: 74
End: 2024-03-26
Attending: EMERGENCY MEDICINE
Payer: MEDICARE

## 2024-03-26 VITALS
RESPIRATION RATE: 15 BRPM | BODY MASS INDEX: 33.97 KG/M2 | DIASTOLIC BLOOD PRESSURE: 62 MMHG | HEART RATE: 85 BPM | WEIGHT: 230 LBS | SYSTOLIC BLOOD PRESSURE: 120 MMHG

## 2024-03-26 DIAGNOSIS — I47.10 PAROXYSMAL SUPRAVENTRICULAR TACHYCARDIA (H): Primary | ICD-10-CM

## 2024-03-26 DIAGNOSIS — I47.10 PAROXYSMAL SUPRAVENTRICULAR TACHYCARDIA (H): ICD-10-CM

## 2024-03-26 PROCEDURE — 99205 OFFICE O/P NEW HI 60 MIN: CPT | Performed by: INTERNAL MEDICINE

## 2024-03-26 PROCEDURE — G2211 COMPLEX E/M VISIT ADD ON: HCPCS | Performed by: INTERNAL MEDICINE

## 2024-03-26 RX ORDER — SODIUM CHLORIDE 9 MG/ML
100 INJECTION, SOLUTION INTRAVENOUS CONTINUOUS
Status: CANCELLED | OUTPATIENT
Start: 2024-04-30

## 2024-03-26 RX ORDER — LIDOCAINE 40 MG/G
CREAM TOPICAL
Status: CANCELLED | OUTPATIENT
Start: 2024-03-26

## 2024-03-26 RX ORDER — FENTANYL CITRATE 50 UG/ML
25 INJECTION, SOLUTION INTRAMUSCULAR; INTRAVENOUS
Status: CANCELLED | OUTPATIENT
Start: 2024-04-30

## 2024-03-26 NOTE — PATIENT INSTRUCTIONS
[facility]  Cardiac Electrophysiology  [address]  [place] [address]   Office: [phone]  Fax: [phone]       Thank you for seeing us in clinic today - it is a pleasure to be a part of your care team.  Below is a summary of our plan from today's visit.      We will schedule you for an ablation  Please get an echocardiogram in the next two weeks  Continue all current meds    Please do not hesitate to be in touch with our office at [phone] with any questions that may arise.      Thank you for trusting us with your care,    Merlin Pacheco MD  Clinical Cardiac Electrophysiology  [facility]  [address]  [place] [address]   Office: [phone]  Fax: [phone]

## 2024-03-26 NOTE — LETTER
3/26/2024    Rashi Nichols MD, MD  [address]  [facility] [address]    RE: Christopher Lindsey       Dear Colleague,     I had the pleasure of seeing Christopher Lindsey in the [facility].    HEART CARE ENCOUNTER CONSULTATON NOTE      [facility]  [phone]      Assessment/Recommendations   Assessment/Plan:    Christopher Lindsey is a very pleasant 73 year old male with PMH of SVT, hypertension, diabetes melitis, recent left TKA who presents today to the EP clinic.    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  We reviewed SVT mechanisms and reviewed treatment options including electrophysiology study and ablation vs continued medical therapy.  We reviewed the nature of EP studies and ablation for SVT, success rates depending on the specific SVT mechanism, procedural risks (including groin hematoma, tamponade, heart block, stroke) and recovery expectations.  - he would like to proceed with an EP study and possible ablation    2. HTN  - continue current meds    Time spent: 60 minutes spent on the date of the encounter doing chart review, history and exam, documentation and further activities as noted above.    The longitudinal plan of care for the condition(s) below were addressed during this visit. Due to the added complexity in care, I will continue support in the subsequent management of this condition(s) and with the ongoing continuity of care of this condition(s).          History of Present Illness/Subjective    HPI: Christopher Lindsey is a very pleasant 73 year old male with PMH of SVT, hypertension, diabetes melitis, recent left TKA who presents today to the EP clinic.    Christopher had an episode of narrow complex tachycardia with symptoms on 24 March 2024.  This was terminated with 12 mg of adenosine.  He has had episodes like this in the past. ECG review suggests likely AVNRT.    He has had 5 episodes in the last 7 years.    He is a retired pathologist.    Labs below reviewed  personally     Physical Examination  Review of Systems   Vitals: /62 (BP Location: Right arm, Patient Position: Sitting, Cuff Size: Adult Large)   Pulse 85   Resp 15   Wt 104.3 kg (230 lb)   BMI 33.97 kg/m    BMI= Body mass index is 33.97 kg/m .  Wt Readings from Last 3 Encounters:   03/26/24 104.3 kg (230 lb)   03/24/24 102.1 kg (225 lb)   03/11/24 109.8 kg (242 lb)       General Appearance:   no distress, normal body habitus   ENT/Mouth: membranes moist, no oral lesions or bleeding gums.      EYES:  no scleral icterus, normal conjunctivae   Neck: no carotid bruits or thyromegaly   Chest/Lungs:   lungs are clear to auscultation, no rales or wheezing, no sternal scar, equal chest wall expansion    Cardiovascular:   Regular. Normal first and second heart sounds with no murmurs, rubs, or gallops; the carotid, radial and posterior tibial pulses are intact, no edema bilaterally    Abdomen:  no organomegaly, masses, bruits, or tenderness; bowel sounds are present   Extremities: no cyanosis or clubbing   Skin: no xanthelasma, warm.    Neurologic: normal  bilateral, no tremors     Psychiatric: alert and oriented x3, calm        Please refer above for cardiac ROS details.        Medical History  Surgical History Family History Social History   Past Medical History:   Diagnosis Date    Arrhythmia     Arthritis     Benign essential hypertension 12/04/2018    Diabetes (H)     Elevated triglycerides with high cholesterol 12/04/2018    Obesity (BMI 35.0-39.9) with comorbidity (H) 12/04/2018    TERESA (obstructive sleep apnea) 12/04/2018    Has BIPAP machine    Other emphysema (H) 12/04/2018    Hx following with pulmonology, [facility]    Patellofemoral disorder of both knees 12/04/2018    Prediabetes 12/04/2018    Recurrent major depressive disorder, in full remission (H24) 12/04/2018    Spinal headache     SVT (supraventricular tachycardia) 12/04/2018    Hx 2 episodes in past year, now on diltiazem for this     Past  Surgical History:   Procedure Laterality Date    ARTHROPLASTY KNEE Right 2023    Procedure: RIGHT TOTAL KNEE ARTHROPLASTY;  Surgeon: Ajay Pendleton DO;  Location: [facility] OR    ARTHROPLASTY KNEE Left 3/11/2024    Procedure: LEFT TOTAL KNEE ARTHROPLASTY;  Surgeon: Ajay Pendleton DO;  Location: [facility] OR    HEMORRHOIDECTOMY EXTERNAL  2004    INCISION AND DRAINAGE PERIRECTAL ABSCESS       Family History   Problem Relation Age of Onset    Pancreatic Cancer Mother 62.00    Depression Mother     Cancer Father 88.00        gallbladder    Diabetes Sister     Depression Sister     No Known Problems Brother     Heart Failure Maternal Grandmother          early 70s    Cerebrovascular Disease Maternal Grandfather          in 80s    Heart Failure Paternal Grandmother          in 90s    Coronary Artery Disease Paternal Grandfather         late 60s    Heart Failure Paternal Grandfather     No Known Problems Brother         Social History     Socioeconomic History    Marital status:      Spouse name: Not on file    Number of children: Not on file    Years of education: Not on file    Highest education level: Not on file   Occupational History    Not on file   Tobacco Use    Smoking status: Some Days     Packs/day: 1.00     Years: 40.00     Additional pack years: 0.00     Total pack years: 40.00     Types: Cigars, Cigarettes     Last attempt to quit: 2014     Years since quitting: 10.2    Smokeless tobacco: Never    Tobacco comments:     Rare cigar and E cig use   Vaping Use    Vaping Use: Every day   Substance and Sexual Activity    Alcohol use: Yes     Alcohol/week: 56.0 standard drinks of alcohol     Types: 56 Glasses of wine per week     Comment: 21-28 per week, will cut back prior to surgery    Drug use: No    Sexual activity: Not on file   Other Topics Concern    Not on file   Social History Narrative    Not on file     Social Determinants of Health     Financial Resource Strain:  Low Risk  (10/2/2023)    Financial Resource Strain     Within the past 12 months, have you or your family members you live with been unable to get utilities (heat, electricity) when it was really needed?: No   Food Insecurity: Low Risk  (10/2/2023)    Food Insecurity     Within the past 12 months, did you worry that your food would run out before you got money to buy more?: No     Within the past 12 months, did the food you bought just not last and you didn t have money to get more?: No   Transportation Needs: Low Risk  (10/2/2023)    Transportation Needs     Within the past 12 months, has lack of transportation kept you from medical appointments, getting your medicines, non-medical meetings or appointments, work, or from getting things that you need?: No   Physical Activity: Not on file   Stress: Not on file   Social Connections: Not on file   Interpersonal Safety: Low Risk  (10/2/2023)    Interpersonal Safety     Do you feel physically and emotionally safe where you currently live?: Yes     Within the past 12 months, have you been hit, slapped, kicked or otherwise physically hurt by someone?: No     Within the past 12 months, have you been humiliated or emotionally abused in other ways by your partner or ex-partner?: No   Housing Stability: Low Risk  (10/2/2023)    Housing Stability     Do you have housing? : Yes     Are you worried about losing your housing?: No           Medications  Allergies   Current Outpatient Medications   Medication Sig Dispense Refill    acetaminophen (TYLENOL) 325 MG tablet Take 2 tablets (650 mg) by mouth every 4 hours as needed for other (mild pain) 100 tablet 0    allopurinol (ZYLOPRIM) 100 MG tablet TAKE 1 TABLET BY MOUTH ONCE DAILY 90 tablet 1    aspirin 81 MG EC tablet Take 1 tablet (81 mg) by mouth 2 times daily 60 tablet 0    atorvastatin (LIPITOR) 20 MG tablet TAKE ONE TABLET BY MOUTH EVERY DAY AT BEDTIME 90 tablet 3    blood glucose (NO BRAND SPECIFIED) lancets standard Use to  test blood sugar 1 times daily or as directed. 100 each 1    blood glucose (NO BRAND SPECIFIED) test strip Use to test blood sugar 1 times daily or as directed. 100 strip 1    blood glucose (ONE TOUCH DELICA) lancing device Lancing device to be used with lancets. 1 each 0    blood glucose monitoring (ONETOUCH VERIO) meter device kit Use to test blood sugar 1 times daily or as directed. 1 kit 0    buPROPion (WELLBUTRIN XL) 150 MG 24 hr tablet TAKE ONE TABLET BY MOUTH EVERY DAY 90 tablet 0    diltiazem ER (CARDIAZEM LA) 240 MG 24 hr tablet Take 1 tablet (240 mg) by mouth daily for 30 days 30 tablet 0    diltiazem ER COATED BEADS (CARDIZEM CD/CARTIA XT) 180 MG 24 hr capsule TAKE ONE CAPSULE BY MOUTH EVERY DAY 90 capsule 3    fenofibrate (TRICOR) 48 MG tablet Take 1 tablet (48 mg) by mouth daily 90 tablet 3    FLUoxetine (PROZAC) 20 MG capsule TAKE ONE CAPSULE BY MOUTH EVERY DAY 90 capsule 1    hydrOXYzine HCl (ATARAX) 10 MG tablet Take 1 tablet (10 mg) by mouth every 6 hours as needed for itching or anxiety (with pain, moderate pain) 30 tablet 0    Lancets (ONETOUCH DELICA PLUS [id number]) MISC Inject 1 Lancet Subcutaneous daily      losartan (COZAAR) 50 MG tablet TAKE ONE TABLET BY MOUTH EVERY DAY 90 tablet 0    metFORMIN (GLUCOPHAGE) 500 MG tablet Take 1 tablet (500 mg) by mouth 2 times daily (with meals) 180 tablet 1    naproxen sodium (ANAPROX) 220 MG tablet Take 220 mg by mouth daily      OLANZapine (ZYPREXA) 5 MG tablet TAKE TWO TABLETS BY MOUTH EVERY  tablet 1    oxyCODONE (ROXICODONE) 5 MG tablet Take 1-2 tablets (5-10 mg) by mouth every 4 hours as needed for moderate to severe pain 26 tablet 0    senna-docusate (SENOKOT-S/PERICOLACE) 8.6-50 MG tablet Take 1-2 tablets by mouth 2 times daily Take while on oral narcotics to prevent or treat constipation. 60 tablet 0     No Known Allergies       Lab Results    Chemistry/lipid CBC Cardiac Enzymes/BNP/TSH/INR   Recent Labs   Lab Test 09/11/23  1324   CHOL 165  "  HDL 58   LDL 83   TRIG 120     Recent Labs   Lab Test 09/11/23  1324 09/24/21  1119 05/19/21  1220   LDL 83 91 82     Recent Labs   Lab Test 03/24/24  1553      POTASSIUM 4.3   CHLORIDE 103   CO2 24   *   BUN 13.1   CR 0.99   GFRESTIMATED 81   YOLA 10.1     Recent Labs   Lab Test 03/24/24  1553 03/12/24  0600 02/19/24  1119   CR 0.99 0.80 0.66*     Recent Labs   Lab Test 02/19/24  1119 09/11/23  1324 03/02/23  0942   A1C 5.8* 6.8* 6.5*          Recent Labs   Lab Test 03/24/24  1553   WBC 12.5*   HGB 13.3   HCT 42.2   MCV 90   *     Recent Labs   Lab Test 03/24/24  1553 03/12/24  0559 02/19/24  1119   HGB 13.3 11.2* 12.0*    No results for input(s): \"TROPONINI\" in the last 44506 hours.  Recent Labs   Lab Test 03/24/24  1553 03/02/23  0942   NTBNPI 82  --    NTBNP  --  8     Recent Labs   Lab Test 03/24/24  1553   TSH 3.10     No results for input(s): \"INR\" in the last 91167 hours.     Merlin Pacheco MD                Thank you for allowing me to participate in the care of your patient.      Sincerely,     Merlin Pacheco MD     [facility]  cc:   Yary De Jesus MD  [address] [place] [id number]      "

## 2024-03-26 NOTE — PROGRESS NOTES
H&P:  PMD: []  Date: Card OV: [x]  Date: KA 3/26 Orders: I [x] P  [x]      AC: None-   na AAD: Diltiazem-Hold 5 days  Diuretics: None  DM Meds:  metformin  GLP-1:None     Christopher Lindsey, 1950, [id number]  Home:[phone] (home) Cell:[phone] (mobile)  Emergency Contact: Surya Lindsey   PCP: Rashi Nichols, [phone]    Important patient information for CSC/Cath Lab staff : None    [facility] EP Cath Lab Procedure Order   Ablation Type:Supraventricular Tachycardia  Ordering Provider: Dr Pacheco  Date Ordered and Prepped: 3/26/2024 Nadia Farrar RN  Anticipated Case Duration:  Standard ( Case per day SA 2:1, DW 4:1, KA 3:1)   Scheduling Timeframe:  Next Available  Scheduling Restrictions: None  Scheduling Contact: Please contact pt to schedule, if you are unable to schedule date within the next 24 hours please contact pt to update on scheduling process  EP RN Follow Up Apt: Dr Kevin or Dr Garcia Ablations, do NOT need RN PC follow-up post procedure. Dr Pacheco's PVC/VT ablations need 3-4 day EP RN PC post procedure.  Cardiology Follow Up Apt s/p: AVN-General Card @ 6mo + if EF <50 or dx of HF HF GAYLE at time of device PO or if EF >50% no dx of HF EP GAYLE at time of device PO, SVT/AFL- EP GAYLE @ 6 wks and General Card @ 6mo, VT- EP MD @ 6 wks, PVC- Dr Garcia 1 wk MCTO/Zio @ 4 wks and Dr Pacheco 24 hr Holter @ 4 wks with EP MD @ wks for both  Current Device/Device Co Needed for Procedure: None NoneNone  Pre-Procedural Testing needed: Echo  Mapping System Required:  PAM/Ensite/Fernandez (Junior Kevin)  ICE Needed:  No  Anesthesia:  MAC- Monitored Anesthesia Care    [facility] EP Cath Lab Prep   H&P:  Compled by cardiology on 3/26 if scheduled within 30 days, pt to schedule with PMD if procedure outside of this timeframe  Pre-Procedure Labs/T&S: For VT & PVC Ablations only schedule lab visit at [facility] lab within 3 days prior to procedure for T&S, BMP, CBC, HcG is appropriate, and INR if on warfarin. All  other ablations pre-procedure lab work will be done the morning of the procedure.  Medical Records Pertinent for Procedure:   echo pending  Iodinated Contrast Dye Allergies (Does not include Shellfish, Egg, and/or Iodine Allergy): None  GLP-1 Protocol: If on Dulaglutide (Trulicity) (weekly)- Injection hold 7 days prior to procedure  , Exenatide extended release (Bydureon bcise) (weekly)- Injection hold 7 days prior to procedure, Exenatide (Byetta) (twice daily)- Oral Tablet hold day prior and morning of procedure and for Injection hold 7 days prior to procedure, Semaglutide (Ozempic) (weekly)- Injection and Oral hold 7 days prior to procedure, Liraglutide (Victoza, Saxenda) (daily)- Injection hold day prior and morning of procedure  Follow Up S/P: AFL and SVT Ablation EP GAYLE apt at 6 wk (CV lab  to schedule at time of case scheduling), VT Ablation 3-4 day EP RN PC Visit & EP MD apt at 6 wk (CV lab  to schedule at time of case scheduling), PVC Ablation 3-4 day EP RN PV Visit & 24hr Holter at 4 wks for KA and 1 wk ZIO at 4 wks for DW with EP MD apt at 6 wks (CV lab  to schedule at time of case scheduling).    No Known Allergies    Current Outpatient Medications:     acetaminophen (TYLENOL) 325 MG tablet, Take 2 tablets (650 mg) by mouth every 4 hours as needed for other (mild pain), Disp: 100 tablet, Rfl: 0    allopurinol (ZYLOPRIM) 100 MG tablet, TAKE 1 TABLET BY MOUTH ONCE DAILY, Disp: 90 tablet, Rfl: 1    aspirin 81 MG EC tablet, Take 1 tablet (81 mg) by mouth 2 times daily, Disp: 60 tablet, Rfl: 0    atorvastatin (LIPITOR) 20 MG tablet, TAKE ONE TABLET BY MOUTH EVERY DAY AT BEDTIME, Disp: 90 tablet, Rfl: 3    blood glucose (NO BRAND SPECIFIED) lancets standard, Use to test blood sugar 1 times daily or as directed., Disp: 100 each, Rfl: 1    blood glucose (NO BRAND SPECIFIED) test strip, Use to test blood sugar 1 times daily or as directed., Disp: 100 strip, Rfl: 1    blood glucose (ONE  TOUCH DELICA) lancing device, Lancing device to be used with lancets., Disp: 1 each, Rfl: 0    blood glucose monitoring (ONETOUCH VERIO) meter device kit, Use to test blood sugar 1 times daily or as directed., Disp: 1 kit, Rfl: 0    buPROPion (WELLBUTRIN XL) 150 MG 24 hr tablet, TAKE ONE TABLET BY MOUTH EVERY DAY, Disp: 90 tablet, Rfl: 0    diltiazem ER (CARDIAZEM LA) 240 MG 24 hr tablet, Take 1 tablet (240 mg) by mouth daily for 30 days, Disp: 30 tablet, Rfl: 0    diltiazem ER COATED BEADS (CARDIZEM CD/CARTIA XT) 180 MG 24 hr capsule, TAKE ONE CAPSULE BY MOUTH EVERY DAY, Disp: 90 capsule, Rfl: 3    fenofibrate (TRICOR) 48 MG tablet, Take 1 tablet (48 mg) by mouth daily, Disp: 90 tablet, Rfl: 3    FLUoxetine (PROZAC) 20 MG capsule, TAKE ONE CAPSULE BY MOUTH EVERY DAY, Disp: 90 capsule, Rfl: 1    hydrOXYzine HCl (ATARAX) 10 MG tablet, Take 1 tablet (10 mg) by mouth every 6 hours as needed for itching or anxiety (with pain, moderate pain), Disp: 30 tablet, Rfl: 0    Lancets (ONETOUCH DELICA PLUS [id number]) MISC, Inject 1 Lancet Subcutaneous daily, Disp: , Rfl:     losartan (COZAAR) 50 MG tablet, TAKE ONE TABLET BY MOUTH EVERY DAY, Disp: 90 tablet, Rfl: 0    metFORMIN (GLUCOPHAGE) 500 MG tablet, Take 1 tablet (500 mg) by mouth 2 times daily (with meals), Disp: 180 tablet, Rfl: 1    naproxen sodium (ANAPROX) 220 MG tablet, Take 220 mg by mouth daily, Disp: , Rfl:     OLANZapine (ZYPREXA) 5 MG tablet, TAKE TWO TABLETS BY MOUTH EVERY DAY, Disp: 180 tablet, Rfl: 1    oxyCODONE (ROXICODONE) 5 MG tablet, Take 1-2 tablets (5-10 mg) by mouth every 4 hours as needed for moderate to severe pain, Disp: 26 tablet, Rfl: 0    senna-docusate (SENOKOT-S/PERICOLACE) 8.6-50 MG tablet, Take 1-2 tablets by mouth 2 times daily Take while on oral narcotics to prevent or treat constipation., Disp: 60 tablet, Rfl: 0    Documentation Date:3/26/2024 2:21 PM  Nadia Farrar RN

## 2024-03-27 DIAGNOSIS — I10 BENIGN ESSENTIAL HYPERTENSION: ICD-10-CM

## 2024-03-27 RX ORDER — LOSARTAN POTASSIUM 50 MG/1
TABLET ORAL
Qty: 90 TABLET | Refills: 2 | Status: SHIPPED | OUTPATIENT
Start: 2024-03-27

## 2024-03-29 DIAGNOSIS — E78.2 ELEVATED TRIGLYCERIDES WITH HIGH CHOLESTEROL: ICD-10-CM

## 2024-03-29 RX ORDER — FENOFIBRATE 48 MG/1
48 TABLET, COATED ORAL DAILY
Qty: 90 TABLET | Refills: 3 | Status: SHIPPED | OUTPATIENT
Start: 2024-03-29

## 2024-04-09 ENCOUNTER — HOSPITAL ENCOUNTER (OUTPATIENT)
Dept: CARDIOLOGY | Facility: CLINIC | Age: 74
Discharge: HOME | End: 2024-04-09
Attending: INTERNAL MEDICINE | Admitting: INTERNAL MEDICINE
Payer: MEDICARE

## 2024-04-09 DIAGNOSIS — I47.10 PAROXYSMAL SUPRAVENTRICULAR TACHYCARDIA (H): ICD-10-CM

## 2024-04-09 LAB — LVEF ECHO: NORMAL

## 2024-04-09 PROCEDURE — 93306 TTE W/DOPPLER COMPLETE: CPT | Mod: 26 | Performed by: INTERNAL MEDICINE

## 2024-04-09 PROCEDURE — 93306 TTE W/DOPPLER COMPLETE: CPT

## 2024-04-15 ENCOUNTER — OFFICE VISIT (OUTPATIENT)
Dept: FAMILY MEDICINE | Facility: CLINIC | Age: 74
End: 2024-04-15
Payer: MEDICARE

## 2024-04-15 VITALS
HEART RATE: 85 BPM | HEIGHT: 69 IN | OXYGEN SATURATION: 95 % | SYSTOLIC BLOOD PRESSURE: 124 MMHG | BODY MASS INDEX: 32.88 KG/M2 | DIASTOLIC BLOOD PRESSURE: 68 MMHG | WEIGHT: 222 LBS | RESPIRATION RATE: 18 BRPM | TEMPERATURE: 97.5 F

## 2024-04-15 DIAGNOSIS — Z01.818 PREOP GENERAL PHYSICAL EXAM: Primary | ICD-10-CM

## 2024-04-15 DIAGNOSIS — E11.9 TYPE 2 DIABETES MELLITUS WITHOUT COMPLICATION, WITHOUT LONG-TERM CURRENT USE OF INSULIN (H): ICD-10-CM

## 2024-04-15 DIAGNOSIS — I10 BENIGN ESSENTIAL HYPERTENSION: ICD-10-CM

## 2024-04-15 DIAGNOSIS — Z87.39 HISTORY OF GOUT: ICD-10-CM

## 2024-04-15 DIAGNOSIS — I47.10 PAROXYSMAL SUPRAVENTRICULAR TACHYCARDIA (H): ICD-10-CM

## 2024-04-15 DIAGNOSIS — G47.33 OSA (OBSTRUCTIVE SLEEP APNEA): ICD-10-CM

## 2024-04-15 PROCEDURE — 99214 OFFICE O/P EST MOD 30 MIN: CPT | Performed by: FAMILY MEDICINE

## 2024-04-15 ASSESSMENT — PAIN SCALES - GENERAL: PAINLEVEL: MODERATE PAIN (5)

## 2024-04-15 NOTE — PROGRESS NOTES
Preoperative Evaluation  [facility]  [address]  [facility] [address]  Phone: [phone]  Fax: [phone]  Primary Provider: Rashi Muro  Pre-op Performing Provider: RASHI MURO  Apr 15, 2024       Christopher is a 73 year old, presenting for the following:  Recheck Medication and Pre-Op Exam        4/15/2024    11:08 AM   Additional Questions   Roomed by am [facility]         4/15/2024   Forms   Any forms needing to be completed Yes     Surgical Information  Surgery/Procedure: Ablation Supraventricular Tachycardia   Surgery Location: [facility]  Surgeon: Dr. Pacheco  Surgery Date: 4/30/24  Time of Surgery: 10 am (7 am)  Where patient plans to recover: At home with family  Fax number for surgical facility: Note does not need to be faxed, will be available electronically in Epic.    Assessment & Plan     The proposed surgical procedure is considered INTERMEDIATE risk.    Christopher was seen today for recheck medication and pre-op exam.    Diagnoses and all orders for this visit:    Preop general physical exam    Paroxysmal supraventricular tachycardia (H24)    Benign essential hypertension    Type 2 diabetes mellitus without complication, without long-term current use of insulin (H)    History of gout    TERESA (obstructive sleep apnea)           - No identified additional risk factors other than previously addressed    Antiplatelet or Anticoagulation Medication Instructions   - Patient is on no antiplatelet or anticoagulation medications.    Additional Medication Instructions  Patient is to take all scheduled medications on the day of surgery EXCEPT for modifications listed below:    Hold metformin and losartan the day of procedure only.    Recommendation  APPROVAL GIVEN to proceed with proposed procedure, without further diagnostic evaluation.    Subjective       Via the Health Maintenance questionnaire, the patient has reported the following services have been completed -Eye Exam,  this information has been sent to the abstraction team.    HPI related to upcoming procedure:     He has had symptomatic supraventricular tachycardia and is scheduled for intervention.  He has had approximately 5 episodes in the past 7 years with the most recent episode in March 2024 resulting in the emergency department visit with use of adenosine.    He has type 2 diabetes his A1c was obtained in February was 5.8 demonstrating good control.  He is on metformin.    Blood pressure is well-controlled with current agents.  He is on a statin for vascular disease risk reduction and has taken an aspirin currently for overall vascular disease risk reduction.  He does not have any prior history of vascular disease.  He has had normal renal function.  He has a prior history of gout without any recent attacks.    He has a history of obstructive sleep apnea he was placed on BiPAP but was intolerant of treatment and is currently untreated.    He recently underwent right and left arthroplasty within the past year.  He continues to recover from his most recent left knee arthroplasty which was performed in March 2024.                4/10/2024    10:42 AM   Preop Questions   1. Have you ever had a heart attack or stroke? No   2. Have you ever had surgery on your heart or blood vessels, such as a stent placement, a coronary artery bypass, or surgery on an artery in your head, neck, heart, or legs? No   3. Do you have chest pain with activity? No   4. Do you have a history of  heart failure? No   5. Do you currently have a cold, bronchitis or symptoms of other infection? No   6. Do you have a cough, shortness of breath, or wheezing? No   7. Do you or anyone in your family have previous history of blood clots? No   8. Do you or does anyone in your family have a serious bleeding problem such as prolonged bleeding following surgeries or cuts? No   9. Have you ever had problems with anemia or been told to take iron pills? No   10. Have  you had any abnormal blood loss such as black, tarry or bloody stools? No   11. Have you ever had a blood transfusion? No   12. Are you willing to have a blood transfusion if it is medically needed before, during, or after your surgery? Yes   13. Have you or any of your relatives ever had problems with anesthesia? No   14. Do you have sleep apnea, excessive snoring or daytime drowsiness? YES -    14a. Do you have a CPAP machine? No   15. Do you have any artifical heart valves or other implanted medical devices like a pacemaker, defibrillator, or continuous glucose monitor? No   16. Do you have artificial joints? YES -    17. Are you allergic to latex? No       Health Care Directive  Patient has a Health Care Directive on file      Preoperative Review of    reviewed - controlled substances reflected in medication list.          Patient Active Problem List    Diagnosis Date Noted    Primary osteoarthritis of knee 11/16/2023     Priority: Medium    Alcohol dependence (H) 09/30/2022     Priority: Medium    Type 2 diabetes mellitus without complication, without long-term current use of insulin (H) 07/07/2020     Priority: Medium    Hx of gout 01/10/2019     Priority: Medium     Uric acid level 5.5 in 2017        Obesity hypoventilation syndrome (H) 01/08/2019     Priority: Medium    Restrictive lung disease 01/08/2019     Priority: Medium    Hearing problem of both ears 01/08/2019     Priority: Medium     Saw audiology 2017 - rec f/u 3 yrs        Multiple thyroid nodules 01/08/2019     Priority: Medium     Noted on thyroid US 2014 - 0.6 cm right nodule, 0.4 cm left nodule - rec   f/u 6-12 months        Abnormal liver ultrasound 01/08/2019     Priority: Medium     Changes c/w hepatocellular disease vs fatty liver        Hx of colonic polyps 01/08/2019     Priority: Medium     Colonoscopy 2016 - 2 hyperplastic polyps - likely f/u 5 yrs        Elevated triglycerides with high cholesterol 12/04/2018     Priority:  Medium    Benign essential hypertension 12/04/2018     Priority: Medium     Coronary calcium score 133 in 2017        Other emphysema (H) 12/04/2018     Priority: Medium     Hx following with pulmonology, [facility]        Recurrent major depressive disorder, in full remission (H24) 12/04/2018     Priority: Medium    SVT (supraventricular tachycardia) (H24) 12/04/2018     Priority: Medium     Hx 2 episodes in past year, now on diltiazem for this        TERESA (obstructive sleep apnea) 12/04/2018     Priority: Medium     Has BIPAP machine        Bilateral primary osteoarthritis of knee 12/04/2018     Priority: Medium    Obesity (BMI 35.0-39.9) with comorbidity (H) 12/04/2018     Priority: Medium      Past Medical History:   Diagnosis Date    Arrhythmia     Arthritis     Benign essential hypertension 12/04/2018    Diabetes (H)     Elevated triglycerides with high cholesterol 12/04/2018    Obesity (BMI 35.0-39.9) with comorbidity (H) 12/04/2018    TERESA (obstructive sleep apnea) 12/04/2018    Has BIPAP machine    Other emphysema (H) 12/04/2018    Hx following with pulmonology, [facility]    Patellofemoral disorder of both knees 12/04/2018    Prediabetes 12/04/2018    Recurrent major depressive disorder, in full remission (H24) 12/04/2018    Spinal headache     SVT (supraventricular tachycardia) (H24) 12/04/2018    Hx 2 episodes in past year, now on diltiazem for this     Past Surgical History:   Procedure Laterality Date    ARTHROPLASTY KNEE Right 11/16/2023    Procedure: RIGHT TOTAL KNEE ARTHROPLASTY;  Surgeon: Ajay Pendleton DO;  Location: [facility] Main OR    ARTHROPLASTY KNEE Left 3/11/2024    Procedure: LEFT TOTAL KNEE ARTHROPLASTY;  Surgeon: Ajay Pendleton DO;  Location: [facility] Main OR    HEMORRHOIDECTOMY EXTERNAL  2004    INCISION AND DRAINAGE PERIRECTAL ABSCESS       Current Outpatient Medications   Medication Sig Dispense Refill    acetaminophen (TYLENOL) 325 MG tablet Take 2 tablets (650 mg) by mouth every 4  hours as needed for other (mild pain) 100 tablet 0    allopurinol (ZYLOPRIM) 100 MG tablet TAKE 1 TABLET BY MOUTH ONCE DAILY 90 tablet 1    aspirin 81 MG EC tablet Take 1 tablet (81 mg) by mouth 2 times daily 60 tablet 0    atorvastatin (LIPITOR) 20 MG tablet TAKE ONE TABLET BY MOUTH EVERY DAY AT BEDTIME 90 tablet 3    blood glucose (NO BRAND SPECIFIED) lancets standard Use to test blood sugar 1 times daily or as directed. 100 each 1    blood glucose (NO BRAND SPECIFIED) test strip Use to test blood sugar 1 times daily or as directed. 100 strip 1    blood glucose (ONE TOUCH DELICA) lancing device Lancing device to be used with lancets. 1 each 0    blood glucose monitoring (ONETOUCH VERIO) meter device kit Use to test blood sugar 1 times daily or as directed. 1 kit 0    buPROPion (WELLBUTRIN XL) 150 MG 24 hr tablet TAKE ONE TABLET BY MOUTH EVERY DAY 90 tablet 0    diltiazem ER (CARDIAZEM LA) 240 MG 24 hr tablet Take 1 tablet (240 mg) by mouth daily for 30 days 30 tablet 0    diltiazem ER COATED BEADS (CARDIZEM CD/CARTIA XT) 180 MG 24 hr capsule TAKE ONE CAPSULE BY MOUTH EVERY DAY 90 capsule 3    fenofibrate (TRICOR) 48 MG tablet TAKE ONE TABLET BY MOUTH EVERY DAY 90 tablet 3    FLUoxetine (PROZAC) 20 MG capsule TAKE ONE CAPSULE BY MOUTH EVERY DAY 90 capsule 1    hydrOXYzine HCl (ATARAX) 10 MG tablet Take 1 tablet (10 mg) by mouth every 6 hours as needed for itching or anxiety (with pain, moderate pain) 30 tablet 0    Lancets (ONETOUCH DELICA PLUS [id number]) MISC Inject 1 Lancet Subcutaneous daily      losartan (COZAAR) 50 MG tablet TAKE ONE TABLET BY MOUTH EVERY DAY 90 tablet 2    metFORMIN (GLUCOPHAGE) 500 MG tablet Take 1 tablet (500 mg) by mouth 2 times daily (with meals) 180 tablet 1    naproxen sodium (ANAPROX) 220 MG tablet Take 220 mg by mouth daily      OLANZapine (ZYPREXA) 5 MG tablet TAKE TWO TABLETS BY MOUTH EVERY  tablet 1    oxyCODONE (ROXICODONE) 5 MG tablet Take 1-2 tablets (5-10 mg) by mouth  "every 4 hours as needed for moderate to severe pain 26 tablet 0    senna-docusate (SENOKOT-S/PERICOLACE) 8.6-50 MG tablet Take 1-2 tablets by mouth 2 times daily Take while on oral narcotics to prevent or treat constipation. 60 tablet 0       No Known Allergies     Social History     Tobacco Use    Smoking status: Some Days     Current packs/day: 0.00     Average packs/day: 1 pack/day for 40.0 years (40.0 ttl pk-yrs)     Types: Cigars, Cigarettes     Start date: 1974     Last attempt to quit: 2014     Years since quitting: 10.2    Smokeless tobacco: Never    Tobacco comments:     Rare cigar and E cig use   Substance Use Topics    Alcohol use: Yes     Alcohol/week: 56.0 standard drinks of alcohol     Types: 56 Glasses of wine per week     Comment: 21-28 per week, will cut back prior to surgery     Family History   Problem Relation Age of Onset    Pancreatic Cancer Mother 62.00    Depression Mother     Cancer Father 88.00        gallbladder    Diabetes Sister     Depression Sister     No Known Problems Brother     Heart Failure Maternal Grandmother          early 70s    Cerebrovascular Disease Maternal Grandfather          in 80s    Heart Failure Paternal Grandmother          in 90s    Coronary Artery Disease Paternal Grandfather         late 60s    Heart Failure Paternal Grandfather     No Known Problems Brother      History   Drug Use No         Review of Systems  Complete review of systems is obtained.  Other than the specific considerations noted above complete review of systems is negative.      Objective    /68   Pulse 85   Temp 97.5  F (36.4  C)   Resp 18   Ht 1.753 m (5' 9\")   Wt 100.7 kg (222 lb)   SpO2 95%   BMI 32.78 kg/m     Estimated body mass index is 32.78 kg/m  as calculated from the following:    Height as of this encounter: 1.753 m (5' 9\").    Weight as of this encounter: 100.7 kg (222 lb).  Physical Exam        General Appearance:    Alert, cooperative, no distress "   Eyes:   No scleral icterus or conjunctival irritation       Ears:    Normal TM's and external ear canals, both ears   Throat:   Lips, mucosa, and tongue normal; teeth and gums normal   Neck:   Supple, symmetrical, trachea midline, no adenopathy;        thyroid:  No enlargement/tenderness/nodules   Lungs:     Clear to auscultation bilaterally, respirations unlabored, no wheezes or crackles   Heart:    Regular rate and rhythm,  No murmur   Extremities:  No edema, no joint swelling or redness, no evidence of any injuries   Skin:  No concerning skin findings, no suspicious moles, no rashes   Neurologic:  On gross examination there is no motor or sensory deficit.  Patient walks with a normal gait             Recent Labs   Lab Test 03/24/24  1553 03/12/24  0600 03/12/24  0559 02/19/24  1119 10/02/23  1213 09/11/23  1324   HGB 13.3  --  11.2* 12.0*   < > 14.9   *  --   --  177   < > 169    136  --  141   < > 142   POTASSIUM 4.3 4.5  --  4.3   < > 4.4   CR 0.99 0.80  --  0.66*   < > 0.77   A1C  --   --   --  5.8*  --  6.8*    < > = values in this interval not displayed.        Diagnostics  No labs were ordered during this visit.   No EKG this visit, completed in the last 90 days.    Per provided cardiology instructions ablation labs will be obtained the morning of the procedure.  No indication for any other lab testing at this time based on review of most recent lab testing.    EKG obtained on March 24, 2024 both during episode of SVT and post episode.    Revised Cardiac Risk Index (RCRI)  The patient has the following serious cardiovascular risks for perioperative complications:   - No serious cardiac risks = 0 points     RCRI Interpretation: 0 points: Class I (very low risk - 0.4% complication rate)         Signed Electronically by: Rashi Nichols MD, MD  Copy of this evaluation report is provided to requesting physician.

## 2024-04-15 NOTE — H&P (VIEW-ONLY)
Preoperative Evaluation  [facility]  [address]  [facility] [address]  Phone: [phone]  Fax: [phone]  Primary Provider: Rashi Muro  Pre-op Performing Provider: RASHI MURO  Apr 15, 2024       Christopher is a 73 year old, presenting for the following:  Recheck Medication and Pre-Op Exam        4/15/2024    11:08 AM   Additional Questions   Roomed by am [facility]         4/15/2024   Forms   Any forms needing to be completed Yes     Surgical Information  Surgery/Procedure: Ablation Supraventricular Tachycardia   Surgery Location: [facility]  Surgeon: Dr. Pacheco  Surgery Date: 4/30/24  Time of Surgery: 10 am (7 am)  Where patient plans to recover: At home with family  Fax number for surgical facility: Note does not need to be faxed, will be available electronically in Epic.    Assessment & Plan     The proposed surgical procedure is considered INTERMEDIATE risk.    Christopher was seen today for recheck medication and pre-op exam.    Diagnoses and all orders for this visit:    Preop general physical exam    Paroxysmal supraventricular tachycardia (H24)    Benign essential hypertension    Type 2 diabetes mellitus without complication, without long-term current use of insulin (H)    History of gout    TERESA (obstructive sleep apnea)           - No identified additional risk factors other than previously addressed    Antiplatelet or Anticoagulation Medication Instructions   - Patient is on no antiplatelet or anticoagulation medications.    Additional Medication Instructions  Patient is to take all scheduled medications on the day of surgery EXCEPT for modifications listed below:    Hold metformin and losartan the day of procedure only.    Recommendation  APPROVAL GIVEN to proceed with proposed procedure, without further diagnostic evaluation.    Subjective       Via the Health Maintenance questionnaire, the patient has reported the following services have been completed -Eye Exam,  this information has been sent to the abstraction team.    HPI related to upcoming procedure:     He has had symptomatic supraventricular tachycardia and is scheduled for intervention.  He has had approximately 5 episodes in the past 7 years with the most recent episode in March 2024 resulting in the emergency department visit with use of adenosine.    He has type 2 diabetes his A1c was obtained in February was 5.8 demonstrating good control.  He is on metformin.    Blood pressure is well-controlled with current agents.  He is on a statin for vascular disease risk reduction and has taken an aspirin currently for overall vascular disease risk reduction.  He does not have any prior history of vascular disease.  He has had normal renal function.  He has a prior history of gout without any recent attacks.    He has a history of obstructive sleep apnea he was placed on BiPAP but was intolerant of treatment and is currently untreated.    He recently underwent right and left arthroplasty within the past year.  He continues to recover from his most recent left knee arthroplasty which was performed in March 2024.                4/10/2024    10:42 AM   Preop Questions   1. Have you ever had a heart attack or stroke? No   2. Have you ever had surgery on your heart or blood vessels, such as a stent placement, a coronary artery bypass, or surgery on an artery in your head, neck, heart, or legs? No   3. Do you have chest pain with activity? No   4. Do you have a history of  heart failure? No   5. Do you currently have a cold, bronchitis or symptoms of other infection? No   6. Do you have a cough, shortness of breath, or wheezing? No   7. Do you or anyone in your family have previous history of blood clots? No   8. Do you or does anyone in your family have a serious bleeding problem such as prolonged bleeding following surgeries or cuts? No   9. Have you ever had problems with anemia or been told to take iron pills? No   10. Have  you had any abnormal blood loss such as black, tarry or bloody stools? No   11. Have you ever had a blood transfusion? No   12. Are you willing to have a blood transfusion if it is medically needed before, during, or after your surgery? Yes   13. Have you or any of your relatives ever had problems with anesthesia? No   14. Do you have sleep apnea, excessive snoring or daytime drowsiness? YES -    14a. Do you have a CPAP machine? No   15. Do you have any artifical heart valves or other implanted medical devices like a pacemaker, defibrillator, or continuous glucose monitor? No   16. Do you have artificial joints? YES -    17. Are you allergic to latex? No       Health Care Directive  Patient has a Health Care Directive on file      Preoperative Review of    reviewed - controlled substances reflected in medication list.          Patient Active Problem List    Diagnosis Date Noted    Primary osteoarthritis of knee 11/16/2023     Priority: Medium    Alcohol dependence (H) 09/30/2022     Priority: Medium    Type 2 diabetes mellitus without complication, without long-term current use of insulin (H) 07/07/2020     Priority: Medium    Hx of gout 01/10/2019     Priority: Medium     Uric acid level 5.5 in 2017        Obesity hypoventilation syndrome (H) 01/08/2019     Priority: Medium    Restrictive lung disease 01/08/2019     Priority: Medium    Hearing problem of both ears 01/08/2019     Priority: Medium     Saw audiology 2017 - rec f/u 3 yrs        Multiple thyroid nodules 01/08/2019     Priority: Medium     Noted on thyroid US 2014 - 0.6 cm right nodule, 0.4 cm left nodule - rec   f/u 6-12 months        Abnormal liver ultrasound 01/08/2019     Priority: Medium     Changes c/w hepatocellular disease vs fatty liver        Hx of colonic polyps 01/08/2019     Priority: Medium     Colonoscopy 2016 - 2 hyperplastic polyps - likely f/u 5 yrs        Elevated triglycerides with high cholesterol 12/04/2018     Priority:  Medium    Benign essential hypertension 12/04/2018     Priority: Medium     Coronary calcium score 133 in 2017        Other emphysema (H) 12/04/2018     Priority: Medium     Hx following with pulmonology, [facility]        Recurrent major depressive disorder, in full remission (H24) 12/04/2018     Priority: Medium    SVT (supraventricular tachycardia) (H24) 12/04/2018     Priority: Medium     Hx 2 episodes in past year, now on diltiazem for this        TERESA (obstructive sleep apnea) 12/04/2018     Priority: Medium     Has BIPAP machine        Bilateral primary osteoarthritis of knee 12/04/2018     Priority: Medium    Obesity (BMI 35.0-39.9) with comorbidity (H) 12/04/2018     Priority: Medium      Past Medical History:   Diagnosis Date    Arrhythmia     Arthritis     Benign essential hypertension 12/04/2018    Diabetes (H)     Elevated triglycerides with high cholesterol 12/04/2018    Obesity (BMI 35.0-39.9) with comorbidity (H) 12/04/2018    TERESA (obstructive sleep apnea) 12/04/2018    Has BIPAP machine    Other emphysema (H) 12/04/2018    Hx following with pulmonology, [facility]    Patellofemoral disorder of both knees 12/04/2018    Prediabetes 12/04/2018    Recurrent major depressive disorder, in full remission (H24) 12/04/2018    Spinal headache     SVT (supraventricular tachycardia) (H24) 12/04/2018    Hx 2 episodes in past year, now on diltiazem for this     Past Surgical History:   Procedure Laterality Date    ARTHROPLASTY KNEE Right 11/16/2023    Procedure: RIGHT TOTAL KNEE ARTHROPLASTY;  Surgeon: Ajay Pendleton DO;  Location: [facility] Main OR    ARTHROPLASTY KNEE Left 3/11/2024    Procedure: LEFT TOTAL KNEE ARTHROPLASTY;  Surgeon: Ajay Pendleton DO;  Location: [facility] Main OR    HEMORRHOIDECTOMY EXTERNAL  2004    INCISION AND DRAINAGE PERIRECTAL ABSCESS       Current Outpatient Medications   Medication Sig Dispense Refill    acetaminophen (TYLENOL) 325 MG tablet Take 2 tablets (650 mg) by mouth every 4  hours as needed for other (mild pain) 100 tablet 0    allopurinol (ZYLOPRIM) 100 MG tablet TAKE 1 TABLET BY MOUTH ONCE DAILY 90 tablet 1    aspirin 81 MG EC tablet Take 1 tablet (81 mg) by mouth 2 times daily 60 tablet 0    atorvastatin (LIPITOR) 20 MG tablet TAKE ONE TABLET BY MOUTH EVERY DAY AT BEDTIME 90 tablet 3    blood glucose (NO BRAND SPECIFIED) lancets standard Use to test blood sugar 1 times daily or as directed. 100 each 1    blood glucose (NO BRAND SPECIFIED) test strip Use to test blood sugar 1 times daily or as directed. 100 strip 1    blood glucose (ONE TOUCH DELICA) lancing device Lancing device to be used with lancets. 1 each 0    blood glucose monitoring (ONETOUCH VERIO) meter device kit Use to test blood sugar 1 times daily or as directed. 1 kit 0    buPROPion (WELLBUTRIN XL) 150 MG 24 hr tablet TAKE ONE TABLET BY MOUTH EVERY DAY 90 tablet 0    diltiazem ER (CARDIAZEM LA) 240 MG 24 hr tablet Take 1 tablet (240 mg) by mouth daily for 30 days 30 tablet 0    diltiazem ER COATED BEADS (CARDIZEM CD/CARTIA XT) 180 MG 24 hr capsule TAKE ONE CAPSULE BY MOUTH EVERY DAY 90 capsule 3    fenofibrate (TRICOR) 48 MG tablet TAKE ONE TABLET BY MOUTH EVERY DAY 90 tablet 3    FLUoxetine (PROZAC) 20 MG capsule TAKE ONE CAPSULE BY MOUTH EVERY DAY 90 capsule 1    hydrOXYzine HCl (ATARAX) 10 MG tablet Take 1 tablet (10 mg) by mouth every 6 hours as needed for itching or anxiety (with pain, moderate pain) 30 tablet 0    Lancets (ONETOUCH DELICA PLUS [id number]) MISC Inject 1 Lancet Subcutaneous daily      losartan (COZAAR) 50 MG tablet TAKE ONE TABLET BY MOUTH EVERY DAY 90 tablet 2    metFORMIN (GLUCOPHAGE) 500 MG tablet Take 1 tablet (500 mg) by mouth 2 times daily (with meals) 180 tablet 1    naproxen sodium (ANAPROX) 220 MG tablet Take 220 mg by mouth daily      OLANZapine (ZYPREXA) 5 MG tablet TAKE TWO TABLETS BY MOUTH EVERY  tablet 1    oxyCODONE (ROXICODONE) 5 MG tablet Take 1-2 tablets (5-10 mg) by mouth  "every 4 hours as needed for moderate to severe pain 26 tablet 0    senna-docusate (SENOKOT-S/PERICOLACE) 8.6-50 MG tablet Take 1-2 tablets by mouth 2 times daily Take while on oral narcotics to prevent or treat constipation. 60 tablet 0       No Known Allergies     Social History     Tobacco Use    Smoking status: Some Days     Current packs/day: 0.00     Average packs/day: 1 pack/day for 40.0 years (40.0 ttl pk-yrs)     Types: Cigars, Cigarettes     Start date: 1974     Last attempt to quit: 2014     Years since quitting: 10.2    Smokeless tobacco: Never    Tobacco comments:     Rare cigar and E cig use   Substance Use Topics    Alcohol use: Yes     Alcohol/week: 56.0 standard drinks of alcohol     Types: 56 Glasses of wine per week     Comment: 21-28 per week, will cut back prior to surgery     Family History   Problem Relation Age of Onset    Pancreatic Cancer Mother 62.00    Depression Mother     Cancer Father 88.00        gallbladder    Diabetes Sister     Depression Sister     No Known Problems Brother     Heart Failure Maternal Grandmother          early 70s    Cerebrovascular Disease Maternal Grandfather          in 80s    Heart Failure Paternal Grandmother          in 90s    Coronary Artery Disease Paternal Grandfather         late 60s    Heart Failure Paternal Grandfather     No Known Problems Brother      History   Drug Use No         Review of Systems  Complete review of systems is obtained.  Other than the specific considerations noted above complete review of systems is negative.      Objective    /68   Pulse 85   Temp 97.5  F (36.4  C)   Resp 18   Ht 1.753 m (5' 9\")   Wt 100.7 kg (222 lb)   SpO2 95%   BMI 32.78 kg/m     Estimated body mass index is 32.78 kg/m  as calculated from the following:    Height as of this encounter: 1.753 m (5' 9\").    Weight as of this encounter: 100.7 kg (222 lb).  Physical Exam        General Appearance:    Alert, cooperative, no distress "   Eyes:   No scleral icterus or conjunctival irritation       Ears:    Normal TM's and external ear canals, both ears   Throat:   Lips, mucosa, and tongue normal; teeth and gums normal   Neck:   Supple, symmetrical, trachea midline, no adenopathy;        thyroid:  No enlargement/tenderness/nodules   Lungs:     Clear to auscultation bilaterally, respirations unlabored, no wheezes or crackles   Heart:    Regular rate and rhythm,  No murmur   Extremities:  No edema, no joint swelling or redness, no evidence of any injuries   Skin:  No concerning skin findings, no suspicious moles, no rashes   Neurologic:  On gross examination there is no motor or sensory deficit.  Patient walks with a normal gait             Recent Labs   Lab Test 03/24/24  1553 03/12/24  0600 03/12/24  0559 02/19/24  1119 10/02/23  1213 09/11/23  1324   HGB 13.3  --  11.2* 12.0*   < > 14.9   *  --   --  177   < > 169    136  --  141   < > 142   POTASSIUM 4.3 4.5  --  4.3   < > 4.4   CR 0.99 0.80  --  0.66*   < > 0.77   A1C  --   --   --  5.8*  --  6.8*    < > = values in this interval not displayed.        Diagnostics  No labs were ordered during this visit.   No EKG this visit, completed in the last 90 days.    Per provided cardiology instructions ablation labs will be obtained the morning of the procedure.  No indication for any other lab testing at this time based on review of most recent lab testing.    EKG obtained on March 24, 2024 both during episode of SVT and post episode.    Revised Cardiac Risk Index (RCRI)  The patient has the following serious cardiovascular risks for perioperative complications:   - No serious cardiac risks = 0 points     RCRI Interpretation: 0 points: Class I (very low risk - 0.4% complication rate)         Signed Electronically by: Rashi Nichols MD, MD  Copy of this evaluation report is provided to requesting physician.

## 2024-04-22 ENCOUNTER — TELEPHONE (OUTPATIENT)
Dept: CARDIOLOGY | Facility: CLINIC | Age: 74
End: 2024-04-22
Payer: MEDICARE

## 2024-04-22 NOTE — TELEPHONE ENCOUNTER
Pre-Procedure Education    Procedure: SVT Abaltion with Dr Pacheco on 4/30 with arrival time 7:00 am    Orders: Orderset for procedure verified signed/held    COVID: COVID policy- if pt develops COVID like symptoms prior to procedure, he/she would need to complete an at home with a rapid antigen COVID test 1-2 days prior to your procedure date. If COVID + pt is aware the procedure will need to be rescheduled, and to contact CV scheduling as soon as possible    Pre-Op H&P: Completed- Available in Epic    Education:   Contact: Reviewed via phone with pt  Pre-Procedure Instruction: NPO after midnight pre procedure, Defined NPO with pt, Remove all jewelry and leave all valuables at home, Shower prior to arrival, Sedation plan/orders, Transportation requirements and arrangements post procedure, Post-procedure follow up process, Post-procedure restrictions/expectations, and Pre-procedure letter sent- letter tab  Risks:  Cardiac Ablation  <1% Hypotension, Hemorrhage, Thrombophlebitis, Systemic or pulmonic emboli, Cardiac perforation (tamponade), Infection, Pneumothorax, Arrhythmias, Proarrhythmic effects of drugs, Radiation exposure, Catheter entrapment  <1 % Vascular injury including perforation of vein, artery or heart  1-2% Complete heart block (for AVNRT or septal accessory pathway)  <0.5% CVA or MI, 1% CVA if Left sided ablation  <0.1% death  If external defibrillation or CV is needed, 25% risk for superficial burn  1-2% Tamponade and Aortic puncture with left sided transeptal approach  Risks associated with general anesthesia will be addressed by the Anesthesiology Department      Medication:   Instructions regarding anticoagulants: Pt not on AC- N/A  Instructions regarding antiarrhythmic medication:  diltiazem ; hold 5 days prior to procedure  Instructions given to pt regarding diuretics medication: None  Instructions given to pt regarding DM/GLP-1 medication:   DM- Hold all oral diabetic medications, short acting  insulin the morning of the procedure, and take 1/2 of pts scheduled doses of long acting insulin the PM prior and/or AM of procedure  GLP-1- None  Instructions for medication, other than anticoagulants and antiarrhythmics listed above, given to pt: Take all medication AM of procedure with small sips of water     Important patient information for staff: None    4/22/2024 1:24 PM  Nadia Farrar RN

## 2024-04-26 DIAGNOSIS — E11.9 TYPE 2 DIABETES MELLITUS WITHOUT COMPLICATION, WITHOUT LONG-TERM CURRENT USE OF INSULIN (H): Primary | ICD-10-CM

## 2024-04-26 RX ORDER — LANCETS 33 GAUGE
EACH MISCELLANEOUS
Qty: 100 EACH | Refills: 1 | Status: SHIPPED | OUTPATIENT
Start: 2024-04-26

## 2024-04-29 ENCOUNTER — ANESTHESIA EVENT (OUTPATIENT)
Dept: CARDIOLOGY | Facility: HOSPITAL | Age: 74
End: 2024-04-29
Payer: MEDICARE

## 2024-04-29 ASSESSMENT — COPD QUESTIONNAIRES: COPD: 1

## 2024-04-29 NOTE — ANESTHESIA PREPROCEDURE EVALUATION
Anesthesia Pre-Procedure Evaluation    Patient: Christopher Lindsey   MRN: [id number] : 1950        Procedure : Procedure(s):  Ablation Supraventricular Tachycardia          Past Medical History:   Diagnosis Date    Arrhythmia     Arthritis     Benign essential hypertension 2018    Diabetes (H)     Elevated triglycerides with high cholesterol 2018    Obesity (BMI 35.0-39.9) with comorbidity (H) 2018    TERESA (obstructive sleep apnea) 2018    Has BIPAP machine    Other emphysema (H) 2018    Hx following with pulmonology, [facility]    Patellofemoral disorder of both knees 2018    Prediabetes 2018    Recurrent major depressive disorder, in full remission (H24) 2018    Spinal headache     SVT (supraventricular tachycardia) (H24) 2018    Hx 2 episodes in past year, now on diltiazem for this      Past Surgical History:   Procedure Laterality Date    ARTHROPLASTY KNEE Right 2023    Procedure: RIGHT TOTAL KNEE ARTHROPLASTY;  Surgeon: Ajay Pendleton DO;  Location: [facility] Main OR    ARTHROPLASTY KNEE Left 3/11/2024    Procedure: LEFT TOTAL KNEE ARTHROPLASTY;  Surgeon: Ajay Pendleton DO;  Location: [facility] Main OR    HEMORRHOIDECTOMY EXTERNAL  2004    INCISION AND DRAINAGE PERIRECTAL ABSCESS        No Known Allergies   Social History     Tobacco Use    Smoking status: Some Days     Current packs/day: 0.00     Average packs/day: 1 pack/day for 40.0 years (40.0 ttl pk-yrs)     Types: Cigars, Cigarettes     Start date: 1974     Last attempt to quit: 2014     Years since quitting: 10.3    Smokeless tobacco: Never    Tobacco comments:     Rare cigar and E cig use   Substance Use Topics    Alcohol use: Yes     Alcohol/week: 56.0 standard drinks of alcohol     Types: 56 Glasses of wine per week     Comment: 21-28 per week, will cut back prior to surgery      Wt Readings from Last 1 Encounters:   04/15/24 100.7 kg (222 lb)        Anesthesia Evaluation         "History of anesthetic complications       ROS/MED HX  ENT/Pulmonary:     (+) sleep apnea,                         COPD,              Neurologic:       Cardiovascular:     (+)  hypertension- -   -  - -                                      METS/Exercise Tolerance:     Hematologic:       Musculoskeletal:       GI/Hepatic:       Renal/Genitourinary:       Endo:     (+) type I DM,         thyroid problem,     Obesity,       Psychiatric/Substance Use:       Infectious Disease:       Malignancy:       Other:          Physical Exam    Airway      Comment: Thin, short, full beard.    Mallampati: III   TM distance: > 3 FB   Neck ROM: full   Mouth opening: > 3 cm    Respiratory Devices and Support         Dental       (+) Minor Abnormalities - some fillings, tiny chips      Cardiovascular   cardiovascular exam normal          Pulmonary           (+) decreased breath sounds           OUTSIDE LABS:  CBC:   Lab Results   Component Value Date    WBC 12.5 (H) 03/24/2024    WBC 5.0 02/19/2024    HGB 13.3 03/24/2024    HGB 11.2 (L) 03/12/2024    HCT 42.2 03/24/2024    HCT 39.0 (L) 02/19/2024     (H) 03/24/2024     02/19/2024     BMP:   Lab Results   Component Value Date     03/24/2024     03/12/2024    POTASSIUM 4.3 03/24/2024    POTASSIUM 4.5 03/12/2024    CHLORIDE 103 03/24/2024    CHLORIDE 101 03/12/2024    CO2 24 03/24/2024    CO2 30 (H) 03/12/2024    BUN 13.1 03/24/2024    BUN 13.7 03/12/2024    CR 0.99 03/24/2024    CR 0.80 03/12/2024     (H) 03/24/2024     (H) 03/12/2024     COAGS: No results found for: \"PTT\", \"INR\", \"FIBR\"  POC: No results found for: \"BGM\", \"HCG\", \"HCGS\"  HEPATIC:   Lab Results   Component Value Date    ALBUMIN 4.1 03/24/2024    PROTTOTAL 7.8 03/24/2024    ALT 22 03/24/2024    AST 41 03/24/2024    ALKPHOS 135 03/24/2024    BILITOTAL 0.5 03/24/2024     OTHER:   Lab Results   Component Value Date    A1C 5.8 (H) 02/19/2024    YOLA 10.1 03/24/2024    MAG 1.8 03/24/2024    " "TSH 3.10 03/24/2024       Anesthesia Plan    ASA Status:  3    NPO Status:  NPO Appropriate    Anesthesia Type: MAC.     - Reason for MAC: straight local not clinically adequate      Maintenance: TIVA.   Techniques and Equipment:     - Lines/Monitors: 2nd IV     Consents    Anesthesia Plan(s) and associated risks, benefits, and realistic alternatives discussed. Questions answered and patient/representative(s) expressed understanding.     - Discussed:     - Discussed with:  Patient      - Extended Intubation/Ventilatory Support Discussed: No.      - Patient is DNR/DNI Status: No     Use of blood products discussed: Yes.     - Discussed with: Patient.     - Consented: consented to blood products     Postoperative Care    Pain management: IV analgesics, Oral pain medications, Multi-modal analgesia.   PONV prophylaxis: Dexamethasone or Solumedrol, Ondansetron (or other 5HT-3)     Comments:    Other Comments: 2 PIV.  Ephedrine; PE infusion PRN.  Decadron, Zofran.  Versed, Fentanyl.           Mele Thompson MD    I have reviewed the pertinent notes and labs in the chart from the past 30 days and (re)examined the patient.  Any updates or changes from those notes are reflected in this note.              # Obesity: Estimated body mass index is 32.78 kg/m  as calculated from the following:    Height as of 4/15/24: 1.753 m (5' 9\").    Weight as of 4/15/24: 100.7 kg (222 lb).      "

## 2024-04-30 ENCOUNTER — ANESTHESIA (OUTPATIENT)
Dept: CARDIOLOGY | Facility: HOSPITAL | Age: 74
End: 2024-04-30
Payer: MEDICARE

## 2024-04-30 ENCOUNTER — HOSPITAL ENCOUNTER (OUTPATIENT)
Facility: HOSPITAL | Age: 74
Discharge: HOME | End: 2024-04-30
Attending: INTERNAL MEDICINE | Admitting: INTERNAL MEDICINE
Payer: MEDICARE

## 2024-04-30 VITALS
WEIGHT: 223 LBS | TEMPERATURE: 97.9 F | HEART RATE: 64 BPM | SYSTOLIC BLOOD PRESSURE: 148 MMHG | DIASTOLIC BLOOD PRESSURE: 75 MMHG | RESPIRATION RATE: 22 BRPM | OXYGEN SATURATION: 96 % | BODY MASS INDEX: 33.03 KG/M2 | HEIGHT: 69 IN

## 2024-04-30 DIAGNOSIS — I47.10 PAROXYSMAL SUPRAVENTRICULAR TACHYCARDIA (H): ICD-10-CM

## 2024-04-30 LAB
ANION GAP SERPL CALCULATED.3IONS-SCNC: 10 MMOL/L (ref 7–15)
ATRIAL RATE - MUSE: 65 BPM
BUN SERPL-MCNC: 11.8 MG/DL (ref 8–23)
CALCIUM SERPL-MCNC: 9.4 MG/DL (ref 8.8–10.2)
CHLORIDE SERPL-SCNC: 107 MMOL/L (ref 98–107)
CREAT SERPL-MCNC: 0.63 MG/DL (ref 0.67–1.17)
DEPRECATED HCO3 PLAS-SCNC: 25 MMOL/L (ref 22–29)
DIASTOLIC BLOOD PRESSURE - MUSE: NORMAL MMHG
EGFRCR SERPLBLD CKD-EPI 2021: >90 ML/MIN/1.73M2
ERYTHROCYTE [DISTWIDTH] IN BLOOD BY AUTOMATED COUNT: 17 % (ref 10–15)
GLUCOSE SERPL-MCNC: 144 MG/DL (ref 70–99)
HCT VFR BLD AUTO: 40.6 % (ref 40–53)
HGB BLD-MCNC: 12.7 G/DL (ref 13.3–17.7)
INTERPRETATION ECG - MUSE: NORMAL
MCH RBC QN AUTO: 28.6 PG (ref 26.5–33)
MCHC RBC AUTO-ENTMCNC: 31.3 G/DL (ref 31.5–36.5)
MCV RBC AUTO: 91 FL (ref 78–100)
P AXIS - MUSE: 31 DEGREES
PLATELET # BLD AUTO: 215 10E3/UL (ref 150–450)
POTASSIUM SERPL-SCNC: 4.2 MMOL/L (ref 3.4–5.3)
PR INTERVAL - MUSE: 186 MS
QRS DURATION - MUSE: 106 MS
QT - MUSE: 418 MS
QTC - MUSE: 434 MS
R AXIS - MUSE: 0 DEGREES
RBC # BLD AUTO: 4.44 10E6/UL (ref 4.4–5.9)
SODIUM SERPL-SCNC: 142 MMOL/L (ref 135–145)
SYSTOLIC BLOOD PRESSURE - MUSE: NORMAL MMHG
T AXIS - MUSE: 28 DEGREES
VENTRICULAR RATE- MUSE: 65 BPM
WBC # BLD AUTO: 6.5 10E3/UL (ref 4–11)

## 2024-04-30 PROCEDURE — 93653 COMPRE EP EVAL TX SVT: CPT | Performed by: INTERNAL MEDICINE

## 2024-04-30 PROCEDURE — 85027 COMPLETE CBC AUTOMATED: CPT | Performed by: INTERNAL MEDICINE

## 2024-04-30 PROCEDURE — 258N000003 HC RX IP 258 OP 636: Performed by: INTERNAL MEDICINE

## 2024-04-30 PROCEDURE — 258N000003 HC RX IP 258 OP 636: Performed by: ANESTHESIOLOGY

## 2024-04-30 PROCEDURE — C1730 CATH, EP, 19 OR FEW ELECT: HCPCS | Performed by: INTERNAL MEDICINE

## 2024-04-30 PROCEDURE — C1766 INTRO/SHEATH,STRBLE,NON-PEEL: HCPCS | Performed by: INTERNAL MEDICINE

## 2024-04-30 PROCEDURE — 272N000001 HC OR GENERAL SUPPLY STERILE: Performed by: INTERNAL MEDICINE

## 2024-04-30 PROCEDURE — 93005 ELECTROCARDIOGRAM TRACING: CPT

## 2024-04-30 PROCEDURE — 370N000017 HC ANESTHESIA TECHNICAL FEE, PER MIN: Performed by: INTERNAL MEDICINE

## 2024-04-30 PROCEDURE — C1894 INTRO/SHEATH, NON-LASER: HCPCS | Performed by: INTERNAL MEDICINE

## 2024-04-30 PROCEDURE — 80048 BASIC METABOLIC PNL TOTAL CA: CPT | Performed by: INTERNAL MEDICINE

## 2024-04-30 PROCEDURE — 999N000054 HC STATISTIC EKG NON-CHARGEABLE

## 2024-04-30 PROCEDURE — 258N000003 HC RX IP 258 OP 636: Performed by: NURSE ANESTHETIST, CERTIFIED REGISTERED

## 2024-04-30 PROCEDURE — 36415 COLL VENOUS BLD VENIPUNCTURE: CPT | Performed by: INTERNAL MEDICINE

## 2024-04-30 PROCEDURE — 93623 PRGRMD STIMJ&PACG IV RX NFS: CPT | Performed by: INTERNAL MEDICINE

## 2024-04-30 PROCEDURE — 93010 ELECTROCARDIOGRAM REPORT: CPT | Mod: HOP | Performed by: INTERNAL MEDICINE

## 2024-04-30 PROCEDURE — 250N000009 HC RX 250: Performed by: INTERNAL MEDICINE

## 2024-04-30 PROCEDURE — 250N000011 HC RX IP 250 OP 636: Performed by: ANESTHESIOLOGY

## 2024-04-30 PROCEDURE — 250N000009 HC RX 250: Performed by: ANESTHESIOLOGY

## 2024-04-30 PROCEDURE — C1732 CATH, EP, DIAG/ABL, 3D/VECT: HCPCS | Performed by: INTERNAL MEDICINE

## 2024-04-30 PROCEDURE — C1733 CATH, EP, OTHR THAN COOL-TIP: HCPCS | Performed by: INTERNAL MEDICINE

## 2024-04-30 RX ORDER — NALOXONE HYDROCHLORIDE 0.4 MG/ML
0.1 INJECTION, SOLUTION INTRAMUSCULAR; INTRAVENOUS; SUBCUTANEOUS
Status: DISCONTINUED | OUTPATIENT
Start: 2024-04-30 | End: 2024-04-30 | Stop reason: HOSPADM

## 2024-04-30 RX ORDER — SODIUM CHLORIDE 9 MG/ML
INJECTION, SOLUTION INTRAVENOUS CONTINUOUS PRN
Status: DISCONTINUED | OUTPATIENT
Start: 2024-04-30 | End: 2024-04-30

## 2024-04-30 RX ORDER — SODIUM CHLORIDE, SODIUM LACTATE, POTASSIUM CHLORIDE, CALCIUM CHLORIDE 600; 310; 30; 20 MG/100ML; MG/100ML; MG/100ML; MG/100ML
INJECTION, SOLUTION INTRAVENOUS CONTINUOUS
Status: DISCONTINUED | OUTPATIENT
Start: 2024-04-30 | End: 2024-04-30 | Stop reason: HOSPADM

## 2024-04-30 RX ORDER — KETAMINE HYDROCHLORIDE 10 MG/ML
INJECTION INTRAMUSCULAR; INTRAVENOUS PRN
Status: DISCONTINUED | OUTPATIENT
Start: 2024-04-30 | End: 2024-04-30

## 2024-04-30 RX ORDER — FENTANYL CITRATE 50 UG/ML
25 INJECTION, SOLUTION INTRAMUSCULAR; INTRAVENOUS
Status: DISCONTINUED | OUTPATIENT
Start: 2024-04-30 | End: 2024-04-30 | Stop reason: HOSPADM

## 2024-04-30 RX ORDER — PROPOFOL 10 MG/ML
INJECTION, EMULSION INTRAVENOUS CONTINUOUS PRN
Status: DISCONTINUED | OUTPATIENT
Start: 2024-04-30 | End: 2024-04-30

## 2024-04-30 RX ORDER — LIDOCAINE 40 MG/G
CREAM TOPICAL
Status: DISCONTINUED | OUTPATIENT
Start: 2024-04-30 | End: 2024-04-30 | Stop reason: HOSPADM

## 2024-04-30 RX ORDER — ACETAMINOPHEN 325 MG/1
650 TABLET ORAL EVERY 4 HOURS PRN
Status: DISCONTINUED | OUTPATIENT
Start: 2024-04-30 | End: 2024-04-30 | Stop reason: HOSPADM

## 2024-04-30 RX ORDER — LORAZEPAM 2 MG/ML
.5-1 INJECTION INTRAMUSCULAR
Status: DISCONTINUED | OUTPATIENT
Start: 2024-04-30 | End: 2024-04-30 | Stop reason: HOSPADM

## 2024-04-30 RX ORDER — SODIUM CHLORIDE 9 MG/ML
100 INJECTION, SOLUTION INTRAVENOUS CONTINUOUS
Status: DISCONTINUED | OUTPATIENT
Start: 2024-04-30 | End: 2024-04-30 | Stop reason: HOSPADM

## 2024-04-30 RX ORDER — ONDANSETRON 2 MG/ML
4 INJECTION INTRAMUSCULAR; INTRAVENOUS EVERY 30 MIN PRN
Status: DISCONTINUED | OUTPATIENT
Start: 2024-04-30 | End: 2024-04-30 | Stop reason: HOSPADM

## 2024-04-30 RX ORDER — HYDROMORPHONE HYDROCHLORIDE 1 MG/ML
0.2 INJECTION, SOLUTION INTRAMUSCULAR; INTRAVENOUS; SUBCUTANEOUS EVERY 5 MIN PRN
Status: DISCONTINUED | OUTPATIENT
Start: 2024-04-30 | End: 2024-04-30 | Stop reason: HOSPADM

## 2024-04-30 RX ORDER — HALOPERIDOL 5 MG/ML
1 INJECTION INTRAMUSCULAR
Status: DISCONTINUED | OUTPATIENT
Start: 2024-04-30 | End: 2024-04-30 | Stop reason: HOSPADM

## 2024-04-30 RX ORDER — ONDANSETRON 4 MG/1
4 TABLET, ORALLY DISINTEGRATING ORAL EVERY 30 MIN PRN
Status: DISCONTINUED | OUTPATIENT
Start: 2024-04-30 | End: 2024-04-30 | Stop reason: HOSPADM

## 2024-04-30 RX ORDER — KETOROLAC TROMETHAMINE 15 MG/ML
15 INJECTION, SOLUTION INTRAMUSCULAR; INTRAVENOUS
Status: DISCONTINUED | OUTPATIENT
Start: 2024-04-30 | End: 2024-04-30 | Stop reason: HOSPADM

## 2024-04-30 RX ORDER — FENTANYL CITRATE 50 UG/ML
INJECTION, SOLUTION INTRAMUSCULAR; INTRAVENOUS PRN
Status: DISCONTINUED | OUTPATIENT
Start: 2024-04-30 | End: 2024-04-30

## 2024-04-30 RX ORDER — FENTANYL CITRATE 50 UG/ML
50 INJECTION, SOLUTION INTRAMUSCULAR; INTRAVENOUS EVERY 5 MIN PRN
Status: DISCONTINUED | OUTPATIENT
Start: 2024-04-30 | End: 2024-04-30 | Stop reason: HOSPADM

## 2024-04-30 RX ORDER — ONDANSETRON 4 MG/1
4 TABLET, ORALLY DISINTEGRATING ORAL EVERY 6 HOURS PRN
Status: DISCONTINUED | OUTPATIENT
Start: 2024-04-30 | End: 2024-04-30 | Stop reason: HOSPADM

## 2024-04-30 RX ORDER — FENTANYL CITRATE 50 UG/ML
25 INJECTION, SOLUTION INTRAMUSCULAR; INTRAVENOUS EVERY 5 MIN PRN
Status: DISCONTINUED | OUTPATIENT
Start: 2024-04-30 | End: 2024-04-30 | Stop reason: HOSPADM

## 2024-04-30 RX ORDER — OXYCODONE HYDROCHLORIDE 5 MG/1
5 TABLET ORAL EVERY 4 HOURS PRN
Status: DISCONTINUED | OUTPATIENT
Start: 2024-04-30 | End: 2024-04-30 | Stop reason: HOSPADM

## 2024-04-30 RX ORDER — DOCUSATE SODIUM 100 MG/1
100 CAPSULE, LIQUID FILLED ORAL 2 TIMES DAILY PRN
COMMUNITY
End: 2024-06-11

## 2024-04-30 RX ORDER — ONDANSETRON 2 MG/ML
4 INJECTION INTRAMUSCULAR; INTRAVENOUS EVERY 6 HOURS PRN
Status: DISCONTINUED | OUTPATIENT
Start: 2024-04-30 | End: 2024-04-30 | Stop reason: HOSPADM

## 2024-04-30 RX ORDER — ACETAMINOPHEN 325 MG/1
975 TABLET ORAL
Status: DISCONTINUED | OUTPATIENT
Start: 2024-04-30 | End: 2024-04-30 | Stop reason: HOSPADM

## 2024-04-30 RX ORDER — MEPERIDINE HYDROCHLORIDE 25 MG/ML
12.5 INJECTION INTRAMUSCULAR; INTRAVENOUS; SUBCUTANEOUS EVERY 5 MIN PRN
Status: DISCONTINUED | OUTPATIENT
Start: 2024-04-30 | End: 2024-04-30 | Stop reason: HOSPADM

## 2024-04-30 RX ORDER — HYDROMORPHONE HYDROCHLORIDE 1 MG/ML
0.4 INJECTION, SOLUTION INTRAMUSCULAR; INTRAVENOUS; SUBCUTANEOUS EVERY 5 MIN PRN
Status: DISCONTINUED | OUTPATIENT
Start: 2024-04-30 | End: 2024-04-30 | Stop reason: HOSPADM

## 2024-04-30 RX ORDER — OXYCODONE HYDROCHLORIDE 5 MG/1
10 TABLET ORAL EVERY 4 HOURS PRN
Status: DISCONTINUED | OUTPATIENT
Start: 2024-04-30 | End: 2024-04-30 | Stop reason: HOSPADM

## 2024-04-30 RX ADMIN — SODIUM CHLORIDE: 0.9 INJECTION, SOLUTION INTRAVENOUS at 11:44

## 2024-04-30 RX ADMIN — KETAMINE HYDROCHLORIDE 20 MG: 10 INJECTION INTRAMUSCULAR; INTRAVENOUS at 11:55

## 2024-04-30 RX ADMIN — PROPOFOL 125 MCG/KG/MIN: 10 INJECTION, EMULSION INTRAVENOUS at 11:50

## 2024-04-30 RX ADMIN — MIDAZOLAM 2 MG: 1 INJECTION INTRAMUSCULAR; INTRAVENOUS at 11:48

## 2024-04-30 RX ADMIN — SODIUM CHLORIDE: 9 INJECTION, SOLUTION INTRAVENOUS at 13:26

## 2024-04-30 RX ADMIN — DEXMEDETOMIDINE HYDROCHLORIDE 8 MCG: 100 INJECTION, SOLUTION INTRAVENOUS at 11:51

## 2024-04-30 RX ADMIN — FENTANYL CITRATE 25 MCG: 50 INJECTION INTRAMUSCULAR; INTRAVENOUS at 11:56

## 2024-04-30 ASSESSMENT — ACTIVITIES OF DAILY LIVING (ADL)
ADLS_ACUITY_SCORE: 38

## 2024-04-30 NOTE — DISCHARGE INSTRUCTIONS
[facility]  Cardiac Electrophysiology  [address]  [place] [address]   Office: [phone]  Fax: [phone]     Cardiac Electrophysiology - Post Ablation Discharge Instructions      PROCEDURE   SVT ablation       MEDICATION INSTRUCTIONS   Continue taking current home meds         DISCHARGE INSTRUCTIONS   General instructions  Have an adult stay with you until tomorrow.  You may resume your normal diet.    You may shower tomorrow.  Do NOT take a bath, or use a hot tub or pool for at least 1 week. Do not scrub the site. Do not use lotion or powder near the puncture site.    Groin care instructions  For the first 24 hrs - check the puncture site every 1-2 hours while awake.  You may keep a bandaid over the puncture sites for 1 or 2 days post-procedure and thereafter may keep these sites uncovered.  Change the bandaid daily.  If there is minor oozing, apply another bandaid and remove it after 12 hours.  For 2 days, when you cough, sneeze, laugh or move your bowels, hold your hand over the puncture site and press firmly.  Mild bruising at the access sites is normal.  If you notice increased swelling, external bleeding, or have other concerns regarding your access sites please consider emergency department evaluation and call your electrophysiology team's office    Activity recommendations  Do not drive for 3 days.  Avoid stooping or squatting more than 90 degrees at the hips for 7 days  Avoid repetitive motions such as loading , vacuuming, raking or shoveling  Avoid heavy lifting (greater than 25lbs) for 1 week    Post ablation instructions  You may have some irregular heartbeats following your ablation.  These episodes should occur less frequently over time.  Pleuritic chest discomfort (chest pain worse with taking deep breaths, worse with laying flat on your back) can occur after ablation, usually coming about within the first 24-48hrs post ablation.  If this  occurs and is severe enough to be troublesome to you, please call us and consider starting a course of ibuprofen 400mg three times daily for 5 to 7 days    Things to watch for  As with any type of procedure, please be more attentive to unusual symptoms post ablation (eg. fever, neurologic changes, pain with swallowing, loss of consciousness, etc) - we recommend ER evaluation for any such symptoms in the first few weeks post procedure.    Consider ER evaluation for the following:  Severe chest pain not relieved by Tylenol or Ibuprofen  You have chills or a fever greater than 101 F (38 C)  Neurologic changes (eg. leg, arm or face weakness or numbness, difficulties with speech or word finding, problems walking or with your balance, vision changes)  Severe difficulty swallowing and/or you are coughing up blood  Shortness of breath  Increased groin pain or a large or growing hard lump around the site  Groin is red, swollen, hot or tender  Blood or fluid is draining from the groin site  Any numbness, coolness or changes in color in your extremities  Groin pain not relieved by Tylenol or Advil      Our office will have a follow-up visit scheduled for you in approximately 6 weeks.  Please do not hesitate to call us before that time should issues arise.        [facility]    [phone]    If you are calling after hours, please listen to the entire voicemail,   a live  will answer at the end of the message.    [phone] to reach the EP nurses working with Dr Pacheco

## 2024-04-30 NOTE — ANESTHESIA CARE TRANSFER NOTE
Patient: Christopher Lindsey    Procedure: Procedure(s):  Ablation Supraventricular Tachycardia       Diagnosis: SVT  Diagnosis Additional Information: No value filed.    Anesthesia Type:   MAC     Note:    Oropharynx: oropharynx clear of all foreign objects and spontaneously breathing  Level of Consciousness: awake  Oxygen Supplementation: face mask  Level of Supplemental Oxygen (L/min / FiO2): 6  Independent Airway: airway patency satisfactory and stable  Dentition: dentition unchanged  Vital Signs Stable: post-procedure vital signs reviewed and stable  Report to RN Given: handoff report given  Patient transferred to: PACU    Handoff Report: Identifed the Patient, Identified the Reponsible Provider, Reviewed the pertinent medical history, Discussed the surgical course, Reviewed Intra-OP anesthesia mangement and issues during anesthesia, Set expectations for post-procedure period and Allowed opportunity for questions and acknowledgement of understanding  Vitals:  Vitals Value Taken Time   BP     Temp     Pulse     Resp     SpO2         Electronically Signed By: SULLY Knapp CRNA  April 30, 2024  1:51 PM

## 2024-04-30 NOTE — INTERVAL H&P NOTE
"I have reviewed the surgical (or preoperative) H&P that is linked to this encounter, and examined the patient. There are no significant changes    Clinical Conditions Present on Arrival:  Clinically Significant Risk Factors Present on Admission                 # Drug Induced Platelet Defect: home medication list includes an antiplatelet medication  # Obesity: Estimated body mass index is 32.93 kg/m  as calculated from the following:    Height as of this encounter: 1.753 m (5' 9\").    Weight as of this encounter: 101.2 kg (223 lb).       "

## 2024-04-30 NOTE — Clinical Note
[employer] system 12 lead EKG, hemodynamics 5 lead, pulse oximetery, NIBP, [employer] hands off defibrillator/external pacer, with 3 monitoring leads to patient. Baseline assessment done.

## 2024-04-30 NOTE — ANESTHESIA POSTPROCEDURE EVALUATION
Patient: Christopher Lindsey    Procedure: Procedure(s):  Ablation Supraventricular Tachycardia       Anesthesia Type:  MAC    Note:  Disposition: Outpatient   Postop Pain Control: Uneventful            Sign Out: Well controlled pain   PONV: No   Neuro/Psych: Uneventful            Sign Out: Acceptable/Baseline neuro status   Airway/Respiratory: Uneventful            Sign Out: Acceptable/Baseline resp. status   CV/Hemodynamics: Uneventful            Sign Out: Acceptable CV status; No obvious hypovolemia; No obvious fluid overload   Other NRE: NONE   DID A NON-ROUTINE EVENT OCCUR? No           Last vitals:  Vitals:    04/30/24 1515 04/30/24 1530 04/30/24 1545   BP: 134/79 138/85 136/78   Pulse: 68 66 63   Resp: 20 25 23   Temp:      SpO2: 95% 94% 95%       Electronically Signed By: Mele Thompson MD  April 30, 2024  3:48 PM

## 2024-05-08 ENCOUNTER — TRANSFERRED RECORDS (OUTPATIENT)
Dept: HEALTH INFORMATION MANAGEMENT | Facility: CLINIC | Age: 74
End: 2024-05-08
Payer: MEDICARE

## 2024-05-22 DIAGNOSIS — E11.9 TYPE 2 DIABETES MELLITUS WITHOUT COMPLICATION, WITHOUT LONG-TERM CURRENT USE OF INSULIN (H): ICD-10-CM

## 2024-05-22 DIAGNOSIS — F33.42 RECURRENT MAJOR DEPRESSIVE DISORDER, IN FULL REMISSION (H): ICD-10-CM

## 2024-05-30 ENCOUNTER — TELEPHONE (OUTPATIENT)
Dept: GASTROENTEROLOGY | Facility: CLINIC | Age: 74
End: 2024-05-30
Payer: MEDICARE

## 2024-05-30 NOTE — TELEPHONE ENCOUNTER
"Patient is going to check with other locations to get in sooner and closer to home.     Endoscopy Scheduling Screen    Have you had a positive Covid test in the last 14 days?  No    What is your communication preference for Instructions and/or Bowel Prep?   MyChart    What insurance is in the chart?  Other:  MEDICARE    Ordering/Referring Provider:     Rashi Nichols MD in [facility] GASTROENTEROLOGY      (If ordering provider performs procedure, schedule with ordering provider unless otherwise instructed. )    BMI: Estimated body mass index is 32.93 kg/m  as calculated from the following:    Height as of 4/30/24: 1.753 m (5' 9\").    Weight as of 4/30/24: 101.2 kg (223 lb).     Sedation Ordered  MAC/deep sedation.   BMI<= 45 45 < BMI <= 48 48 < BMI < = 50  BMI > 50   No Restrictions No [facility]  No [facility]  [facility] with exceptions Hospital Only OR Only       Do you have a history of malignant hyperthermia?  No    (Females) Are you currently pregnant?   No     Have you been diagnosed or told you have pulmonary hypertension?   No    Do you have an LVAD?  No    Have you been told you have moderate to severe sleep apnea?  Yes (RN Review required for scheduling unless scheduling in Hospital.)    Have you been told you have COPD, asthma, or any other lung disease?  No    Do you have any heart conditions?  No     Have you ever had or are you waiting for an organ transplant?  No. Continue scheduling, no site restrictions.    Have you had a stroke or transient ischemic attack (TIA aka \"mini stroke\" in the last 6 months?   No    Have you been diagnosed with or been told you have cirrhosis of the liver?   No    Are you currently on dialysis?   No    Do you need assistance transferring?   No    BMI: Estimated body mass index is 32.93 kg/m  as calculated from the following:    Height as of 4/30/24: 1.753 m (5' 9\").    Weight as of 4/30/24: 101.2 kg (223 lb).     Is patients BMI > 40 and scheduling location [facility]?  No    Do you take " an injectable medication for weight loss or diabetes (excluding insulin)?  No    Do you take the medication Naltrexone?  No    Do you take blood thinners?  No       Prep   Are you currently on dialysis or do you have chronic kidney disease?  No    Do you have a diagnosis of diabetes?  Yes (Golytely Prep)    Do you have a diagnosis of cystic fibrosis (CF)?  No    On a regular basis do you go 3 -5 days between bowel movements?  No    BMI > 40?  No    Preferred Pharmacy:    [address] [place] [address]  [address] [place] [id number]  Phone: [phone] Fax: [phone]    Final Scheduling Details     Procedure scheduled  Colonoscopy    Surgeon:       Date of procedure:       Pre-OP / PAC:       Location       Sedation          Patient Reminders:   You will receive a call from a Nurse to review instructions and health history.  This assessment must be completed prior to your procedure.  Failure to complete the Nurse assessment may result in the procedure being cancelled.      On the day of your procedure, please designate an adult(s) who can drive you home stay with you for the next 24 hours. The medicines used in the exam will make you sleepy. You will not be able to drive.      You cannot take public transportation, ride share services, or non-medical taxi service without a responsible caregiver.  Medical transport services are allowed with the requirement that a responsible caregiver will receive you at your destination.  We require that drivers and caregivers are confirmed prior to your procedure.

## 2024-06-04 ENCOUNTER — TRANSFERRED RECORDS (OUTPATIENT)
Dept: HEALTH INFORMATION MANAGEMENT | Facility: CLINIC | Age: 74
End: 2024-06-04
Payer: MEDICARE

## 2024-06-07 DIAGNOSIS — F33.42 RECURRENT MAJOR DEPRESSIVE DISORDER, IN FULL REMISSION (H): ICD-10-CM

## 2024-06-07 RX ORDER — BUPROPION HYDROCHLORIDE 150 MG/1
TABLET ORAL
Qty: 90 TABLET | Refills: 0 | Status: SHIPPED | OUTPATIENT
Start: 2024-06-07 | End: 2024-09-09

## 2024-06-08 NOTE — LETTER
Letter by Shira Contreras MD at      Author: Shira Contreras MD Service: -- Author Type: --    Filed:  Encounter Date: 9/11/2019 Status: (Other)         Christopher Lindsey  [address] [place] [address]     September 11, 2019     Dear Mr. Lindsey,    Below are the results from your recent visit:    Resulted Orders   Hepatic Profile   Result Value Ref Range    Bilirubin, Total 0.3 0.0 - 1.0 mg/dL    Bilirubin, Direct 0.2 <=0.5 mg/dL    Protein, Total 7.0 6.0 - 8.0 g/dL    Albumin 3.9 3.5 - 5.0 g/dL    Alkaline Phosphatase 106 45 - 120 U/L    AST 82 (H) 0 - 40 U/L    ALT 90 (H) 0 - 45 U/L     Your liver levels are still mildly elevated though slightly better than the last time they were checked. Let's wait and see what the ultrasound shows but we may choose to have you meet with the gastroenterologist about these levels.    Please call with questions or contact us using [employer].    Sincerely,        Shira Contreras MD       
Letter by Shira Contreras MD at      Author: Shira Contreras MD Service: -- Author Type: --    Filed:  Encounter Date: 9/11/2019 Status: Signed         Christopher Lindsey  [address] [place] [address]       September 11, 2019     Dear Mr. Lindsey,    Below are the results from your recent visit:    Resulted Orders   Hepatic Profile   Result Value Ref Range    Bilirubin, Total 0.3 0.0 - 1.0 mg/dL    Bilirubin, Direct 0.2 <=0.5 mg/dL    Protein, Total 7.0 6.0 - 8.0 g/dL    Albumin 3.9 3.5 - 5.0 g/dL    Alkaline Phosphatase 106 45 - 120 U/L    AST 82 (H) 0 - 40 U/L    ALT 90 (H) 0 - 45 U/L     Your liver tests are still elevated and are a little higher than they were when checked in 2018. I want to see what your ultrasound shows but I am anticipating we may choose to have you see a liver specialist (gastroenterologist) to discuss these results.    Please call with questions or contact us using [employer].    Sincerely,         Shira Contreras MD       
Other

## 2024-06-11 ENCOUNTER — OFFICE VISIT (OUTPATIENT)
Dept: CARDIOLOGY | Facility: CLINIC | Age: 74
End: 2024-06-11
Payer: MEDICARE

## 2024-06-11 VITALS
DIASTOLIC BLOOD PRESSURE: 80 MMHG | OXYGEN SATURATION: 96 % | SYSTOLIC BLOOD PRESSURE: 133 MMHG | WEIGHT: 228 LBS | HEART RATE: 69 BPM | BODY MASS INDEX: 33.67 KG/M2

## 2024-06-11 DIAGNOSIS — Z86.79 STATUS POST CATHETER ABLATION OF SLOW PATHWAY: ICD-10-CM

## 2024-06-11 DIAGNOSIS — G47.33 OSA (OBSTRUCTIVE SLEEP APNEA): ICD-10-CM

## 2024-06-11 DIAGNOSIS — Z98.890 STATUS POST CATHETER ABLATION OF SLOW PATHWAY: ICD-10-CM

## 2024-06-11 DIAGNOSIS — I10 BENIGN ESSENTIAL HYPERTENSION: ICD-10-CM

## 2024-06-11 DIAGNOSIS — I47.10 SVT (SUPRAVENTRICULAR TACHYCARDIA) (H): Primary | ICD-10-CM

## 2024-06-11 PROCEDURE — 99213 OFFICE O/P EST LOW 20 MIN: CPT | Performed by: NURSE PRACTITIONER

## 2024-06-11 NOTE — PROGRESS NOTES
[facility]  Cardiac Electrophysiology  [address]  [place] [address]   Office: [phone]  Fax: [phone]     HEART CARE ELECTROPHYSIOLOGY FOLLOW UP    Primary Care: Rashi Nichols MD    Assessment/Recommendations     SVT-AVNRT: Status post slow pathway ablation 2024.  Unremarkable recovery without symptomatology or documentation of recurrent arrhythmia    HTN: Controlled    Follow up: general cardiology 6 months       History of Present Illness/Subjective    Christopher Lindsey is a 73 year old male who is seen today for follow up status post SVT ablation. He has a past medical history significant for HTN, non-insulin-dependent type 2 diabetes, untreated TERESA, alcohol use, obesity.  History of SVT dates back to , treated with diltiazem, with episodes approximately once a year since that time, requiring adenosine for termination 3/24/2024.  He underwent EPS and slow pathway AVNRT ablation 2024. His recovery has been uneventful. He underwent knee surgery in March and is slowly regaining stamina and strength.  He denies groin site issues, dyspnea, palpitations, chest discomfort, lightheadedness/dizziness, pedal edema or syncope.     Data Review     EK2024: SR 65 bpm  3/24/2024: Short RP  bpm  10/2/2023: SR 67 bpm  Personally reviewed.     TTE 2024  Left ventricular size, wall motion and function are normal. The ejection  fraction is 55-60%.  Normal right ventricle size and systolic function.  No hemodynamically significant valvular abnormalities on 2D or color flow  imaging.    I have reviewed and updated the patient's past medical history, allergy list and medication list.                Physical Examination Review of Systems   BMI= Body mass index is 33.67 kg/m .    Wt Readings from Last 3 Encounters:   24 103.4 kg (228 lb)   24 101.2 kg (223 lb)   04/15/24 100.7 kg (222 lb)       Vitals: /80 (BP Location: Left  arm, Patient Position: Sitting, Cuff Size: Adult Regular)   Pulse 69   Wt 103.4 kg (228 lb)   SpO2 96%   BMI 33.67 kg/m    General   Appearance:   Alert and oriented, in no acute distress.    HEENT:  Normocephalic and atraumatic. Conjunctiva and sclera are clear. Moist oral mucosa.    Neck: No JVP, carotid bruit or obvious thyromegaly.   Lungs:   Respirations unlabored   Cardiovascular:   Rhythm is regular. S1 and S2 are normal. No significant murmur is present   Extremities: No cyanosis or clubbing   Skin: Skin is warm, dry, and otherwise intact.   Neurologic: Gait not assessed. Mood and affect appropriate.    A 12 point comprehensive review of systems was  negative except as noted.      Medical History  Surgical History Family History Social History   Past Medical History:   Diagnosis Date    Arrhythmia     Arthritis     Benign essential hypertension 12/04/2018    Diabetes (H)     Elevated triglycerides with high cholesterol 12/04/2018    Obesity (BMI 35.0-39.9) with comorbidity (H) 12/04/2018    TERESA (obstructive sleep apnea) 12/04/2018    Has BIPAP machine    Other emphysema (H) 12/04/2018    Hx following with pulmonology, [facility]    Patellofemoral disorder of both knees 12/04/2018    Prediabetes 12/04/2018    Recurrent major depressive disorder, in full remission (H24) 12/04/2018    Spinal headache     SVT (supraventricular tachycardia) (H24) 12/04/2018    Hx 2 episodes in past year, now on diltiazem for this    Past Surgical History:   Procedure Laterality Date    ARTHROPLASTY KNEE Right 11/16/2023    Procedure: RIGHT TOTAL KNEE ARTHROPLASTY;  Surgeon: Ajay Pendleton DO;  Location: [facility] Main OR    ARTHROPLASTY KNEE Left 3/11/2024    Procedure: LEFT TOTAL KNEE ARTHROPLASTY;  Surgeon: Ajay Pendleton DO;  Location: [facility] Main OR    EP ABLATION SVT N/A 4/30/2024    Procedure: Ablation Supraventricular Tachycardia;  Surgeon: Merlin Pacheco MD;  Location: [facility] CV    HEMORRHOIDECTOMY  EXTERNAL  2004    INCISION AND DRAINAGE PERIRECTAL ABSCESS      Family History   Problem Relation Age of Onset    Pancreatic Cancer Mother 62.00    Depression Mother     Cancer Father 88.00        gallbladder    Diabetes Sister     Depression Sister     No Known Problems Brother     Heart Failure Maternal Grandmother          early 70s    Cerebrovascular Disease Maternal Grandfather          in 80s    Heart Failure Paternal Grandmother          in 90s    Coronary Artery Disease Paternal Grandfather         late 60s    Heart Failure Paternal Grandfather     No Known Problems Brother     Social History     Socioeconomic History    Marital status:      Spouse name: Not on file    Number of children: Not on file    Years of education: Not on file    Highest education level: Not on file   Occupational History    Not on file   Tobacco Use    Smoking status: Some Days     Current packs/day: 0.00     Average packs/day: 1 pack/day for 40.0 years (40.0 ttl pk-yrs)     Types: Cigars, Cigarettes     Start date: 1974     Last attempt to quit: 2014     Years since quitting: 10.4    Smokeless tobacco: Never    Tobacco comments:     Rare cigar and E cig use   Vaping Use    Vaping status: Every Day   Substance and Sexual Activity    Alcohol use: Yes     Alcohol/week: 56.0 standard drinks of alcohol     Types: 56 Glasses of wine per week     Comment: 21-28 per week, will cut back prior to surgery    Drug use: No    Sexual activity: Not on file   Other Topics Concern    Not on file   Social History Narrative    Not on file     Social Determinants of Health     Financial Resource Strain: Low Risk  (10/2/2023)    Financial Resource Strain     Within the past 12 months, have you or your family members you live with been unable to get utilities (heat, electricity) when it was really needed?: No   Food Insecurity: Low Risk  (10/2/2023)    Food Insecurity     Within the past 12 months, did you worry that your food  would run out before you got money to buy more?: No     Within the past 12 months, did the food you bought just not last and you didn t have money to get more?: No   Transportation Needs: Low Risk  (10/2/2023)    Transportation Needs     Within the past 12 months, has lack of transportation kept you from medical appointments, getting your medicines, non-medical meetings or appointments, work, or from getting things that you need?: No   Physical Activity: Not on file   Stress: Not on file   Social Connections: Not on file   Interpersonal Safety: Low Risk  (4/15/2024)    Interpersonal Safety     Do you feel physically and emotionally safe where you currently live?: Yes     Within the past 12 months, have you been hit, slapped, kicked or otherwise physically hurt by someone?: No     Within the past 12 months, have you been humiliated or emotionally abused in other ways by your partner or ex-partner?: No   Housing Stability: Low Risk  (10/2/2023)    Housing Stability     Do you have housing? : Yes     Are you worried about losing your housing?: No          Medications  Allergies   Scheduled Meds:  Current Outpatient Medications   Medication Sig Dispense Refill    acetaminophen (TYLENOL) 325 MG tablet Take 2 tablets (650 mg) by mouth every 4 hours as needed for other (mild pain) 100 tablet 0    allopurinol (ZYLOPRIM) 100 MG tablet TAKE 1 TABLET BY MOUTH ONCE DAILY 90 tablet 1    atorvastatin (LIPITOR) 20 MG tablet TAKE ONE TABLET BY MOUTH EVERY DAY AT BEDTIME 90 tablet 3    blood glucose (NO BRAND SPECIFIED) lancets standard Use to test blood sugar 1 times daily or as directed. 100 each 1    blood glucose (NO BRAND SPECIFIED) test strip Use to test blood sugar 1 times daily or as directed. 100 strip 1    blood glucose (ONE TOUCH DELICA) lancing device Lancing device to be used with lancets. 1 each 0    blood glucose monitoring (ONETOUCH VERIO) meter device kit Use to test blood sugar 1 times daily or as directed. 1 kit 0     buPROPion (WELLBUTRIN XL) 150 MG 24 hr tablet TAKE ONE TABLET BY MOUTH EVERY DAY 90 tablet 0    diltiazem ER COATED BEADS (CARDIZEM CD/CARTIA XT) 180 MG 24 hr capsule TAKE ONE CAPSULE BY MOUTH EVERY DAY 90 capsule 3    fenofibrate (TRICOR) 48 MG tablet TAKE ONE TABLET BY MOUTH EVERY DAY 90 tablet 3    FLUoxetine (PROZAC) 20 MG capsule TAKE ONE CAPSULE BY MOUTH EVERY DAY 90 capsule 3    Lancets (ONETOUCH DELICA PLUS IUBJXY22K) MISC USE TO TEST 1 TIME DAILY 100 each 1    losartan (COZAAR) 50 MG tablet TAKE ONE TABLET BY MOUTH EVERY DAY 90 tablet 2    metFORMIN (GLUCOPHAGE) 500 MG tablet TAKE ONE TABLET BY MOUTH TWICE A DAY WITH MEALS 180 tablet 0    naproxen sodium (ANAPROX) 220 MG tablet Take 220 mg by mouth daily      OLANZapine (ZYPREXA) 5 MG tablet TAKE TWO TABLETS BY MOUTH EVERY  tablet 1    No Known Allergies      Lab Results    Chemistry/lipid CBC Cardiac Enzymes/BNP/TSH/INR   Lab Results   Component Value Date    CHOL 165 2023    HDL 58 2023    TRIG 120 2023    BUN 11.8 2024     2024    CO2 25 2024    Lab Results   Component Value Date    WBC 6.5 2024    HGB 12.7 (L) 2024    HCT 40.6 2024    MCV 91 2024     2024    @RESUFAST(BMP,CBC,BNP,TSH,  INR)@      20 minutes spent reviewing prior records (including documentation, laboratory studies, cardiac testing/imaging), history and physical exam, planning, and subsequent documentation.     This note has been dictated using voice recognition software. Any grammatical, typographical, or context distortions are unintentional and inherent to the software.    Cassidy Bach, CNP  Clinical Cardiac Electrophysiology  [facility]  Clinic and [phone]  Fax: [phone]  Electrophysiology Nurses: [phone]

## 2024-06-11 NOTE — LETTER
2024    Rashi Nichols MD, MD  [address]  [facility] [address]    RE: Christopher Lindsey       Dear Colleague,     I had the pleasure of seeing Christopher Lindsey in the [facility].       [facility]  Cardiac Electrophysiology  [address]  [place] [address]   Office: [phone]  Fax: [phone]     HEART CARE ELECTROPHYSIOLOGY FOLLOW UP    Primary Care: Rashi Nichols MD    Assessment/Recommendations     SVT-AVNRT: Status post slow pathway ablation 2024.  Unremarkable recovery without symptomatology or documentation of recurrent arrhythmia    HTN: Controlled    Follow up: general cardiology 6 months       History of Present Illness/Subjective    Christopher Lindsey is a 73 year old male who is seen today for follow up status post SVT ablation. He has a past medical history significant for HTN, non-insulin-dependent type 2 diabetes, untreated TERESA, alcohol use, obesity.  History of SVT dates back to , treated with diltiazem, with episodes approximately once a year since that time, requiring adenosine for termination 3/24/2024.  He underwent EPS and slow pathway AVNRT ablation 2024. His recovery has been uneventful. He underwent knee surgery in March and is slowly regaining stamina and strength.  He denies groin site issues, dyspnea, palpitations, chest discomfort, lightheadedness/dizziness, pedal edema or syncope.     Data Review     EK2024: SR 65 bpm  3/24/2024: Short RP  bpm  10/2/2023: SR 67 bpm  Personally reviewed.     TTE 2024  Left ventricular size, wall motion and function are normal. The ejection  fraction is 55-60%.  Normal right ventricle size and systolic function.  No hemodynamically significant valvular abnormalities on 2D or color flow  imaging.    I have reviewed and updated the patient's past medical history, allergy list and medication list.                Physical Examination Review of  Systems   BMI= Body mass index is 33.67 kg/m .    Wt Readings from Last 3 Encounters:   06/11/24 103.4 kg (228 lb)   04/30/24 101.2 kg (223 lb)   04/15/24 100.7 kg (222 lb)       Vitals: /80 (BP Location: Left arm, Patient Position: Sitting, Cuff Size: Adult Regular)   Pulse 69   Wt 103.4 kg (228 lb)   SpO2 96%   BMI 33.67 kg/m    General   Appearance:   Alert and oriented, in no acute distress.    HEENT:  Normocephalic and atraumatic. Conjunctiva and sclera are clear. Moist oral mucosa.    Neck: No JVP, carotid bruit or obvious thyromegaly.   Lungs:   Respirations unlabored   Cardiovascular:   Rhythm is regular. S1 and S2 are normal. No significant murmur is present   Extremities: No cyanosis or clubbing   Skin: Skin is warm, dry, and otherwise intact.   Neurologic: Gait not assessed. Mood and affect appropriate.    A 12 point comprehensive review of systems was  negative except as noted.      Medical History  Surgical History Family History Social History   Past Medical History:   Diagnosis Date    Arrhythmia     Arthritis     Benign essential hypertension 12/04/2018    Diabetes (H)     Elevated triglycerides with high cholesterol 12/04/2018    Obesity (BMI 35.0-39.9) with comorbidity (H) 12/04/2018    TERESA (obstructive sleep apnea) 12/04/2018    Has BIPAP machine    Other emphysema (H) 12/04/2018    Hx following with pulmonology, [facility]    Patellofemoral disorder of both knees 12/04/2018    Prediabetes 12/04/2018    Recurrent major depressive disorder, in full remission (H24) 12/04/2018    Spinal headache     SVT (supraventricular tachycardia) (H24) 12/04/2018    Hx 2 episodes in past year, now on diltiazem for this    Past Surgical History:   Procedure Laterality Date    ARTHROPLASTY KNEE Right 11/16/2023    Procedure: RIGHT TOTAL KNEE ARTHROPLASTY;  Surgeon: Ajay Pendleton DO;  Location: [facility] Main OR    ARTHROPLASTY KNEE Left 3/11/2024    Procedure: LEFT TOTAL KNEE ARTHROPLASTY;  Surgeon:  Ajay Pendleton DO;  Location: [facility] Main OR    EP ABLATION SVT N/A 2024    Procedure: Ablation Supraventricular Tachycardia;  Surgeon: Merlin Pacheco MD;  Location: [facility] LAB CV    HEMORRHOIDECTOMY EXTERNAL  2004    INCISION AND DRAINAGE PERIRECTAL ABSCESS      Family History   Problem Relation Age of Onset    Pancreatic Cancer Mother 62.00    Depression Mother     Cancer Father 88.00        gallbladder    Diabetes Sister     Depression Sister     No Known Problems Brother     Heart Failure Maternal Grandmother          early 70s    Cerebrovascular Disease Maternal Grandfather          in 80s    Heart Failure Paternal Grandmother          in 90s    Coronary Artery Disease Paternal Grandfather         late 60s    Heart Failure Paternal Grandfather     No Known Problems Brother     Social History     Socioeconomic History    Marital status:      Spouse name: Not on file    Number of children: Not on file    Years of education: Not on file    Highest education level: Not on file   Occupational History    Not on file   Tobacco Use    Smoking status: Some Days     Current packs/day: 0.00     Average packs/day: 1 pack/day for 40.0 years (40.0 ttl pk-yrs)     Types: Cigars, Cigarettes     Start date: 1974     Last attempt to quit: 2014     Years since quitting: 10.4    Smokeless tobacco: Never    Tobacco comments:     Rare cigar and E cig use   Vaping Use    Vaping status: Every Day   Substance and Sexual Activity    Alcohol use: Yes     Alcohol/week: 56.0 standard drinks of alcohol     Types: 56 Glasses of wine per week     Comment: 21-28 per week, will cut back prior to surgery    Drug use: No    Sexual activity: Not on file   Other Topics Concern    Not on file   Social History Narrative    Not on file     Social Determinants of Health     Financial Resource Strain: Low Risk  (10/2/2023)    Financial Resource Strain     Within the past 12 months, have you or your family  members you live with been unable to get utilities (heat, electricity) when it was really needed?: No   Food Insecurity: Low Risk  (10/2/2023)    Food Insecurity     Within the past 12 months, did you worry that your food would run out before you got money to buy more?: No     Within the past 12 months, did the food you bought just not last and you didn t have money to get more?: No   Transportation Needs: Low Risk  (10/2/2023)    Transportation Needs     Within the past 12 months, has lack of transportation kept you from medical appointments, getting your medicines, non-medical meetings or appointments, work, or from getting things that you need?: No   Physical Activity: Not on file   Stress: Not on file   Social Connections: Not on file   Interpersonal Safety: Low Risk  (4/15/2024)    Interpersonal Safety     Do you feel physically and emotionally safe where you currently live?: Yes     Within the past 12 months, have you been hit, slapped, kicked or otherwise physically hurt by someone?: No     Within the past 12 months, have you been humiliated or emotionally abused in other ways by your partner or ex-partner?: No   Housing Stability: Low Risk  (10/2/2023)    Housing Stability     Do you have housing? : Yes     Are you worried about losing your housing?: No          Medications  Allergies   Scheduled Meds:  Current Outpatient Medications   Medication Sig Dispense Refill    acetaminophen (TYLENOL) 325 MG tablet Take 2 tablets (650 mg) by mouth every 4 hours as needed for other (mild pain) 100 tablet 0    allopurinol (ZYLOPRIM) 100 MG tablet TAKE 1 TABLET BY MOUTH ONCE DAILY 90 tablet 1    atorvastatin (LIPITOR) 20 MG tablet TAKE ONE TABLET BY MOUTH EVERY DAY AT BEDTIME 90 tablet 3    blood glucose (NO BRAND SPECIFIED) lancets standard Use to test blood sugar 1 times daily or as directed. 100 each 1    blood glucose (NO BRAND SPECIFIED) test strip Use to test blood sugar 1 times daily or as directed. 100 strip 1     blood glucose (ONE TOUCH DELICA) lancing device Lancing device to be used with lancets. 1 each 0    blood glucose monitoring (ONETOUCH VERIO) meter device kit Use to test blood sugar 1 times daily or as directed. 1 kit 0    buPROPion (WELLBUTRIN XL) 150 MG 24 hr tablet TAKE ONE TABLET BY MOUTH EVERY DAY 90 tablet 0    diltiazem ER COATED BEADS (CARDIZEM CD/CARTIA XT) 180 MG 24 hr capsule TAKE ONE CAPSULE BY MOUTH EVERY DAY 90 capsule 3    fenofibrate (TRICOR) 48 MG tablet TAKE ONE TABLET BY MOUTH EVERY DAY 90 tablet 3    FLUoxetine (PROZAC) 20 MG capsule TAKE ONE CAPSULE BY MOUTH EVERY DAY 90 capsule 3    Lancets (ONETOUCH DELICA PLUS [id number]) MISC USE TO TEST 1 TIME DAILY 100 each 1    losartan (COZAAR) 50 MG tablet TAKE ONE TABLET BY MOUTH EVERY DAY 90 tablet 2    metFORMIN (GLUCOPHAGE) 500 MG tablet TAKE ONE TABLET BY MOUTH TWICE A DAY WITH MEALS 180 tablet 0    naproxen sodium (ANAPROX) 220 MG tablet Take 220 mg by mouth daily      OLANZapine (ZYPREXA) 5 MG tablet TAKE TWO TABLETS BY MOUTH EVERY  tablet 1    No Known Allergies      Lab Results    Chemistry/lipid CBC Cardiac Enzymes/BNP/TSH/INR   Lab Results   Component Value Date    CHOL 165 09/11/2023    HDL 58 09/11/2023    TRIG 120 09/11/2023    BUN 11.8 04/30/2024     04/30/2024    CO2 25 04/30/2024    Lab Results   Component Value Date    WBC 6.5 04/30/2024    HGB 12.7 (L) 04/30/2024    HCT 40.6 04/30/2024    MCV 91 04/30/2024     04/30/2024    @RESUFAST(BMP,CBC,BNP,TSH,  INR)@      20 minutes spent reviewing prior records (including documentation, laboratory studies, cardiac testing/imaging), history and physical exam, planning, and subsequent documentation.     This note has been dictated using voice recognition software. Any grammatical, typographical, or context distortions are unintentional and inherent to the software.    Cassidy Bach, CNP  Clinical Cardiac Electrophysiology  [facility] and  [phone]  Fax: [phone]  Electrophysiology Nurses: [phone]        Thank you for allowing me to participate in the care of your patient.      Sincerely,     SULLY NICHOLS [facility]  cc:   Jimena Garcia MD  [address] [id number]

## 2024-07-01 ENCOUNTER — TRANSFERRED RECORDS (OUTPATIENT)
Dept: HEALTH INFORMATION MANAGEMENT | Facility: CLINIC | Age: 74
End: 2024-07-01
Payer: MEDICARE

## 2024-07-10 DIAGNOSIS — E78.2 ELEVATED TRIGLYCERIDES WITH HIGH CHOLESTEROL: ICD-10-CM

## 2024-07-11 RX ORDER — ATORVASTATIN CALCIUM 20 MG/1
TABLET, FILM COATED ORAL
Qty: 90 TABLET | Refills: 2 | Status: SHIPPED | OUTPATIENT
Start: 2024-07-11

## 2024-07-20 ENCOUNTER — HEALTH MAINTENANCE LETTER (OUTPATIENT)
Age: 74
End: 2024-07-20

## 2024-07-28 DIAGNOSIS — F33.1 MODERATE EPISODE OF RECURRENT MAJOR DEPRESSIVE DISORDER (H): ICD-10-CM

## 2024-07-29 RX ORDER — OLANZAPINE 5 MG/1
TABLET ORAL
Qty: 180 TABLET | Refills: 2 | Status: SHIPPED | OUTPATIENT
Start: 2024-07-29

## 2024-08-12 ENCOUNTER — PATIENT OUTREACH (OUTPATIENT)
Dept: CARE COORDINATION | Facility: CLINIC | Age: 74
End: 2024-08-12
Payer: MEDICARE

## 2024-08-24 DIAGNOSIS — E11.9 TYPE 2 DIABETES MELLITUS WITHOUT COMPLICATION, WITHOUT LONG-TERM CURRENT USE OF INSULIN (H): ICD-10-CM

## 2024-08-26 ENCOUNTER — PATIENT OUTREACH (OUTPATIENT)
Dept: CARE COORDINATION | Facility: CLINIC | Age: 74
End: 2024-08-26
Payer: MEDICARE

## 2024-09-01 DIAGNOSIS — M1A.00X0 IDIOPATHIC CHRONIC GOUT WITHOUT TOPHUS, UNSPECIFIED SITE: ICD-10-CM

## 2024-09-03 RX ORDER — ALLOPURINOL 100 MG/1
TABLET ORAL
Qty: 90 TABLET | Refills: 1 | Status: SHIPPED | OUTPATIENT
Start: 2024-09-03

## 2024-09-07 DIAGNOSIS — F33.42 RECURRENT MAJOR DEPRESSIVE DISORDER, IN FULL REMISSION (H): ICD-10-CM

## 2024-09-09 RX ORDER — BUPROPION HYDROCHLORIDE 150 MG/1
TABLET ORAL
Qty: 90 TABLET | Refills: 1 | Status: SHIPPED | OUTPATIENT
Start: 2024-09-09

## 2024-10-12 DIAGNOSIS — E11.9 TYPE 2 DIABETES MELLITUS WITHOUT COMPLICATION, WITHOUT LONG-TERM CURRENT USE OF INSULIN (H): ICD-10-CM

## 2024-10-14 RX ORDER — BLOOD SUGAR DIAGNOSTIC
STRIP MISCELLANEOUS
Qty: 100 STRIP | Refills: 1 | Status: SHIPPED | OUTPATIENT
Start: 2024-10-14

## 2024-11-20 DIAGNOSIS — E11.9 TYPE 2 DIABETES MELLITUS WITHOUT COMPLICATION, WITHOUT LONG-TERM CURRENT USE OF INSULIN (H): ICD-10-CM

## 2024-11-20 RX ORDER — LANCETS 33 GAUGE
EACH MISCELLANEOUS
Qty: 100 EACH | Refills: 1 | Status: SHIPPED | OUTPATIENT
Start: 2024-11-20

## 2024-11-21 PROBLEM — K63.5 POLYP OF COLON: Status: ACTIVE | Noted: 2024-07-01

## 2024-11-21 PROBLEM — K57.30 DIVERTICULAR DISEASE OF LARGE INTESTINE: Status: ACTIVE | Noted: 2024-07-01

## 2024-11-21 PROBLEM — D12.3 BENIGN NEOPLASM OF TRANSVERSE COLON: Status: ACTIVE | Noted: 2024-07-03

## 2024-12-19 DIAGNOSIS — I10 BENIGN ESSENTIAL HYPERTENSION: ICD-10-CM

## 2024-12-19 RX ORDER — LOSARTAN POTASSIUM 50 MG/1
TABLET ORAL
Qty: 90 TABLET | Refills: 2 | Status: SHIPPED | OUTPATIENT
Start: 2024-12-19

## 2025-02-16 DIAGNOSIS — E11.9 TYPE 2 DIABETES MELLITUS WITHOUT COMPLICATION, WITHOUT LONG-TERM CURRENT USE OF INSULIN (H): ICD-10-CM

## 2025-02-16 DIAGNOSIS — E66.812 CLASS 2 SEVERE OBESITY DUE TO EXCESS CALORIES WITH SERIOUS COMORBIDITY AND BODY MASS INDEX (BMI) OF 35.0 TO 35.9 IN ADULT (H): ICD-10-CM

## 2025-02-16 DIAGNOSIS — E66.01 CLASS 2 SEVERE OBESITY DUE TO EXCESS CALORIES WITH SERIOUS COMORBIDITY AND BODY MASS INDEX (BMI) OF 35.0 TO 35.9 IN ADULT (H): ICD-10-CM

## 2025-02-17 RX ORDER — SEMAGLUTIDE 0.68 MG/ML
INJECTION, SOLUTION SUBCUTANEOUS
Qty: 3 ML | Refills: 0 | OUTPATIENT
Start: 2025-02-17

## 2025-03-10 DIAGNOSIS — M1A.00X0 IDIOPATHIC CHRONIC GOUT WITHOUT TOPHUS, UNSPECIFIED SITE: ICD-10-CM

## 2025-03-10 RX ORDER — ALLOPURINOL 100 MG/1
TABLET ORAL
Qty: 90 TABLET | Refills: 1 | Status: SHIPPED | OUTPATIENT
Start: 2025-03-10

## 2025-03-12 DIAGNOSIS — F33.42 RECURRENT MAJOR DEPRESSIVE DISORDER, IN FULL REMISSION: ICD-10-CM

## 2025-03-12 RX ORDER — BUPROPION HYDROCHLORIDE 150 MG/1
TABLET ORAL
Qty: 90 TABLET | Refills: 1 | Status: SHIPPED | OUTPATIENT
Start: 2025-03-12

## 2025-03-15 ENCOUNTER — HEALTH MAINTENANCE LETTER (OUTPATIENT)
Age: 75
End: 2025-03-15

## 2025-04-06 DIAGNOSIS — I10 BENIGN ESSENTIAL HYPERTENSION: ICD-10-CM

## 2025-04-06 DIAGNOSIS — E78.2 ELEVATED TRIGLYCERIDES WITH HIGH CHOLESTEROL: ICD-10-CM

## 2025-04-06 DIAGNOSIS — I47.10 SVT (SUPRAVENTRICULAR TACHYCARDIA): ICD-10-CM

## 2025-04-07 RX ORDER — ATORVASTATIN CALCIUM 20 MG/1
TABLET, FILM COATED ORAL
Qty: 90 TABLET | Refills: 1 | Status: SHIPPED | OUTPATIENT
Start: 2025-04-07

## 2025-04-07 RX ORDER — DILTIAZEM HYDROCHLORIDE 180 MG/1
CAPSULE, COATED, EXTENDED RELEASE ORAL
Qty: 90 CAPSULE | Refills: 1 | Status: SHIPPED | OUTPATIENT
Start: 2025-04-07

## 2025-04-13 DIAGNOSIS — E78.2 ELEVATED TRIGLYCERIDES WITH HIGH CHOLESTEROL: ICD-10-CM

## 2025-04-13 DIAGNOSIS — F33.1 MODERATE EPISODE OF RECURRENT MAJOR DEPRESSIVE DISORDER (H): ICD-10-CM

## 2025-04-14 RX ORDER — OLANZAPINE 5 MG/1
10 TABLET, FILM COATED ORAL
Qty: 180 TABLET | Refills: 1 | Status: SHIPPED | OUTPATIENT
Start: 2025-04-14

## 2025-04-14 RX ORDER — FENOFIBRATE 48 MG/1
48 TABLET, FILM COATED ORAL DAILY
Qty: 90 TABLET | Refills: 1 | Status: SHIPPED | OUTPATIENT
Start: 2025-04-14

## 2025-05-05 DIAGNOSIS — E11.9 TYPE 2 DIABETES MELLITUS WITHOUT COMPLICATION, WITHOUT LONG-TERM CURRENT USE OF INSULIN (H): ICD-10-CM

## 2025-05-05 RX ORDER — BLOOD SUGAR DIAGNOSTIC
STRIP MISCELLANEOUS
Qty: 100 STRIP | Refills: 1 | Status: SHIPPED | OUTPATIENT
Start: 2025-05-05

## 2025-05-12 DIAGNOSIS — E11.9 TYPE 2 DIABETES MELLITUS WITHOUT COMPLICATION, WITHOUT LONG-TERM CURRENT USE OF INSULIN (H): ICD-10-CM

## 2025-06-04 DIAGNOSIS — F33.42 RECURRENT MAJOR DEPRESSIVE DISORDER, IN FULL REMISSION: ICD-10-CM

## 2025-06-04 DIAGNOSIS — E11.9 TYPE 2 DIABETES MELLITUS WITHOUT COMPLICATION, WITHOUT LONG-TERM CURRENT USE OF INSULIN (H): ICD-10-CM

## 2025-06-04 RX ORDER — LANCETS 33 GAUGE
EACH MISCELLANEOUS
Qty: 100 EACH | Refills: 1 | Status: SHIPPED | OUTPATIENT
Start: 2025-06-04

## 2025-06-08 DIAGNOSIS — E66.01 CLASS 2 SEVERE OBESITY DUE TO EXCESS CALORIES WITH SERIOUS COMORBIDITY AND BODY MASS INDEX (BMI) OF 35.0 TO 35.9 IN ADULT (H): ICD-10-CM

## 2025-06-08 DIAGNOSIS — E11.9 TYPE 2 DIABETES MELLITUS WITHOUT COMPLICATION, WITHOUT LONG-TERM CURRENT USE OF INSULIN (H): ICD-10-CM

## 2025-06-08 DIAGNOSIS — E66.812 CLASS 2 SEVERE OBESITY DUE TO EXCESS CALORIES WITH SERIOUS COMORBIDITY AND BODY MASS INDEX (BMI) OF 35.0 TO 35.9 IN ADULT (H): ICD-10-CM

## 2025-06-09 RX ORDER — SEMAGLUTIDE 1.34 MG/ML
INJECTION, SOLUTION SUBCUTANEOUS
Qty: 3 ML | Refills: 3 | Status: SHIPPED | OUTPATIENT
Start: 2025-06-09

## 2025-06-28 ENCOUNTER — HEALTH MAINTENANCE LETTER (OUTPATIENT)
Age: 75
End: 2025-06-28

## 2025-08-04 ENCOUNTER — OFFICE VISIT (OUTPATIENT)
Dept: FAMILY MEDICINE | Facility: CLINIC | Age: 75
End: 2025-08-04
Payer: MEDICARE

## 2025-08-04 VITALS
BODY MASS INDEX: 33 KG/M2 | OXYGEN SATURATION: 96 % | HEIGHT: 69 IN | TEMPERATURE: 98.2 F | DIASTOLIC BLOOD PRESSURE: 76 MMHG | SYSTOLIC BLOOD PRESSURE: 124 MMHG | WEIGHT: 222.8 LBS | HEART RATE: 68 BPM | RESPIRATION RATE: 24 BRPM

## 2025-08-04 DIAGNOSIS — E66.812 CLASS 2 SEVERE OBESITY DUE TO EXCESS CALORIES WITH SERIOUS COMORBIDITY AND BODY MASS INDEX (BMI) OF 35.0 TO 35.9 IN ADULT (H): ICD-10-CM

## 2025-08-04 DIAGNOSIS — M1A.00X0 IDIOPATHIC CHRONIC GOUT WITHOUT TOPHUS, UNSPECIFIED SITE: ICD-10-CM

## 2025-08-04 DIAGNOSIS — F33.42 RECURRENT MAJOR DEPRESSIVE DISORDER, IN FULL REMISSION: ICD-10-CM

## 2025-08-04 DIAGNOSIS — I10 BENIGN ESSENTIAL HYPERTENSION: ICD-10-CM

## 2025-08-04 DIAGNOSIS — Z87.891 PERSONAL HISTORY OF TOBACCO USE, PRESENTING HAZARDS TO HEALTH: ICD-10-CM

## 2025-08-04 DIAGNOSIS — E66.01 CLASS 2 SEVERE OBESITY DUE TO EXCESS CALORIES WITH SERIOUS COMORBIDITY AND BODY MASS INDEX (BMI) OF 35.0 TO 35.9 IN ADULT (H): ICD-10-CM

## 2025-08-04 DIAGNOSIS — F10.10 ALCOHOL ABUSE: ICD-10-CM

## 2025-08-04 DIAGNOSIS — E11.9 TYPE 2 DIABETES MELLITUS WITHOUT COMPLICATION, WITHOUT LONG-TERM CURRENT USE OF INSULIN (H): Primary | ICD-10-CM

## 2025-08-04 LAB
ALBUMIN SERPL BCG-MCNC: 4.5 G/DL (ref 3.5–5.2)
ALP SERPL-CCNC: 73 U/L (ref 40–150)
ALT SERPL W P-5'-P-CCNC: 40 U/L (ref 0–70)
ANION GAP SERPL CALCULATED.3IONS-SCNC: 10 MMOL/L (ref 7–15)
AST SERPL W P-5'-P-CCNC: 36 U/L (ref 0–45)
BILIRUB SERPL-MCNC: 0.3 MG/DL
BUN SERPL-MCNC: 12.5 MG/DL (ref 8–23)
CALCIUM SERPL-MCNC: 9.9 MG/DL (ref 8.8–10.4)
CHLORIDE SERPL-SCNC: 104 MMOL/L (ref 98–107)
CREAT SERPL-MCNC: 0.78 MG/DL (ref 0.67–1.17)
EGFRCR SERPLBLD CKD-EPI 2021: >90 ML/MIN/1.73M2
EST. AVERAGE GLUCOSE BLD GHB EST-MCNC: 108 MG/DL
GLUCOSE SERPL-MCNC: 114 MG/DL (ref 70–99)
HBA1C MFR BLD: 5.4 % (ref 0–5.6)
HCO3 SERPL-SCNC: 26 MMOL/L (ref 22–29)
POTASSIUM SERPL-SCNC: 4.2 MMOL/L (ref 3.4–5.3)
PROT SERPL-MCNC: 7.3 G/DL (ref 6.4–8.3)
SODIUM SERPL-SCNC: 140 MMOL/L (ref 135–145)
URATE SERPL-MCNC: 4.6 MG/DL (ref 3.4–7)

## 2025-08-04 PROCEDURE — 3074F SYST BP LT 130 MM HG: CPT | Performed by: FAMILY MEDICINE

## 2025-08-04 PROCEDURE — 1126F AMNT PAIN NOTED NONE PRSNT: CPT | Performed by: FAMILY MEDICINE

## 2025-08-04 PROCEDURE — 3078F DIAST BP <80 MM HG: CPT | Performed by: FAMILY MEDICINE

## 2025-08-04 PROCEDURE — 3044F HG A1C LEVEL LT 7.0%: CPT | Performed by: FAMILY MEDICINE

## 2025-08-04 PROCEDURE — 99214 OFFICE O/P EST MOD 30 MIN: CPT | Performed by: FAMILY MEDICINE

## 2025-08-04 PROCEDURE — 80053 COMPREHEN METABOLIC PANEL: CPT | Performed by: FAMILY MEDICINE

## 2025-08-04 PROCEDURE — 36415 COLL VENOUS BLD VENIPUNCTURE: CPT | Performed by: FAMILY MEDICINE

## 2025-08-04 PROCEDURE — 83036 HEMOGLOBIN GLYCOSYLATED A1C: CPT | Performed by: FAMILY MEDICINE

## 2025-08-04 PROCEDURE — 84550 ASSAY OF BLOOD/URIC ACID: CPT | Performed by: FAMILY MEDICINE

## 2025-08-04 ASSESSMENT — PATIENT HEALTH QUESTIONNAIRE - PHQ9
SUM OF ALL RESPONSES TO PHQ QUESTIONS 1-9: 0
SUM OF ALL RESPONSES TO PHQ QUESTIONS 1-9: 0
10. IF YOU CHECKED OFF ANY PROBLEMS, HOW DIFFICULT HAVE THESE PROBLEMS MADE IT FOR YOU TO DO YOUR WORK, TAKE CARE OF THINGS AT HOME, OR GET ALONG WITH OTHER PEOPLE: NOT DIFFICULT AT ALL

## 2025-08-04 ASSESSMENT — PAIN SCALES - GENERAL: PAINLEVEL_OUTOF10: NO PAIN (0)

## 2025-08-05 ENCOUNTER — PATIENT OUTREACH (OUTPATIENT)
Dept: CARE COORDINATION | Facility: CLINIC | Age: 75
End: 2025-08-05
Payer: MEDICARE

## 2025-09-04 DIAGNOSIS — M1A.00X0 IDIOPATHIC CHRONIC GOUT WITHOUT TOPHUS, UNSPECIFIED SITE: ICD-10-CM

## 2025-09-04 DIAGNOSIS — F33.42 RECURRENT MAJOR DEPRESSIVE DISORDER, IN FULL REMISSION: ICD-10-CM

## 2025-09-04 RX ORDER — BUPROPION HYDROCHLORIDE 150 MG/1
150 TABLET ORAL DAILY
Qty: 90 TABLET | Refills: 2 | Status: SHIPPED | OUTPATIENT
Start: 2025-09-04

## 2025-09-04 RX ORDER — ALLOPURINOL 100 MG/1
100 TABLET ORAL DAILY
Qty: 90 TABLET | Refills: 0 | Status: SHIPPED | OUTPATIENT
Start: 2025-09-04

## (undated) DEVICE — GLOVE BIOGEL PI INDICATOR 8.0 LF 41680

## (undated) DEVICE — SOL WATER IRRIG 1000ML BOTTLE 2F7114

## (undated) DEVICE — BLADE SAW SAGITTAL STRK WIDE 25.4X85X1.2MM 2108-151-000

## (undated) DEVICE — SOL NACL 0.9% IRRIG 1000ML BOTTLE 2F7124

## (undated) DEVICE — CUSTOM PACK TOTAL KNEE SOP5BTKHEC

## (undated) DEVICE — SU DERMABOND ADVANCED .7ML DNX12

## (undated) DEVICE — SHEATH AGILIS 8.5FR X 71CM 408310

## (undated) DEVICE — SU STRATAFIX PDS PLUS 2-0 SPIRAL CT-1 30CM SXPP1B410

## (undated) DEVICE — GLOVE UNDER INDICATOR PI SZ 8.5 LF 41685

## (undated) DEVICE — SU STRATAFIX PDS PLUS 1 CT-1 18" SXPP1A404

## (undated) DEVICE — SUTURE MONOCRYL 3-0 18 PS2 UND MCP497G

## (undated) DEVICE — TRANSDUCER TRAY ARTERIAL 42646-06

## (undated) DEVICE — SUTURE VICRYL+ 0 27IN CT-1 UND VCP260H

## (undated) DEVICE — CAST PADDING 6IN COTTON WEBRIL STERILE 2554

## (undated) DEVICE — SUCTION MANIFOLD NEPTUNE 2 SYS 4 PORT 0702-020-000

## (undated) DEVICE — CATH, QUADRAPOLAR DEFLECTABLE EP 5MM SPACING REPROCESSED

## (undated) DEVICE — HOLDER LIMB VELCRO OR 0814-1533

## (undated) DEVICE — A3 SUPPLIES- SEE NURSING INFO PAGE

## (undated) DEVICE — CUSTOM PACK TOTAL KNEE ACCESSORY SOP5BTAHEA

## (undated) DEVICE — BLADE SAW SAGITTAL STRK DUAL CUT 4118-135-090

## (undated) DEVICE — INTRO TERUMO 7FRX25CM W/MARKER RSB703

## (undated) DEVICE — GUIDEWIRE JTIP 3MM .035 180CM IQ35F180J3

## (undated) DEVICE — INTRO SHEATH TERUMO 10FRX25CM PINNACLE RSS006

## (undated) DEVICE — INTRO MICRO MINI STICK 4FR STIFF NITINOL 45-753

## (undated) DEVICE — GLOVE BIOGEL PI SZ 8.0 40880

## (undated) DEVICE — CATH EP WOVEN PENTAPOLAR 6FR X 120CM

## (undated) DEVICE — CATH DECAPOLAR INQUIRY LG 110CM 81104

## (undated) DEVICE — SOL NACL 0.9% INJ 1000ML BAG 2B1324X

## (undated) DEVICE — GLOVE BIOGEL PI SZ 8.5 40885

## (undated) DEVICE — CUSTOM PACK EP

## (undated) DEVICE — SUTURE VICRYL+ 1 27IN CT-1 UND VCP261H

## (undated) DEVICE — TUBING KIT COOL POINT

## (undated) DEVICE — Device

## (undated) DEVICE — PLATE GROUNDING ADULT W/CORD 9165L

## (undated) DEVICE — ELECTRODE DEFIB CADENCE 22550R

## (undated) DEVICE — INTRO SHEATH 8FRX10CM PINNACLE RSS802

## (undated) DEVICE — CATH ABLTN TACTIFLEX 2-2-2 MM SPACE D-F CRV L115 A-TFSE-DF

## (undated) DEVICE — CAST PADDING 6" STERILE 9046S

## (undated) DEVICE — DECANTER VIAL 2006S

## (undated) DEVICE — BLADE RECIPRO DBL SIDED 277-96-275

## (undated) DEVICE — DRSG AQUACEL AG HYDROFIBER  3.5X10" 422605

## (undated) DEVICE — ELECTRODE PATIENT RETURN ADULT L10 FT 2 PLATE CORD 0855C

## (undated) DEVICE — KIT ENSITE SURFACE ELECTRODE ENSITE-SEK-5-01

## (undated) RX ORDER — FENTANYL CITRATE 50 UG/ML
INJECTION, SOLUTION INTRAMUSCULAR; INTRAVENOUS
Status: DISPENSED
Start: 2023-11-16

## (undated) RX ORDER — DEXAMETHASONE SODIUM PHOSPHATE 4 MG/ML
INJECTION, SOLUTION INTRA-ARTICULAR; INTRALESIONAL; INTRAMUSCULAR; INTRAVENOUS; SOFT TISSUE
Status: DISPENSED
Start: 2023-11-16

## (undated) RX ORDER — FENTANYL CITRATE-0.9 % NACL/PF 10 MCG/ML
PLASTIC BAG, INJECTION (ML) INTRAVENOUS
Status: DISPENSED
Start: 2023-11-16

## (undated) RX ORDER — LIDOCAINE HYDROCHLORIDE 10 MG/ML
INJECTION, SOLUTION EPIDURAL; INFILTRATION; INTRACAUDAL; PERINEURAL
Status: DISPENSED
Start: 2023-11-16

## (undated) RX ORDER — PROPOFOL 10 MG/ML
INJECTION, EMULSION INTRAVENOUS
Status: DISPENSED
Start: 2024-04-30

## (undated) RX ORDER — NITROGLYCERIN 5 MG/ML
VIAL (ML) INTRAVENOUS
Status: DISPENSED
Start: 2024-04-30

## (undated) RX ORDER — PROPOFOL 10 MG/ML
INJECTION, EMULSION INTRAVENOUS
Status: DISPENSED
Start: 2023-11-16

## (undated) RX ORDER — ONDANSETRON 2 MG/ML
INJECTION INTRAMUSCULAR; INTRAVENOUS
Status: DISPENSED
Start: 2024-03-11

## (undated) RX ORDER — GLYCOPYRROLATE 0.2 MG/ML
INJECTION, SOLUTION INTRAMUSCULAR; INTRAVENOUS
Status: DISPENSED
Start: 2023-11-16

## (undated) RX ORDER — LIDOCAINE HYDROCHLORIDE AND EPINEPHRINE 10; 10 MG/ML; UG/ML
INJECTION, SOLUTION INFILTRATION; PERINEURAL
Status: DISPENSED
Start: 2024-04-30

## (undated) RX ORDER — DEXAMETHASONE SODIUM PHOSPHATE 10 MG/ML
INJECTION, EMULSION INTRAMUSCULAR; INTRAVENOUS
Status: DISPENSED
Start: 2023-11-16

## (undated) RX ORDER — ONDANSETRON 2 MG/ML
INJECTION INTRAMUSCULAR; INTRAVENOUS
Status: DISPENSED
Start: 2023-11-16

## (undated) RX ORDER — PROPOFOL 10 MG/ML
INJECTION, EMULSION INTRAVENOUS
Status: DISPENSED
Start: 2024-03-11

## (undated) RX ORDER — FENTANYL CITRATE 50 UG/ML
INJECTION, SOLUTION INTRAMUSCULAR; INTRAVENOUS
Status: DISPENSED
Start: 2024-04-30

## (undated) RX ORDER — PHENYLEPHRINE HYDROCHLORIDE 10 MG/ML
INJECTION INTRAVENOUS
Status: DISPENSED
Start: 2024-03-11

## (undated) RX ORDER — HEPARIN SODIUM 10000 [USP'U]/100ML
INJECTION, SOLUTION INTRAVENOUS
Status: DISPENSED
Start: 2024-04-30